# Patient Record
Sex: FEMALE | Race: OTHER | Employment: OTHER | URBAN - METROPOLITAN AREA
[De-identification: names, ages, dates, MRNs, and addresses within clinical notes are randomized per-mention and may not be internally consistent; named-entity substitution may affect disease eponyms.]

---

## 2017-01-27 ENCOUNTER — CLINICAL SUPPORT (OUTPATIENT)
Dept: CARDIOLOGY CLINIC | Age: 57
End: 2017-01-27

## 2017-01-27 DIAGNOSIS — I48.91 ATRIAL FIBRILLATION, UNSPECIFIED TYPE (HCC): Primary | ICD-10-CM

## 2017-01-27 DIAGNOSIS — Z79.01 LONG TERM (CURRENT) USE OF ANTICOAGULANTS: ICD-10-CM

## 2017-01-30 ENCOUNTER — TELEPHONE (OUTPATIENT)
Dept: CARDIOLOGY CLINIC | Age: 57
End: 2017-01-30

## 2017-02-24 ENCOUNTER — CLINICAL SUPPORT (OUTPATIENT)
Dept: CARDIOLOGY CLINIC | Age: 57
End: 2017-02-24

## 2017-02-24 DIAGNOSIS — Z79.01 LONG TERM (CURRENT) USE OF ANTICOAGULANTS: ICD-10-CM

## 2017-02-24 DIAGNOSIS — I48.91 ATRIAL FIBRILLATION, UNSPECIFIED TYPE (HCC): Primary | ICD-10-CM

## 2017-02-24 LAB
INR BLD: NORMAL
INR, EXTERNAL: 2 (ref 2–3)
PT POC: NORMAL SEC
VALID INTERNAL CONTROL?: YES

## 2017-03-24 ENCOUNTER — CLINICAL SUPPORT (OUTPATIENT)
Dept: CARDIOLOGY CLINIC | Age: 57
End: 2017-03-24

## 2017-03-24 DIAGNOSIS — Z79.01 LONG TERM (CURRENT) USE OF ANTICOAGULANTS: ICD-10-CM

## 2017-03-24 DIAGNOSIS — I48.0 PAROXYSMAL ATRIAL FIBRILLATION (HCC): Primary | ICD-10-CM

## 2017-03-24 LAB
INR BLD: NORMAL
PT POC: NORMAL SEC
VALID INTERNAL CONTROL?: YES

## 2017-03-28 ENCOUNTER — TELEPHONE (OUTPATIENT)
Dept: CARDIOLOGY CLINIC | Age: 57
End: 2017-03-28

## 2017-03-28 NOTE — TELEPHONE ENCOUNTER
Please call Ms. Yane Castillo at [phone] or daughter, Vandana Alexandra at [phone]. She saw her allergy dr and was advised that she has enlarged heart. She's due to being flying out of the country on 4/10/17. She needs to know if it's ok for her to fly.      Thank you, Aga Gallegos

## 2017-03-28 NOTE — TELEPHONE ENCOUNTER
Returned call to TXU Areli, unable to reach patient. Patient needs to have an echo due to CXR showed cardiomegaly.  Appointment scheduled:    Future Appointments  Date Time Provider Pablito Shah   3/29/2017 3:00 PM ECHOTWO, [address]   4/24/2017 9:40 AM COUM[facility]   6/26/2017 3:00 PM Robb Campbell  [address]     2 pt identifiers used

## 2017-03-29 ENCOUNTER — CLINICAL SUPPORT (OUTPATIENT)
Dept: CARDIOLOGY CLINIC | Age: 57
End: 2017-03-29

## 2017-03-29 DIAGNOSIS — I48.0 PAROXYSMAL ATRIAL FIBRILLATION (HCC): ICD-10-CM

## 2017-03-29 DIAGNOSIS — I51.7 CARDIOMEGALY: Primary | ICD-10-CM

## 2017-04-04 ENCOUNTER — TELEPHONE (OUTPATIENT)
Dept: CARDIOLOGY CLINIC | Age: 57
End: 2017-04-04

## 2017-04-04 NOTE — TELEPHONE ENCOUNTER
Pt calling to see if Dr. Anika Castro need to see her before she leave town. She can be reached at [phone].  [employer]

## 2017-04-04 NOTE — TELEPHONE ENCOUNTER
Patient identified with 2 identifiers  Daughter called back, she would like to know if her mother can leave town based on the results of her echo done on 3-29-17. Please advise, thank you.

## 2017-04-05 NOTE — TELEPHONE ENCOUNTER
I spoke to daughter Denilson Artis, 2 pt identifiers used  Per Dr. Edelmira Nichols Echo is WNL, EF 55-60% and patient is okay to go out of town.  She will follow up as scheduled:    Future Appointments  Date Time Provider Pablito Shah   4/24/2017 9:40 AM COUMADIN, [address]   6/26/2017 3:00 PM Francisco Paige  [address]

## 2017-04-27 ENCOUNTER — CLINICAL SUPPORT (OUTPATIENT)
Dept: CARDIOLOGY CLINIC | Age: 57
End: 2017-04-27

## 2017-04-27 DIAGNOSIS — I48.0 PAROXYSMAL ATRIAL FIBRILLATION (HCC): ICD-10-CM

## 2017-04-27 DIAGNOSIS — Z79.01 LONG TERM (CURRENT) USE OF ANTICOAGULANTS: Primary | ICD-10-CM

## 2017-04-27 LAB — INR, EXTERNAL: 2.1 (ref 2–3)

## 2017-05-04 LAB
INR BLD: NORMAL
PT POC: NORMAL SEC
VALID INTERNAL CONTROL?: YES

## 2017-05-04 NOTE — PROGRESS NOTES

## 2017-05-26 ENCOUNTER — CLINICAL SUPPORT (OUTPATIENT)
Dept: CARDIOLOGY CLINIC | Age: 57
End: 2017-05-26

## 2017-05-26 DIAGNOSIS — Z79.01 LONG TERM (CURRENT) USE OF ANTICOAGULANTS: ICD-10-CM

## 2017-05-26 DIAGNOSIS — I48.0 PAROXYSMAL ATRIAL FIBRILLATION (HCC): Primary | ICD-10-CM

## 2017-05-26 LAB
INR BLD: NORMAL
INR, EXTERNAL: 1.9 (ref 2–3)
PT POC: NORMAL SECONDS
VALID INTERNAL CONTROL?: YES

## 2017-06-26 ENCOUNTER — OFFICE VISIT (OUTPATIENT)
Dept: CARDIOLOGY CLINIC | Age: 57
End: 2017-06-26

## 2017-06-26 ENCOUNTER — CLINICAL SUPPORT (OUTPATIENT)
Dept: CARDIOLOGY CLINIC | Age: 57
End: 2017-06-26

## 2017-06-26 VITALS
WEIGHT: 181.6 LBS | RESPIRATION RATE: 16 BRPM | SYSTOLIC BLOOD PRESSURE: 124 MMHG | BODY MASS INDEX: 32.18 KG/M2 | HEART RATE: 60 BPM | DIASTOLIC BLOOD PRESSURE: 82 MMHG | HEIGHT: 63 IN

## 2017-06-26 DIAGNOSIS — I48.0 PAROXYSMAL ATRIAL FIBRILLATION (HCC): Primary | ICD-10-CM

## 2017-06-26 DIAGNOSIS — E78.5 DYSLIPIDEMIA: Chronic | ICD-10-CM

## 2017-06-26 DIAGNOSIS — I25.10 CORONARY ARTERY DISEASE INVOLVING NATIVE HEART WITHOUT ANGINA PECTORIS, UNSPECIFIED VESSEL OR LESION TYPE: ICD-10-CM

## 2017-06-26 DIAGNOSIS — Z79.01 LONG TERM (CURRENT) USE OF ANTICOAGULANTS: ICD-10-CM

## 2017-06-26 DIAGNOSIS — Z71.89 ADVANCED CARE PLANNING/COUNSELING DISCUSSION: ICD-10-CM

## 2017-06-26 DIAGNOSIS — I10 ESSENTIAL HYPERTENSION: Chronic | ICD-10-CM

## 2017-06-26 LAB
INR BLD: NORMAL
INR, EXTERNAL: 2.6 (ref 2–3)
PT POC: NORMAL SECONDS
VALID INTERNAL CONTROL?: YES

## 2017-06-26 NOTE — MR AVS SNAPSHOT
Visit Information Kayode Szymanski y Shreya Personal Médico Departamento Teléfono del Dep. Número de visita 6/26/2017  3:00 PM Vijaya Jacome MD [facility] [phone] Your Appointments 8/3/2017  3:00 PM  
COUMADIN CLINIC with HERBERTH JOHNSON [facility] (CARLITA SCHEDULING) Appt Note: 1 month  
 [address] [facility] [address]  
[address]  
  
    
 12/18/2017  2:40 PM  
ESTABLISHED PATIENT with Vijaya Jacome MD  
[facility] ([facility]) Appt Note: 6 month follow up  
 [address]  
[address] [address] Upcoming Health Maintenance Date Due Hepatitis C Screening 1960 DTaP/Tdap/Td series (1 - Tdap) 1/30/1980 PAP AKA CERVICAL CYTOLOGY 1/30/1980 BREAST CANCER SCRN MAMMOGRAM 1/30/2009 FOBT Q 1 YEAR AGE 50-75 1/30/2009 INFLUENZA AGE 9 TO ADULT 8/1/2017 Alergias  Review Complete El: 6/26/2017 Por: Anyi Leo A partir del:  6/26/2017 Intensidad Anotado Tipo de reacción [facility] Financial Bactrim [Sulfamethoprim Ds]  12/15/2015    Rash, Nausea and Vomiting Other Medication  12/15/2015    Rash SURGICAL GLUE Oxycodone  12/15/2015    Nausea and Vomiting Vacunas actuales [id number] el:  12/26/2015 No hay ninguna vacuna archivada. No revisadas esta visita You Were Diagnosed With   
  
 Michelle Chery Paroxysmal atrial fibrillation (HCC)    -  Primary ICD-10-CM: I48.0 ICD-9-CM: 427.31 Dyslipidemia     ICD-10-CM: E78.5 ICD-9-CM: 272.4 Essential hypertension     ICD-10-CM: I10 
ICD-9-CM: 401.9 Advanced care planning/counseling discussion     ICD-10-CM: Z71.89 ICD-9-CM: V65.49 Partes vitales PS Pulso Resp Morgan ( percentil de crecimiento) Peso (percentil de crecimiento) BMI (IMC)  
 124/82 (BP 1 Location: Left arm, BP Patient Position: Sitting) 60 16 5' 3\" (1.6 m) 181 lb 9.6 oz (82.4 kg) 32.17 kg/m2 Estado obstétrico Estatus de tabaquísmo Menopause Former Smoker Historial de signos vitales BMI and BSA Data Body Mass Index Body Surface Area  
 32.17 kg/m 2 1.91 m 2 [facility] Name Phone [facility] [address] [phone] Gómez lista de medicamentos actualizada Lista actualizada el: 6/26/17  4:02 PM.  Ani Pries use gómez lista de medicamentos más reciente. acetaminophen 500 mg tablet También conocido manuelito:  TYLENOL Take 1 Tab by mouth every four (4) hours (while awake). ADVAIR DISKUS 500-50 mcg/dose diskus inhaler Medicamento genérico:  fluticasone-salmeterol Take 1 Inhalation by inhalation daily. albuterol 90 mcg/actuation inhaler También conocido manuelito:  PROVENTIL HFA, VENTOLIN HFA, PROAIR HFA Take 2 Puffs by inhalation every four (4) hours as needed. aspirin delayed-release 81 mg tablet Take  by mouth daily. atenolol 50 mg tablet También conocido manuelito:  TENORMIN  
TAKE ONE TABLET BY MOUTH DAILY  
  
 atorvastatin 80 mg tablet También conocido manuelito:  LIPITOR Take 80 mg by mouth nightly. fluticasone 50 mcg/actuation nasal spray También conocido manuelito:  FLONASE 2 Sprays by Both Nostrils route two (2) times a day. fosinopril 10 mg tablet También conocido manuelito:  MONOPRIL Take 10 mg by mouth daily. glipiZIDE 5 mg tablet También conocido manuelito:  Arabi Pears Take  by mouth daily (with lunch). minocycline 100 mg capsule También conocido manuelito:  Climmie Score Take  by mouth daily as needed. NovoLOG Mix 70-30 FlexPen 100 unit/mL (70-30) Inpn Medicamento genérico:  insulin aspart protamine/insulin aspart  
by SubCUTAneous route three (3) times daily. PT TAKES ON SS, BUT SHE CANNOT TELL WHAT THE SCALE IS-STATES IT'S BASED ON WHAT SHE IS GOING TO EAT. PATANOL 0.1 % ophthalmic solution Medicamento genérico:  olopatadine Administer 2 Drops to both eyes two (2) times a day. PROTONIX 40 mg tablet Medicamento genérico:  pantoprazole Take 40 mg by mouth daily. RESTASIS 0.05 % ophthalmic emulsion Medicamento genérico:  cycloSPORINE Administer 1 Drop to both eyes as needed. senna-docusate 8.6-50 mg per tablet También conocido manuelito:  Agnesshea Friend Take 1 Tab by mouth two (2) times a day. sucralfate 1 gram tablet También conocido manuelito:  Abeba Gurrola Take 1 g by mouth four (4) times daily. SYSTANE GEL 0.4-0.3 % Drpg Medicamento genérico:  peg 400-propylene glycol Apply  to eye two (2) times a day. traMADol 50 mg tablet También conocido manuelito:  ULTRAM  
Take 1 Tab by mouth every six (6) hours as needed for Pain. Max Daily Amount: 200 mg.  
  
 warfarin 2 mg tablet También conocido manuelito:  COUMADIN Take 2 Tabs by mouth daily. Hicimos lo siguiente AMB POC EKG ROUTINE W/ 12 LEADS, INTER & REP [68705 CPT(R)] Introducing [facility]! [facility] introduce portal paciente MyChart . Ahora se puede acceder a partes de gómez expediente médico, enviar por correo electrónico la oficina de gómez médico y solicitar renovaciones de medicamentos en línea. En gómez navegador de Internet , Dawood Huffman a [email or web address] Lucia clic en el usuario por Humberto Mujica? Verlean Dover clic aquí en la sesión [place]. Verá la página de registro [place]. Ingrese gómez código de [employer] ben y manuelito aparece a continuación. Usted no tendrá que [employer] código después de ting completado el proceso de registro .  Si usted no se inscribe antes de la fecha de caducidad , debe solicitar un nuevo código. · MyChart Código de acceso : [id number] Expires: 6/29/2017  1:07 PM 
 
Ingresa los últimos cuatro dígitos de gómez Número de Seguro Social ( xxxx ) y fecha de nacimiento ( dd / mm / aaaa ) manuelito se indica y lucia clic en Enviar. Usted será llevado a la siguiente página de registro . Crear un ID MyChart . Esta será gómez ID de inicio de sesión de MyChart y no puede ser [place] , por lo que pensar en angelika que es [place] y fácil de recordar . Crear angelika contraseña MyChart . Usted puede cambiar gómez contraseña en cualquier momento . Ingrese gómez Password Reset de preguntas y [place] . [place] se puede utilizar en un momento posterior si usted olvida gómez contraseña. Introduzca gómez dirección de correo electrónico . Nely Howard recibirá angelika notificación por correo electrónico cuando la nueva información está disponible en MyChart . Michelletete Rogers clic en Registrarse. Kristan Gram juanito y descargar porciones de gómez expediente médico. 
Lucia clic en el enlace de descarga del menú Resumen para descargar angelika copia portátil de gómez información médica . Si tiene [employer] , por favor visite la sección de preguntas frecuentes del sitio web MyChart . Recuerde, MyChart NO es que se utilizará para las necesidades urgentes. Para emergencias médicas , llame al 911 . Ahora disponible en gómez iPhone y Android ! Por favor proporcione sada resumen de la documentación de cuidado a gómez próximo proveedor. Your primary care clinician is listed as Shira Correa. If you have any questions after today's visit, please call [phone].

## 2017-06-26 NOTE — ACP (ADVANCE CARE PLANNING)
Discussed honoring choices with her today. She does not have an advanced directive or medical power of  and indicates that she would be interested in completing them. She says she would designate her daughter as her healthcare agent although she is technically still  to her  but they have been  for many years. She was given a honoring choices folder and I advised her that our [facility]. facilitator Rakesh Thompson would be in touch with her. She indicated that she may want to wait to complete her facilitation at her next visit to cut down on trips to office but will ask Della Bañuelos to revisit this with her.
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
## 2017-06-27 ENCOUNTER — PATIENT OUTREACH (OUTPATIENT)
Dept: CARDIOLOGY CLINIC | Age: 57
End: 2017-06-27

## 2017-06-27 NOTE — ACP (ADVANCE CARE PLANNING)
Pt was provided Honoring choices information packet by NP - with office visit 6/26/17-  NN will follow up with pt to facilitate document review/ completion. Entry into data base.     Shantel Lemon RN , Keily Jett, [facility]   [facility]  [phone]

## 2017-08-03 ENCOUNTER — CLINICAL SUPPORT (OUTPATIENT)
Dept: CARDIOLOGY CLINIC | Age: 57
End: 2017-08-03

## 2017-08-03 DIAGNOSIS — Z79.01 LONG TERM (CURRENT) USE OF ANTICOAGULANTS: Primary | ICD-10-CM

## 2017-08-03 DIAGNOSIS — I48.0 PAROXYSMAL ATRIAL FIBRILLATION (HCC): ICD-10-CM

## 2017-08-03 LAB
INR BLD: NORMAL
INR, EXTERNAL: 2.6 (ref 2–3)
PT POC: NORMAL SECONDS
VALID INTERNAL CONTROL?: YES

## 2017-09-01 ENCOUNTER — CLINICAL SUPPORT (OUTPATIENT)
Dept: CARDIOLOGY CLINIC | Age: 57
End: 2017-09-01

## 2017-09-01 DIAGNOSIS — I48.0 PAROXYSMAL ATRIAL FIBRILLATION (HCC): ICD-10-CM

## 2017-09-01 DIAGNOSIS — Z79.01 LONG TERM (CURRENT) USE OF ANTICOAGULANTS: Primary | ICD-10-CM

## 2017-09-01 LAB
INR BLD: NORMAL
INR, EXTERNAL: 1.8 (ref 2–3)
PT POC: NORMAL SECONDS
VALID INTERNAL CONTROL?: YES

## 2017-09-01 NOTE — PROGRESS NOTES
A full discussion of the nature of anticoagulants has been carried out. A benefit risk analysis has been presented to the patient, so that they understand the justification for choosing anticoagulation at this time. The need for frequent and regular monitoring, precise dosage adjustment and compliance is stressed. Side effects of potential bleeding are discussed. The patient should avoid any OTC items containing aspirin or ibuprofen, and should avoid great swings in general diet. Avoid alcohol consumption. Call if any signs of abnormal bleeding. Next PT/INR test in 2 weeks. Dose increased to 6 mg M-W-F, 4 mg all other days.

## 2017-09-12 ENCOUNTER — CLINICAL SUPPORT (OUTPATIENT)
Dept: CARDIOLOGY CLINIC | Age: 57
End: 2017-09-12

## 2017-09-12 DIAGNOSIS — I48.0 PAROXYSMAL ATRIAL FIBRILLATION (HCC): Primary | ICD-10-CM

## 2017-09-12 DIAGNOSIS — Z79.01 LONG TERM (CURRENT) USE OF ANTICOAGULANTS: ICD-10-CM

## 2017-09-12 LAB
INR BLD: NORMAL
INR, EXTERNAL: 2.2 (ref 2–3)
PT POC: NORMAL SECONDS
VALID INTERNAL CONTROL?: YES

## 2017-10-20 ENCOUNTER — CLINICAL SUPPORT (OUTPATIENT)
Dept: CARDIOLOGY CLINIC | Age: 57
End: 2017-10-20

## 2017-10-20 DIAGNOSIS — Z79.01 LONG-TERM (CURRENT) USE OF ANTICOAGULANTS: Primary | ICD-10-CM

## 2017-10-20 DIAGNOSIS — I48.0 PAROXYSMAL ATRIAL FIBRILLATION (HCC): ICD-10-CM

## 2017-10-20 LAB
INR BLD: NORMAL
INR, EXTERNAL: 1.8 (ref 2–3)
PT POC: NORMAL SECONDS
VALID INTERNAL CONTROL?: YES

## 2017-11-30 ENCOUNTER — CLINICAL SUPPORT (OUTPATIENT)
Dept: CARDIOLOGY CLINIC | Age: 57
End: 2017-11-30

## 2017-11-30 DIAGNOSIS — I48.0 PAROXYSMAL ATRIAL FIBRILLATION (HCC): ICD-10-CM

## 2017-11-30 DIAGNOSIS — Z79.01 LONG-TERM (CURRENT) USE OF ANTICOAGULANTS: Primary | ICD-10-CM

## 2017-11-30 LAB
INR BLD: NORMAL
INR, EXTERNAL: 2.5 (ref 2–3)
PT POC: NORMAL SECONDS
VALID INTERNAL CONTROL?: YES

## 2017-11-30 RX ORDER — CHOLECALCIFEROL (VITAMIN D3) 125 MCG
2 CAPSULE ORAL
COMMUNITY
End: 2020-10-07

## 2017-12-28 ENCOUNTER — CLINICAL SUPPORT (OUTPATIENT)
Dept: CARDIOLOGY CLINIC | Age: 57
End: 2017-12-28

## 2017-12-28 DIAGNOSIS — I48.0 PAROXYSMAL ATRIAL FIBRILLATION (HCC): ICD-10-CM

## 2017-12-28 DIAGNOSIS — Z79.01 LONG-TERM (CURRENT) USE OF ANTICOAGULANTS: Primary | ICD-10-CM

## 2017-12-28 LAB
INR BLD: NORMAL
INR, EXTERNAL: 2.5 (ref 2–3)
PT POC: NORMAL SECONDS
VALID INTERNAL CONTROL?: YES

## 2017-12-28 NOTE — PROGRESS NOTES
A full discussion of the nature of anticoagulants has been carried out. A benefit risk analysis has been presented to the patient, so that they understand the justification for choosing anticoagulation at this time. The need for frequent and regular monitoring, precise dosage adjustment and compliance is stressed. Side effects of potential bleeding are discussed. The patient should avoid any OTC items containing aspirin or ibuprofen, and should avoid great swings in general diet. Avoid alcohol consumption. Call if any signs of abnormal bleeding. Next PT/INR test in 1 month. Reports slight bleeding from nose. To use vaseline prn. Reports slight bleeding from  vaginal area. Instructed to schedule appointment with OB-Gyn if it continues. No change in dosing regimen.

## 2018-01-26 ENCOUNTER — CLINICAL SUPPORT (OUTPATIENT)
Dept: CARDIOLOGY CLINIC | Age: 58
End: 2018-01-26

## 2018-01-26 DIAGNOSIS — I48.0 PAROXYSMAL ATRIAL FIBRILLATION (HCC): ICD-10-CM

## 2018-01-26 DIAGNOSIS — Z79.01 LONG-TERM (CURRENT) USE OF ANTICOAGULANTS: Primary | ICD-10-CM

## 2018-01-26 LAB
INR BLD: NORMAL
INR, EXTERNAL: 2.3
PT POC: NORMAL SECONDS
VALID INTERNAL CONTROL?: YES

## 2018-02-01 ENCOUNTER — TELEPHONE (OUTPATIENT)
Dept: CARDIOLOGY CLINIC | Age: 58
End: 2018-02-01

## 2018-02-19 ENCOUNTER — TELEPHONE ANTICOAG (OUTPATIENT)
Dept: CARDIOLOGY CLINIC | Age: 58
End: 2018-02-19

## 2018-02-19 DIAGNOSIS — I48.0 PAROXYSMAL ATRIAL FIBRILLATION (HCC): ICD-10-CM

## 2018-02-19 LAB — INR, EXTERNAL: 2.1

## 2018-02-19 NOTE — PROGRESS NOTES
The INR is stable and therapeutic. Continue same dose of coumadin and recheck in 2-4 weeks via home monitoring system. The patient was not contacted by phone due to no changes necessary.

## 2018-03-05 ENCOUNTER — TELEPHONE ANTICOAG (OUTPATIENT)
Dept: CARDIOLOGY CLINIC | Age: 58
End: 2018-03-05

## 2018-03-05 DIAGNOSIS — I48.0 PAROXYSMAL ATRIAL FIBRILLATION (HCC): ICD-10-CM

## 2018-03-05 LAB — INR, EXTERNAL: 2.3

## 2018-03-19 ENCOUNTER — CLINICAL SUPPORT (OUTPATIENT)
Dept: CARDIOLOGY CLINIC | Age: 58
End: 2018-03-19

## 2018-03-19 ENCOUNTER — OFFICE VISIT (OUTPATIENT)
Dept: CARDIOLOGY CLINIC | Age: 58
End: 2018-03-19

## 2018-03-19 VITALS
RESPIRATION RATE: 16 BRPM | SYSTOLIC BLOOD PRESSURE: 130 MMHG | BODY MASS INDEX: 33.1 KG/M2 | WEIGHT: 186.8 LBS | DIASTOLIC BLOOD PRESSURE: 76 MMHG | HEART RATE: 66 BPM | HEIGHT: 63 IN

## 2018-03-19 DIAGNOSIS — I48.0 PAROXYSMAL ATRIAL FIBRILLATION (HCC): Primary | ICD-10-CM

## 2018-03-19 DIAGNOSIS — I25.10 CORONARY ARTERY DISEASE INVOLVING NATIVE CORONARY ARTERY OF NATIVE HEART WITHOUT ANGINA PECTORIS: ICD-10-CM

## 2018-03-19 DIAGNOSIS — I10 ESSENTIAL HYPERTENSION: Chronic | ICD-10-CM

## 2018-03-19 DIAGNOSIS — I48.0 PAROXYSMAL ATRIAL FIBRILLATION (HCC): ICD-10-CM

## 2018-03-19 DIAGNOSIS — Z79.01 LONG-TERM (CURRENT) USE OF ANTICOAGULANTS: Primary | ICD-10-CM

## 2018-03-19 DIAGNOSIS — E78.5 DYSLIPIDEMIA: Chronic | ICD-10-CM

## 2018-03-19 LAB
INR BLD: NORMAL
INR, EXTERNAL: 2.7
PT POC: NORMAL SECONDS
VALID INTERNAL CONTROL?: YES

## 2018-03-19 NOTE — PROGRESS NOTES
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
## 2018-03-19 NOTE — PATIENT INSTRUCTIONS
Stop Coumadin Monday 3/19/18. Recheck home INR testing and call the office to speak with the coumadin nurse with your results.  Start Eliquis once INR is less than 2

## 2018-03-19 NOTE — MR AVS SNAPSHOT
[address] [facility] [address] [place] 
[phone] Patient: Claire Dillon MRN: [id number] Visit Information Jose Alvarez y Shreya Personal Médico Departamento Teléfono del Dep. Número de visita 3/19/2018  4:00 PM Sammy Veras MD [facility] Kiana Nuñez [phone] Your Appointments 7/2/2018  3:00 PM  
ESTABLISHED PATIENT with Sammy Veras MD  
[facility] (Mikel Mccarthy) Appt Note: 3 mo f/u per Dr. Palacio Sat [address] [place]  
[address] [id number] Upcoming Health Maintenance Date Due Hepatitis C Screening 1960 DTaP/Tdap/Td series (1 - Tdap) 1/30/1980 PAP AKA CERVICAL CYTOLOGY 1/30/1980 BREAST CANCER SCRN MAMMOGRAM 1/30/2009 FOBT Q 1 YEAR AGE 50-75 1/30/2009 Influenza Age 5 to Adult 8/1/2017 MEDICARE YEARLY EXAM 3/14/2018 [place]  Review Complete El: 3/19/2018 Por: Delora Horacio A partir del:  3/19/2018 Intensidad Anotado Tipo de reacción [facility] Financial Bactrim [Sulfamethoprim Ds]  12/15/2015    Rash, Nausea and Vomiting Other Medication  12/15/2015    Rash SURGICAL GLUE Oxycodone  12/15/2015    Nausea and Vomiting Vacunas actuales LTLYEAOLW el:  12/26/2015 No hay ninguna vacuna archivada. No revisadas esta visita Partes vitales PS Pulso Resp [place] ( percentil de crecimiento) Peso (percentil de crecimiento) BMI (IMC)  
 130/76 (BP 1 Location: Left arm, BP Patient Position: Sitting) 66 16 5' 3\" (1.6 m) 186 lb 12.8 oz (84.7 kg) 33.09 kg/m2 Estado obstétrico Estatus de tabaquísmo Menopause Former Smoker Historial de signos vitales BMI and BSA Data Body Mass Index Body Surface Area 33.09 kg/m 2 1.94 m 2 [facility] Name Phone [facility] [address], . [address] [phone] Russell lista de medicamentos actualizada Mckay Rahman actualizada 3/19/18  4:54 PM.  Juan A Bernardo use russell lista de medicamentos más reciente. acetaminophen 500 mg tablet También conocido manuelito:  TYLENOL Take 1 Tab by mouth every four (4) hours (while awake). ADVAIR DISKUS 500-50 mcg/dose diskus inhaler Medicamento genérico:  fluticasone-salmeterol Take 1 Inhalation by inhalation daily. albuterol 90 mcg/actuation inhaler También conocido manuelito:  PROVENTIL HFA, VENTOLIN HFA, PROAIR HFA Take 2 Puffs by inhalation every four (4) hours as needed. ALEVE 220 mg Cap Medicamento genérico:  naproxen sodium Take 2 Caps by mouth daily as needed. aspirin delayed-release 81 mg tablet Take  by mouth daily. atenolol 50 mg tablet También conocido manuelito:  TENORMIN  
TAKE ONE TABLET BY MOUTH DAILY  
  
 atorvastatin 80 mg tablet También conocido manuelito:  LIPITOR Take 80 mg by mouth nightly. fluticasone 50 mcg/actuation nasal spray También conocido manuelito:  FLONASE 2 Sprays by Both Nostrils route two (2) times a day. fosinopril 10 mg tablet También conocido manuelito:  MONOPRIL Take 10 mg by mouth daily. glipiZIDE 5 mg tablet También conocido manuelito:  Coco Buffy Take  by mouth daily (with lunch). minocycline 100 mg capsule También conocido manuelito:  Ipava Benes Take  by mouth daily as needed. NovoLOG Mix 70-30FlexPen U-100 100 unit/mL (70-30) Inpn Medicamento genérico:  insulin aspart protamine/insulin aspart  
by SubCUTAneous route three (3) times daily. PT TAKES ON SS, BUT SHE CANNOT TELL WHAT THE SCALE IS-STATES IT'S BASED ON WHAT SHE IS GOING TO EAT. PATANOL 0.1 % ophthalmic solution Medicamento genérico:  olopatadine Administer 2 Drops to both eyes two (2) times a day. PROTONIX 40 mg tablet Medicamento genérico:  pantoprazole Take 40 mg by mouth daily. RESTASIS 0.05 % ophthalmic emulsion Medicamento genérico:  cycloSPORINE Administer 1 Drop to both eyes as needed. senna-docusate 8.6-50 mg per tablet También conocido manuelito:  Cassieallan Bourgeois Take 1 Tab by mouth two (2) times a day. sucralfate 1 gram tablet También conocido manueltio:  [place] Take 1 g by mouth four (4) times daily. SYSTANE GEL 0.4-0.3 % Drpg Medicamento genérico:  peg 400-propylene glycol Apply  to eye two (2) times a day. traMADol 50 mg tablet También conocido manuelito:  ULTRAM  
Take 1 Tab by mouth every six (6) hours as needed for Pain. Max Daily Amount: 200 mg.  
  
 warfarin 2 mg tablet También conocido manuelito:  COUMADIN Take 2 Tabs by mouth daily. Instrucciones para el Paciente Stop Coumadin Monday 3/19/18. Recheck home INR testing and call the office to speak with the coumadin nurse with your results. Introducing [facility]! [facility] introduce portal paciente MyChart . Ahora se puede acceder a partes de gómez expediente médico, enviar por correo electrónico la oficina de gómez médico y solicitar renovaciones de medicamentos en línea. En gómez navegador de Internet , Blaire Serna a [email or web address] Radha clic en el usuario por Paulina Tavera? Saskhi Dryer clic aquí en la sesión [facility]. Verá la página de registro [place]. Ingrese gómez código de [employer] ben y manuelito aparece a continuación. Usted no tendrá que [employer] código después de ting completado el proceso de registro . Si usted no se inscribe antes de la fecha de caducidad , debe solicitar un nuevo código. · MyChart Código de acceso : [id number] Expires: 6/3/2018  3:23 PM 
 
Ingresa los últimos cuatro dígitos de gómez Número de Seguro Social ( xxxx ) y fecha de nacimiento ( dd / mm / aaaa ) manuelito se indica y radha clic en Enviar. Usted será llevado a la siguiente página de registro . Crear un ID MyChart . Esta será gómez ID de inicio de sesión de MyChart y no puede ser [place] , por lo que pensar en angelika que es [place] y fácil de recordar . Crear angelika contraseña MyChart . Usted puede cambiar gómez contraseña en cualquier momento . Ingrese gómez Password Reset de preguntas y [place] . [place] se puede utilizar en un momento posterior si usted olvida gómez contraseña. Introduzca gómez dirección de correo electrónico . Bard Gonzalez recibirá angelika notificación por correo electrónico cuando la nueva información está disponible en MyChart . Simi Michelle clic en Registrarse. Clair Arechigal juanito y descargar porciones de gómez expediente médico. 
Lucia clic en el enlace de descarga del menú Resumen para descargar angelika copia portátil de gómez información médica . Si tiene [employer] , por favor visite la sección de preguntas frecuentes del sitio web MyChart . Recuerde, MyChart NO es que se utilizará para las necesidades urgentes. Para emergencias médicas , llame al 911 . Ahora disponible en gómez iPhone y Android ! Por favor proporcione sada resumen de la documentación de cuidado a gómez próximo proveedor. Your primary care clinician is listed as Reta Be. If you have any questions after today's visit, please call [phone].

## 2018-03-20 NOTE — TELEPHONE ENCOUNTER
Requested Prescriptions     Signed Prescriptions Disp Refills    apixaban (ELIQUIS) 5 mg tablet 180 Tab 3     Sig: Take 1 Tab by mouth two (2) times a day.      Authorizing Provider: Pal Persaud     Ordering User: Edi Almaguer     Per patient request.

## 2018-03-22 ENCOUNTER — TELEPHONE ANTICOAG (OUTPATIENT)
Dept: CARDIOLOGY CLINIC | Age: 58
End: 2018-03-22

## 2018-03-22 DIAGNOSIS — I48.0 PAROXYSMAL ATRIAL FIBRILLATION (HCC): ICD-10-CM

## 2018-03-22 LAB — INR, EXTERNAL: 1.4 (ref 2–3)

## 2018-03-22 NOTE — PROGRESS NOTES
Identifiers x 2. Per Dr. Marbella Garcia, to start eliquis q 12 hours (11am/11pm). Patient may move dosing regimen up an hour each day until times are convenient. Instructed patient of the above. Discussed rationale for taking medication q 12 hours to allow continuous coverage of blood thinner. Verbalized understanding. Will contact home INR monitoring company regarding patient no longer needing device.

## 2018-03-29 PROBLEM — I25.10 CORONARY ARTERY DISEASE INVOLVING NATIVE CORONARY ARTERY OF NATIVE HEART WITHOUT ANGINA PECTORIS: Status: ACTIVE | Noted: 2018-03-29

## 2018-05-31 ENCOUNTER — TELEPHONE (OUTPATIENT)
Dept: CARDIOLOGY CLINIC | Age: 58
End: 2018-05-31

## 2018-05-31 NOTE — TELEPHONE ENCOUNTER
Left VMM that I returned call. Records received and reviewed by Dr. Shaaron Schaumann. Patient can see NP Sandra Castellon next week.     Future Appointments  Date Time Provider Pablito Shah   6/5/2018 11:00 AM Leticia Bhat, [address] [place] [address]   7/2/2018 3:00 PM Tristen Echavarria  [address] [place] [address]

## 2018-05-31 NOTE — TELEPHONE ENCOUNTER
Pt called stating she went to the ED @ [address] [place] [address] for chest pain and they told her she needed to make an appointment with Dr. Eber Newell. She can be reached @ [phone].      Thanks

## 2018-06-05 ENCOUNTER — OFFICE VISIT (OUTPATIENT)
Dept: CARDIOLOGY CLINIC | Age: 58
End: 2018-06-05

## 2018-06-05 VITALS
WEIGHT: 179.8 LBS | HEART RATE: 66 BPM | DIASTOLIC BLOOD PRESSURE: 60 MMHG | BODY MASS INDEX: 31.85 KG/M2 | SYSTOLIC BLOOD PRESSURE: 110 MMHG

## 2018-06-05 DIAGNOSIS — E78.5 DYSLIPIDEMIA: Chronic | ICD-10-CM

## 2018-06-05 DIAGNOSIS — I25.10 CORONARY ARTERY DISEASE INVOLVING NATIVE CORONARY ARTERY OF NATIVE HEART WITHOUT ANGINA PECTORIS: ICD-10-CM

## 2018-06-05 DIAGNOSIS — R06.09 DYSPNEA ON EXERTION: ICD-10-CM

## 2018-06-05 DIAGNOSIS — I48.0 PAROXYSMAL ATRIAL FIBRILLATION (HCC): ICD-10-CM

## 2018-06-05 DIAGNOSIS — I10 ESSENTIAL HYPERTENSION: Chronic | ICD-10-CM

## 2018-06-05 DIAGNOSIS — R07.9 CHEST PAIN, UNSPECIFIED TYPE: Primary | ICD-10-CM

## 2018-06-05 RX ORDER — AZELASTINE HCL 205.5 UG/1
SPRAY NASAL 2 TIMES DAILY
COMMUNITY
End: 2020-10-07

## 2018-06-05 RX ORDER — AMOXICILLIN AND CLAVULANATE POTASSIUM 875; 125 MG/1; MG/1
1 TABLET, FILM COATED ORAL 2 TIMES DAILY
COMMUNITY
Start: 2018-06-04 | End: 2018-09-07 | Stop reason: ALTCHOICE

## 2018-06-05 RX ORDER — METOPROLOL SUCCINATE 50 MG/1
50 TABLET, EXTENDED RELEASE ORAL DAILY
Qty: 30 TAB | Refills: 5 | Status: SHIPPED | OUTPATIENT
Start: 2018-06-05 | End: 2018-09-07 | Stop reason: SDUPTHER

## 2018-06-05 RX ORDER — ATENOLOL 25 MG/1
25 TABLET ORAL DAILY
COMMUNITY
End: 2018-06-05

## 2018-06-05 RX ORDER — KETOTIFEN FUMARATE 0.35 MG/ML
1 SOLUTION/ DROPS OPHTHALMIC DAILY
COMMUNITY
End: 2020-10-07

## 2018-06-05 RX ORDER — FOSINOPIRL SODIUM 10 MG/1
5 TABLET ORAL DAILY
Qty: 15 TAB | Refills: 5 | Status: SHIPPED | OUTPATIENT
Start: 2018-06-05 | End: 2018-09-07 | Stop reason: SDUPTHER

## 2018-06-05 NOTE — PATIENT INSTRUCTIONS
Please stop taking Atenolol and begin Toprol XL 50mg daily  Please call the office if your insurance won't cover it or your co-pay is too high  Please reduce your Fosinopril to 5mg (1/2 of a 10mg tablet) daily   Please get a blood pressure monitor for your home so that you can check your blood pressure

## 2018-06-05 NOTE — MR AVS SNAPSHOT
[address] [facility] [address] [place] 
[phone] Patient: Angela Gunn MRN: [id number] WPZ:[id number] Visit Information Diana Ibrahim andreea Cash Personal Médico Departamento Teléfono del Dep. Número de visita 6/5/2018 11:00 AM Kate Sahu NP [facility] Brock Noriega [phone] Your Appointments 6/12/2018  1:00 PM  
NUCLEAR MEDICINE with HERBERTH OLIVAREZ [facility] (CARLITA SCHEDULING) Appt Note: 1 day Lexiscan for CP, CAD ht 5'3 wt 179 per [facility]; 1 day Lexiscan for CP, CAD ht 5'3 wt 179 per Lisa Mckeon pt rsd 6/11 to 6/12 to coincide w/her mothers appts kmr [address] [place]  [address] [place]  
[address] [place] [address]   
  
    
 9/7/2018  1:00 PM  
ESTABLISHED PATIENT with Kate Sahu NP  
[facility] ([address] [place] [address]) Appt Note: 3 mo f/u per [address] [place] [address]  
[address] [address] Upcoming Health Maintenance Date Due Hepatitis C Screening 1960 DTaP/Tdap/Td series (1 - Tdap) 1/30/1980 PAP AKA CERVICAL CYTOLOGY 1/30/1980 BREAST CANCER SCRN MAMMOGRAM 1/30/2009 FOBT Q 1 YEAR AGE 50-75 1/30/2009 MEDICARE YEARLY EXAM 3/14/2018 Influenza Age 5 to Adult 8/1/2018 [place]  Review Complete El: 6/5/2018 Por: Richa August A partir del:  6/5/2018 Intensidad Anotado Tipo de reacción [employer] Bactrim [Sulfamethoprim Ds]  12/15/2015    Rash, Nausea and Vomiting Other Medication  12/15/2015    Rash SURGICAL GLUE Oxycodone  12/15/2015    Nausea and Vomiting Vacunas actuales [id number] el:  12/26/2015 No hay ninguna vacuna archivada. No revisadas esta visita You Were Diagnosed With   
  
 Noreene Mode Chest pain, unspecified type    -  Primary ICD-10-CM: R07.9 ICD-9-CM: 786.50 Dyslipidemia     ICD-10-CM: E78.5 ICD-9-CM: 272.4 Essential hypertension     ICD-10-CM: I10 
ICD-9-CM: 401.9 Coronary artery disease involving native coronary artery of native heart without angina pectoris     ICD-10-CM: I25.10 ICD-9-CM: 414.01 Paroxysmal atrial fibrillation (HCC)     ICD-10-CM: I48.0 ICD-9-CM: 427.31 Dyspnea on exertion     ICD-10-CM: R06.09 
ICD-9-CM: 786.09 Partes vitales PS Pulso Peso (percentil de crecimiento) [place] Estado obstétrico Estatus de tabaquísmo 110/60 (BP 1 Location: Left arm, BP Patient Position: Sitting) 66 179 lb 12.8 oz (81.6 kg) 31.85 kg/m2 Menopause Former Smoker Historial de signos vitales BMI and BSA Data Body Mass Index Body Surface Area  
 31.85 kg/m 2 1.9 m 2 [facility] Name Phone [facility]/PHARMACY #[id number] Oswaldo Easton Russell lista de medicamentos actualizada Radha Garcia actualizada 6/5/18 11:48 AM.  [place] o[place]ton use russell lista de medicamentos más reciente. acetaminophen 500 mg tablet También conocido manuelito:  TYLENOL Take 1 Tab by mouth every four (4) hours (while awake). ADVAIR DISKUS 500-50 mcg/dose diskus inhaler Medicamento genérico:  fluticasone-salmeterol Take 1 Inhalation by inhalation daily. albuterol 90 mcg/actuation inhaler También conocido manuelito:  PROVENTIL HFA, VENTOLIN HFA, PROAIR HFA Take 2 Puffs by inhalation every four (4) hours as needed. ALEVE 220 mg Cap Medicamento genérico:  naproxen sodium Take 2 Caps by mouth daily as needed. amoxicillin-clavulanate 875-125 mg per tablet También conocido manuelito:  AUGMENTIN Take 1 Tab by mouth two (2) times a day. apixaban 5 mg tablet También conocido manuelito:  Lewis Mohan Take 1 Tab by mouth two (2) times a day. aspirin delayed-release 81 mg tablet Take  by mouth daily. atorvastatin 80 mg tablet También conocido manuelito:  LIPITOR Take 80 mg by mouth nightly. Azelastine 0.15 % (205.5 mcg) nasal spray También conocido manuelito:  ASTEPRO  
two (2) times a day. FARXIGA 10 mg Tab tablet Medicamento genérico:  dapagliflozin Take  by mouth daily. fluticasone 50 mcg/actuation nasal spray También conocido manuelito:  FLONASE 2 Sprays by Both Nostrils route two (2) times a day. fosinopril 10 mg tablet También conocido manuelito:  MONOPRIL Take 0.5 Tabs by mouth daily. ketotifen 0.025 % (0.035 %) ophthalmic solution También conocido manuelito:  ZADITOR Administer 1 Drop to both eyes daily. metoprolol succinate 50 mg XL tablet También conocido manuelito:  VKZKZT-PK Take 1 Tab by mouth daily. minocycline 100 mg capsule También conocido manuelito:  Veatrice Copping Take  by mouth daily as needed. NovoLOG Mix 70-30FlexPen U-100 100 unit/mL (70-30) Inpn Medicamento genérico:  insulin aspart protamine/insulin aspart  
by SubCUTAneous route three (3) times daily. PT TAKES ON SS, BUT SHE CANNOT TELL WHAT THE SCALE IS-STATES IT'S BASED ON WHAT SHE IS GOING TO EAT. PATANOL 0.1 % ophthalmic solution Medicamento genérico:  olopatadine Administer 2 Drops to both eyes two (2) times a day. PROTONIX 40 mg tablet Medicamento genérico:  pantoprazole Take 40 mg by mouth daily. RESTASIS 0.05 % ophthalmic emulsion Medicamento genérico:  cycloSPORINE Administer 1 Drop to both eyes as needed. sucralfate 1 gram tablet También conocido manuelito:  Martita Trona Take 1 g by mouth four (4) times daily. SYSTANE GEL 0.4-0.3 % Drpg Medicamento genérico:  peg 400-propylene glycol Apply  to eye two (2) times a day. Recetas Enviado a la Lupillo Refills  
 fosinopril (MONOPRIL) 10 mg tablet 5 Sig: Take 0.5 Tabs by mouth daily.   
 Class: Normal  
 Pharmacy: [facility]/pharmacy [address] Ph #: [phone] Route: Oral  
 metoprolol succinate (TOPROL-XL) 50 mg XL tablet 5 Sig: Take 1 Tab by mouth daily. Class: Normal  
 Pharmacy: [facility]/pharmacy [place] [address], [address] [place] [address] Ph #: [phone] Route: Oral  
  
Hicimos lo siguiente AMB POC EKG ROUTINE W/ 12 LEADS, INTER & REP [41656 CPT(R)] Instrucciones para el Paciente Please stop taking Atenolol and begin Toprol XL 50mg daily Please call the office if your insurance won't cover it or your co-pay is too high Please reduce your Fosinopril to 5mg (1/2 of a 10mg tablet) daily Please get a blood pressure monitor for your home so that you can check your blood pressure Introducing [facility] SERVICES! [facility] introduce portal paciente MyChart . Ahora se puede acceder a partes de gómez expediente médico, enviar por correo electrónico la oficina de gómez médico y solicitar renovaciones de medicamentos en línea. En gómez navegador de Internet , Suzie Mouse a [email or web address] Radha clic en el usuario por Donneta Presser? Corene Bijou clic aquí en la sesión Roger Nail. Verá la página de registro [place]. Ingrese gómez código de [facility] Arelis ben y manuelito aparece a continuación. Usted no tendrá que [employer] código después de ting completado el proceso de registro . Si usted no se inscribe antes de la fecha de caducidad , debe solicitar un nuevo código. · MyChart Código de acceso : [id number] Expires: 9/3/2018 11:28 AM 
 
Ingresa los últimos cuatro dígitos de gómez Número de Seguro Social ( xxxx ) y fecha de nacimiento ( dd / mm / aaaa ) manuelito se indica y radha clic en Enviar. Usted será llevado a la siguiente página de registro . Crear un ID MyChart . Esta será gómez ID de inicio de sesión de MyChart y no puede ser [place] , por lo que pensar en angelika que es Drusilla Gutiérrez y fácil de recordar . Crear angelika contraseña MyChart . Usted puede cambiar gómez contraseña en cualquier momento . Ingrese gómez Password Reset de preguntas y [place] . [place] se puede utilizar en un momento posterior si usted olvida gómez contraseña. Introduzca gómez dirección de correo electrónico . Obed Ernico recibirá angelika notificación por correo electrónico cuando la nueva información está disponible en MyChart . [place] clic en Registrarse. Vernona Curling juanito y descargar porciones de gómez expediente médico. 
Lucia clic en el enlace de descarga del menú Resumen para descargar angelika copia portátil de gómez información médica . Si tiene [employer] , por favor visite la sección de preguntas frecuentes del sitio web MyChart . Recuerde, MyChart NO es que se utilizará para las necesidades urgentes. Para emergencias médicas , llame al 911 . Ahora disponible en gómez iPhone y Android ! Por favor proporcione sada resumen de la documentación de cuidado a gómez próximo proveedor. Your primary care clinician is listed as Kylah Colon. If you have any questions after today's visit, please call [phone].

## 2018-06-12 ENCOUNTER — CLINICAL SUPPORT (OUTPATIENT)
Dept: CARDIOLOGY CLINIC | Age: 58
End: 2018-06-12

## 2018-06-12 DIAGNOSIS — I48.0 PAROXYSMAL ATRIAL FIBRILLATION (HCC): ICD-10-CM

## 2018-06-12 DIAGNOSIS — I10 ESSENTIAL HYPERTENSION: Chronic | ICD-10-CM

## 2018-06-12 DIAGNOSIS — R94.31 ABNORMAL EKG: ICD-10-CM

## 2018-06-12 DIAGNOSIS — R07.9 CHEST PAIN, UNSPECIFIED TYPE: ICD-10-CM

## 2018-06-12 DIAGNOSIS — E78.5 DYSLIPIDEMIA: Primary | Chronic | ICD-10-CM

## 2018-06-12 DIAGNOSIS — I25.10 CORONARY ARTERY DISEASE INVOLVING NATIVE CORONARY ARTERY OF NATIVE HEART WITHOUT ANGINA PECTORIS: ICD-10-CM

## 2018-06-12 NOTE — PROGRESS NOTES
See scanned document. Ordering Doctor:Dr. Todd Banerjee  Reading Doctor:Dr. Todd Banerjee    Patient tolerated procedure well.

## 2018-06-15 ENCOUNTER — TELEPHONE (OUTPATIENT)
Dept: CARDIOLOGY CLINIC | Age: 58
End: 2018-06-15

## 2018-06-15 NOTE — TELEPHONE ENCOUNTER
Identifiers x 2. Informed patient of recent lexiscan results. Confirmed follow up with Gonzalo Whitley NP on 9-7-18.

## 2018-09-07 ENCOUNTER — OFFICE VISIT (OUTPATIENT)
Dept: CARDIOLOGY CLINIC | Age: 58
End: 2018-09-07

## 2018-09-07 VITALS
HEIGHT: 63 IN | WEIGHT: 169 LBS | SYSTOLIC BLOOD PRESSURE: 138 MMHG | HEART RATE: 66 BPM | BODY MASS INDEX: 29.95 KG/M2 | DIASTOLIC BLOOD PRESSURE: 72 MMHG

## 2018-09-07 DIAGNOSIS — I10 ESSENTIAL HYPERTENSION: Chronic | ICD-10-CM

## 2018-09-07 DIAGNOSIS — E78.5 DYSLIPIDEMIA: Chronic | ICD-10-CM

## 2018-09-07 DIAGNOSIS — I48.0 PAROXYSMAL ATRIAL FIBRILLATION (HCC): Primary | ICD-10-CM

## 2018-09-07 RX ORDER — FOSINOPIRL SODIUM 10 MG/1
5 TABLET ORAL DAILY
Qty: 45 TAB | Refills: 3 | Status: SHIPPED | OUTPATIENT
Start: 2018-09-07 | End: 2019-03-07 | Stop reason: SDUPTHER

## 2018-09-07 RX ORDER — METOPROLOL SUCCINATE 50 MG/1
50 TABLET, EXTENDED RELEASE ORAL DAILY
Qty: 90 TAB | Refills: 3 | Status: SHIPPED | OUTPATIENT
Start: 2018-09-07 | End: 2019-03-07 | Stop reason: SDUPTHER

## 2018-09-07 RX ORDER — AMOXICILLIN 500 MG/1
2000 CAPSULE ORAL ONCE
Qty: 4 CAP | Refills: 3 | Status: SHIPPED | OUTPATIENT
Start: 2018-09-07 | End: 2018-09-07

## 2018-09-07 RX ORDER — FLUTICASONE FUROATE AND VILANTEROL 100; 25 UG/1; UG/1
1 POWDER RESPIRATORY (INHALATION) DAILY
COMMUNITY
End: 2021-11-03

## 2018-09-07 NOTE — PROGRESS NOTES
[facility]  [phone]    HPI: Bridger Mejia is a 60y.o. year-old who presents for follow up regarding her Atrial Fib and CAD. She is feeling great, has lost 10 lb since her last visit  She is walking more, sometimes 2 miles/day  Has mild dizziness occasionally but no falls or syncope, drrinks > 6 glasses of water daily  Denies any chest pain  No dyspnea with exertion, no PND  Has only had palpitations x 1 since her last visit, only lasted a couple of minutes without associated symptoms  Has varicose veins but not much LE edema  No unusual bleeding on eliquis   Got cath report from 2000 - NO CAD     **Labs received after her office visit dated 9/11/18  BMP ok, hepatic panel ok  , , , HDL 57  A1c 11.2%    **After her last office visit pulmonary note received dated 7/26/18  She has asthma, PFT results in note, started on Breo Ellipta     Assessment/Plan:  1. Atrial fibrillation- initially occurred cesar-op knee surgery, then had recurrence s/p DCCV, doing well on Toprol XL 50mg daily, CHADS2CVASC=3 (HTN, DM, sex) so will continue Eliquis 5mg BID, she will follow up in 6 months in the office  2. Chest pain - no ischemia per nuclear stress test 6/18, no current chest pain, no CAD by cardiac cath in 2000   3. S/p right knee replacement on 12/22/15 - followed by Dr. Alaina Bhatia  4. DM Type 2 - on insulin, A1c 8.2%, management per PCP/Dr. Zhang   5. HTN - controlled, continue Toprol XL and fosinopril     6. Dyslipidemia - LDL 87, continue atorvastatin 80mg daily   7. Vision trouble - had laser surgery, followed by Dr. Melina Mazariegos, not discussed today  8.  Dizziness - improved      Nuc Stress test 6/18 - no ischemia  Echo 3/17 - LVEF 55-60%, no WMA   Loop Monitor 5/16 - no arrhythmias  Brennon Nuc Stress 1/16 - no ischemia, LVEF 60%  Echo 12/15 - LVEF 55-60%, no WMA  Cardiac Cath 11/2000 - no CAD, no plaque, apical HK, LVEF 67%    Fam Hx: brother with MI  Soc Hx: no tobacco use    She has a past medical history of Arthritis; Asthma; Atrial fibrillation ([facility] [address] Diabetes ([facility] [address] GERD (gastroesophageal reflux disease); Glaucoma; High cholesterol; Hypertension; Long term current use of anticoagulant therapy; Nausea & vomiting; and Psychiatric disorder. Cardiovascular ROS: positive for palpitations  Respiratory ROS: no cough or wheezing  Neurological ROS: no TIA or stroke symptoms  All other systems negative except as above. PE  Vitals:    09/07/18 1300   BP: 138/72   Pulse: 66   Weight: 169 lb (76.7 kg)   Height: 5' 3\" (1.6 m)    Body mass index is 29.94 kg/(m^2).    General appearance - alert, well appearing, and in no distress  Mental status - affect appropriate to mood  Eyes - sclera anicteric, moist mucous membranes  Neck - supple  Lymphatics - not assessed  Chest - clear to auscultation, no wheezes, rales or rhonchi  Heart - normal rate, regular rhythm, normal S1, S2, 1/6 BRAD   Abdomen - soft, nontender, nondistended  Back exam - full range of motion, no tenderness  Neurological - cranial nerves II through XII grossly intact, no focal deficit  Musculoskeletal - no muscular tenderness noted, normal strength  Extremities - peripheral pulses normal, no pedal edema  Skin - normal coloration  no rashes    Recent Labs:  No results found for: CHOL, CHOLX, CHLST, CHOLV, 850035, HDL, LDL, LDLC, DLDLP, TGLX, TRIGL, TRIGP, CHHD, [facility]  Lab Results   Component Value Date/Time    Creatinine 0.56 12/24/2015 05:12 AM     Lab Results   Component Value Date/Time    BUN 8 12/24/2015 05:12 AM     Lab Results   Component Value Date/Time    Potassium 4.4 12/24/2015 05:12 AM     Lab Results   Component Value Date/Time    Hemoglobin A1c 8.1 (H) 12/15/2015 03:36 PM     Lab Results   Component Value Date/Time    HGB 10.8 (L) 12/23/2015 05:03 AM     Lab Results   Component Value Date/Time    PLATELET 441 30/14/7919 03:36 PM       Reviewed:  Past Medical History:   Diagnosis Date    Arthritis     Asthma     [facility]rial fibrillation ([facility]     Diabetes ([facility]     GERD (gastroesophageal reflux disease)     Glaucoma     High cholesterol     Hypertension     Long term current use of anticoagulant therapy     Nausea & vomiting     Psychiatric disorder     DEPRESSION     History   Smoking Status    Former Smoker    Packs/day: 0.25    Years: 10.00    Quit date: 12/15/2003   Smokeless Tobacco    Never Used     History   Alcohol Use No     Allergies   Allergen Reactions    Bactrim [Sulfamethoprim Ds] Rash and Nausea and Vomiting    Other Medication Rash     SURGICAL GLUE    Oxycodone Nausea and Vomiting       Current Outpatient Prescriptions   Medication Sig    fluticasone-vilanterol (BREO ELLIPTA) 100-25 mcg/dose inhaler Take 1 Puff by inhalation daily.  dapagliflozin (FARXIGA) 10 mg tab tablet Take  by mouth daily.  Azelastine (ASTEPRO) 0.15 % (205.5 mcg) nasal spray two (2) times a day.  ketotifen (ZADITOR) 0.025 % (0.035 %) ophthalmic solution Administer 1 Drop to both eyes daily.  fosinopril (MONOPRIL) 10 mg tablet Take 0.5 Tabs by mouth daily.  metoprolol succinate (TOPROL-XL) 50 mg XL tablet Take 1 Tab by mouth daily.  apixaban (ELIQUIS) 5 mg tablet Take 1 Tab by mouth two (2) times a day.  naproxen sodium (ALEVE) 220 mg cap Take 2 Caps by mouth daily as needed.  acetaminophen (TYLENOL) 500 mg tablet Take 1 Tab by mouth every four (4) hours (while awake). (Patient taking differently: Take 500 mg by mouth every six (6) hours as needed.)    insulin aspart protamine/insulin aspart (NOVOLOG MIX 70-30 FLEXPEN) 100 unit/mL (70-30) flex pen by SubCUTAneous route three (3) times daily. PT TAKES ON SS, BUT SHE CANNOT TELL WHAT THE SCALE IS-STATES IT'S BASED ON WHAT SHE IS GOING TO EAT.  aspirin delayed-release 81 mg tablet Take  by mouth daily.  atorvastatin (LIPITOR) 80 mg tablet Take 80 mg by mouth nightly.  minocycline (MINOCIN, DYNACIN) 100 mg capsule Take  by mouth daily as needed.     pantoprazole (PROTONIX) 40 mg tablet Take 40 mg by mouth daily.  sucralfate (CARAFATE) 1 gram tablet Take 1 g by mouth four (4) times daily.  albuterol (PROVENTIL HFA, VENTOLIN HFA, PROAIR HFA) 90 mcg/actuation inhaler Take 2 Puffs by inhalation every four (4) hours as needed.  olopatadine (PATANOL) 0.1 % ophthalmic solution Administer 2 Drops to both eyes two (2) times a day.  peg 400-propylene glycol (SYSTANE GEL) 0.4-0.3 % drpg Apply  to eye two (2) times a day.  cycloSPORINE (RESTASIS) 0.05 % ophthalmic emulsion Administer 1 Drop to both eyes as needed.  fluticasone (FLONASE) 50 mcg/actuation nasal spray 2 Sprays by Both Nostrils route two (2) times a day. No current facility-administered medications for this visit.         Mery Purvis NP  [facility] [address] [place] [address] [address], [address] [place] [address]  Ozzy Smith  [phone]

## 2018-09-07 NOTE — MR AVS SNAPSHOT
[address] [facility] [address] [facility] 
[phone] Patient: Armaan Garcia MRN: [id number] AZO:[id number] Visit Information Rod Escalante Personal Médico Departamento Teléfono del Dep. Número de visita 9/7/2018  1:00 PM Yuly Kapoor NP [facility] Beau Osman [phone] Upcoming Health Maintenance Date Due Hepatitis C Screening 1960 DTaP/Tdap/Td series (1 - Tdap) 1/30/1980 PAP AKA CERVICAL CYTOLOGY 1/30/1980 BREAST CANCER SCRN MAMMOGRAM 1/30/2009 FOBT Q 1 YEAR AGE 50-75 1/30/2009 MEDICARE YEARLY EXAM 3/14/2018 Influenza Age 5 to Adult 8/1/2018 [place]  Review Complete El: 9/7/2018 Por: Caron Mosher LPN A partir del:  9/7/2018 Intensidad Anotado Tipo de reacción [facility] Bactrim [Sulfamethoprim Ds]  12/15/2015    Rash, Nausea and Vomiting Other Medication  12/15/2015    Rash SURGICAL GLUE Oxycodone  12/15/2015    Nausea and Vomiting Vacunas actuales GLMUKYCSA el:  12/26/2015 No hay ninguna vacuna archivada. No revisadas esta visita You Were Diagnosed With   
  
 Cole Marquez Coronary artery disease involving native coronary artery of native heart without angina pectoris    -  Primary ICD-10-CM: I25.10 ICD-9-CM: 414.01 Paroxysmal atrial fibrillation (HCC)     ICD-10-CM: I48.0 ICD-9-CM: 427.31 Essential hypertension     ICD-10-CM: I10 
ICD-9-CM: 401.9 Dyslipidemia     ICD-10-CM: E78.5 ICD-9-CM: 272.4 Partes vitales PS Pulso [place] ( percentil de crecimiento) Peso (percentil de crecimiento) BMI ([facility]) Estado obstétrico  
 138/72 (BP 1 Location: Left arm, BP Patient Position: Sitting) 66 5' 3\" (1.6 m) 169 lb (76.7 kg) 29.94 kg/m2 Menopause Estatus de tabaquísmo Former Smoker Historial de signos vitales BMI and BSA Data Body Mass Index Body Surface Area  
 29.94 kg/m 2 1.85 m 2 Basilia Velasquez Pharmacy Name Phone [facility]/PHARMACY #[id number] TYSON Russell lista de medicamentos actualizada Lista actualizada 9/7/18  1:21 PM.  Siempre use russell lista de medicamentos más reciente. acetaminophen 500 mg tablet También conocido manuelito:  TYLENOL Take 1 Tab by mouth every four (4) hours (while awake). albuterol 90 mcg/actuation inhaler También conocido manuelito:  PROVENTIL HFA, VENTOLIN HFA, PROAIR HFA Take 2 Puffs by inhalation every four (4) hours as needed. ALEVE 220 mg Cap Medicamento genérico:  naproxen sodium Take 2 Caps by mouth daily as needed. amoxicillin 500 mg capsule También conocido manuelito:  AMOXIL Take 4 Caps by mouth once for 1 dose. apixaban 5 mg tablet También conocido manuelito:  Katelyn Loll Take 1 Tab by mouth two (2) times a day. aspirin delayed-release 81 mg tablet Take  by mouth daily. atorvastatin 80 mg tablet También conocido manuelito:  LIPITOR Take 80 mg by mouth nightly. Azelastine 0.15 % (205.5 mcg) nasal spray También conocido manuelito:  ASTEPRO  
two (2) times a day. BREO ELLIPTA 100-25 mcg/dose inhaler Medicamento genérico:  fluticasone-vilanterol Take 1 Puff by inhalation daily. FARXIGA 10 mg Tab tablet Medicamento genérico:  dapagliflozin Take  by mouth daily. fluticasone 50 mcg/actuation nasal spray También conocido manuelito:  FLONASE 2 Sprays by Both Nostrils route two (2) times a day. fosinopril 10 mg tablet También conocido manuelito:  MONOPRIL Take 0.5 Tabs by mouth daily. ketotifen 0.025 % (0.035 %) ophthalmic solution También conocido manuelito:  ZADITOR Administer 1 Drop to both eyes daily. metoprolol succinate 50 mg XL tablet También conocido manueilto:  ZKCPMT-BL Take 1 Tab by mouth daily. minocycline 100 mg capsule También conocido manuelito:  Taylor Bernal Take  by mouth daily as needed. NovoLOG Mix 70-30FlexPen U-100 100 unit/mL (70-30) Inpn Medicamento genérico:  insulin aspart protamine/insulin aspart  
by SubCUTAneous route three (3) times daily. PT TAKES ON SS, BUT SHE CANNOT TELL WHAT THE SCALE IS-STATES IT'S BASED ON WHAT SHE IS GOING TO EAT. PATANOL 0.1 % ophthalmic solution Medicamento genérico:  olopatadine Administer 2 Drops to both eyes two (2) times a day. PROTONIX 40 mg tablet Medicamento genérico:  pantoprazole Take 40 mg by mouth daily. RESTASIS 0.05 % ophthalmic emulsion Medicamento genérico:  cycloSPORINE Administer 1 Drop to both eyes as needed. sucralfate 1 gram tablet También conocido manuelito:  Jacqualyn Nestle Take 1 g by mouth four (4) times daily. SYSTANE GEL 0.4-0.3 % Drpg Medicamento genérico:  peg 400-propylene glycol Apply  to eye two (2) times a day. Recetas Enviado a ivania Wesley Refills  
 amoxicillin (AMOXIL) 500 mg capsule 3 Sig: Take 4 Caps by mouth once for 1 dose. Class: Normal  
 Pharmacy: [facility]/pharmacy [address] Ph #: [phone] Route: Oral  
 metoprolol succinate (TOPROL-XL) 50 mg XL tablet 3 Sig: Take 1 Tab by mouth daily. Class: Normal  
 [facility] [address] Ph #: [phone] Route: Oral  
 apixaban (ELIQUIS) 5 mg tablet 3 Sig: Take 1 Tab by mouth two (2) times a day. Class: Normal  
 [facility] [address] Ph #: [phone] Route: Oral  
 fosinopril (MONOPRIL) 10 mg tablet 3 Sig: Take 0.5 Tabs by mouth daily. Class: Normal  
 Pharmacy: [facility]/pharmacy [address] Ph #: [phone] Route: Oral  
  
Instrucciones para el Paciente Please keep up the good work with exercise and weight loss Introducing [facility] & HEALTH SERVICES! [facility] introduce portal paciente MyChart . Ahora se puede acceder a partes de gómez expediente médico, enviar por correo electrónico la oficina de gómez médico y solicitar renovaciones de medicamentos en línea. En gómez navegador de Internet , Luz Tee a [email or web address] Radha clic en el usuario por Arelia Arms? [place] Shells clic aquí en la sesión [facility]. Verá la página de registro [place]. Ingrese gómez código de [employer] Arelis ben y manuelito aparece a continuación. Usted no tendrá que [employer] código después de ting completado el proceso de registro . Si usted no se inscribe antes de la fecha de caducidad , debe solicitar un nuevo código. · MyChart Código de acceso : [id number] Expires: 12/6/2018  1:21 PM 
 
Ingresa los últimos cuatro dígitos de gómez Número de Seguro Social ( xxxx ) y fecha de nacimiento ( dd / mm / aaaa ) manuelito se indica y radha clic en Enviar. Usted será llevado a la siguiente página de registro . Crear un ID MyChart . Esta será gómez ID de inicio de sesión de MyChart y no puede ser [place] , por lo que pensar en angelika que es Casie Primus y fácil de recordar . Crear angelika contraseña MyChart . Usted puede cambiar gómez contraseña en cualquier momento . Ingrese gómez Password Reset de preguntas y [place] . [place] se puede utilizar en un momento posterior si usted olvida gómez contraseña. Introduzca gómez dirección de correo electrónico . Selene Suarez recibirá angelika notificación por correo electrónico cuando la nueva información está disponible en MyChart . Christina Mount clic en Registrarse. [place] juanito y descargar porciones de gómez expediente médico. 
Radha clchen en el enlace de descarga del menú Resumen para descargar angelika copia portátil de gómez información médica . Si tiene [employer] , por favor visite la sección de preguntas frecuentes del sitio web MyChart . Recuerde, MyChart NO es que se utilizará para las necesidades urgentes. Para emergencias médicas , llame al 911 . Ahora disponible en gómez iPhone y Android ! Por favor proporcione sada resumen de la documentación de cuidado a gómez próximo proveedor. Your primary care clinician is listed as Demian John. If you have any questions after today's visit, please call [phone].

## 2019-03-07 ENCOUNTER — OFFICE VISIT (OUTPATIENT)
Dept: CARDIOLOGY CLINIC | Age: 59
End: 2019-03-07

## 2019-03-07 VITALS
HEIGHT: 63 IN | RESPIRATION RATE: 16 BRPM | HEART RATE: 66 BPM | WEIGHT: 165 LBS | BODY MASS INDEX: 29.23 KG/M2 | DIASTOLIC BLOOD PRESSURE: 66 MMHG | SYSTOLIC BLOOD PRESSURE: 120 MMHG

## 2019-03-07 DIAGNOSIS — I48.91 ATRIAL FIBRILLATION, UNSPECIFIED TYPE (HCC): ICD-10-CM

## 2019-03-07 DIAGNOSIS — I48.0 PAROXYSMAL ATRIAL FIBRILLATION (HCC): Primary | ICD-10-CM

## 2019-03-07 DIAGNOSIS — R07.9 CHEST PAIN, UNSPECIFIED TYPE: ICD-10-CM

## 2019-03-07 DIAGNOSIS — E78.5 DYSLIPIDEMIA: ICD-10-CM

## 2019-03-07 DIAGNOSIS — I10 ESSENTIAL HYPERTENSION: ICD-10-CM

## 2019-03-07 DIAGNOSIS — R94.31 ABNORMAL EKG: ICD-10-CM

## 2019-03-07 RX ORDER — FOSINOPIRL SODIUM 10 MG/1
5 TABLET ORAL DAILY
Qty: 45 TAB | Refills: 3 | Status: SHIPPED | OUTPATIENT
Start: 2019-03-07 | End: 2019-04-04 | Stop reason: ALTCHOICE

## 2019-03-07 RX ORDER — METOPROLOL SUCCINATE 50 MG/1
50 TABLET, EXTENDED RELEASE ORAL DAILY
Qty: 90 TAB | Refills: 3 | Status: SHIPPED | OUTPATIENT
Start: 2019-03-07 | End: 2020-03-23

## 2019-03-07 RX ORDER — ATORVASTATIN CALCIUM 40 MG/1
40 TABLET, FILM COATED ORAL
Qty: 90 TAB | Refills: 3 | Status: SHIPPED | OUTPATIENT
Start: 2019-03-07 | End: 2020-03-03

## 2019-03-07 NOTE — PROGRESS NOTES
[facility]  [phone]    HPI: Chuyita Uriarte is a 60y.o. year-old who presents for follow up regarding her Atrial Fib and CAD. She continues to feel well and lose weight, down another 10 pounds. She hurt her knee and then her ankle and now in a boot to help it heal.   No flutters bp is great. Feels good on the Meotprolol, seems to be working for her. Eliquis is doing fine. Occasional dizziness. She is feeling great, has lost 10 lb since her last visit  She is walking more, sometimes 2 miles/day  Has mild dizziness occasionally but no falls or syncope, drrinks > 6 glasses of water daily  Denies any chest pain  No dyspnea with exertion, no PND  Has only had palpitations x 1 since her last visit, only lasted a couple of minutes without associated symptoms  Has varicose veins but not much LE edema  No unusual bleeding on eliquis   Got cath report from 2000 - NO CAD     **Labs received after her office visit dated 9/11/18  BMP ok, hepatic panel ok  , , , HDL 57  A1c 11.2%    **After her last office visit pulmonary note received dated 7/26/18  She has asthma, PFT results in note, started on Breo Ellipta, no exacerbations in one year now. Assessment/Plan:  1. Atrial fibrillation- initially occurred cesar-op knee surgery, then had recurrence s/p DCCV, doing well on Toprol XL 50mg daily, CHADS2CVASC=3 (HTN, DM, sex) so will continue Eliquis 5mg BID, she will follow up in 6 months in the office  2. Chest pain - no ischemia per nuclear stress test 6/18, no current chest pain, no CAD by cardiac cath in 2000   3. S/p right knee replacement on 12/22/15 - followed by Dr. Edmond Short  4. DM Type 2 - on insulin, A1c 7.8%, management per PCP/Dr. Zhang   5. HTN - controlled, continue Toprol XL and fosinopril     6. Dyslipidemia - LDL 87, continue atorvastatin 80mg daily   7. Vision trouble - had laser surgery, followed by Dr. Betty Rao, not discussed today  8.  Dizziness - improved      Nuc Stress test 6/18 - no ischemia  Echo 3/17 - LVEF 55-60%, no WMA   Loop Monitor 5/16 - no arrhythmias  Brennon Nuc Stress 1/16 - no ischemia, LVEF 60%  Echo 12/15 - LVEF 55-60%, no WMA  Cardiac Cath 11/2000 - no CAD, no plaque, apical HK, LVEF 67%    Fam Hx: brother with MI  Soc Hx: no tobacco use    She  has a past medical history of Arthritis, Asthma, Atrial fibrillation ([facility] [address] Diabetes ([facility] [address] GERD (gastroesophageal reflux disease), Glaucoma, High cholesterol, Hypertension, Long term current use of anticoagulant therapy, Nausea & vomiting, and Psychiatric disorder. Cardiovascular ROS: positive for palpitations  Respiratory ROS: no cough or wheezing  Neurological ROS: no TIA or stroke symptoms  All other systems negative except as above. PE  Vitals:    03/07/19 1159   BP: 120/66   Pulse: 66   Resp: 16   Weight: 165 lb (74.8 kg)   Height: 5' 3\" (1.6 m)    Body mass index is 29.23 kg/m².    General appearance - alert, well appearing, and in no distress  Mental status - affect appropriate to mood  Eyes - sclera anicteric, moist mucous membranes  Neck - supple  Lymphatics - not assessed  Chest - clear to auscultation, no wheezes, rales or rhonchi  Heart - normal rate, regular rhythm, normal S1, S2, 1/6 BRAD   Abdomen - soft, nontender, nondistended  Back exam - full range of motion, no tenderness  Neurological - cranial nerves II through XII grossly intact, no focal deficit  Musculoskeletal - no muscular tenderness noted, normal strength  Extremities - peripheral pulses normal, no pedal edema  Skin - normal coloration  no rashes    Recent Labs:  No results found for: CHOL, CHOLX, CHLST, CHOLV, 395519, HDL, LDL, LDLC, DLDLP, TGLX, TRIGL, TRIGP, CHHD, [facility]  Lab Results   Component Value Date/Time    Creatinine 0.56 12/24/2015 05:12 AM     Lab Results   Component Value Date/Time    BUN 8 12/24/2015 05:12 AM     Lab Results   Component Value Date/Time    Potassium 4.4 12/24/2015 05:12 AM     Lab Results Component Value Date/Time    Hemoglobin A1c 8.1 (H) 12/15/2015 03:36 PM     Lab Results   Component Value Date/Time    HGB 10.8 (L) 12/23/2015 05:03 AM     Lab Results   Component Value Date/Time    PLATELET 629 03/63/3880 03:36 PM       Reviewed:  Past Medical History:   Diagnosis Date    Arthritis     Asthma     Atrial fibrillation (HCC)     Diabetes ([address]     GERD (gastroesophageal reflux disease)     Glaucoma     High cholesterol     Hypertension     Long term current use of anticoagulant therapy     Nausea & vomiting     Psychiatric disorder     DEPRESSION     Social History     Tobacco Use   Smoking Status Former Smoker    Packs/day: 0.25    Years: 10.00    Pack years: 2.50    Last attempt to quit: 12/15/2003    Years since quitting: 15.2   Smokeless Tobacco Never Used     Social History     Substance and Sexual Activity   Alcohol Use No     Allergies   Allergen Reactions    Bactrim [Sulfamethoprim Ds] Rash and Nausea and Vomiting    Other Medication Rash     SURGICAL GLUE    Oxycodone Nausea and Vomiting       Current Outpatient Medications   Medication Sig    fluticasone-vilanterol (BREO ELLIPTA) 100-25 mcg/dose inhaler Take 1 Puff by inhalation daily.  metoprolol succinate (TOPROL-XL) 50 mg XL tablet Take 1 Tab by mouth daily.  apixaban (ELIQUIS) 5 mg tablet Take 1 Tab by mouth two (2) times a day.  fosinopril (MONOPRIL) 10 mg tablet Take 0.5 Tabs by mouth daily.  dapagliflozin (FARXIGA) 10 mg tab tablet Take  by mouth daily.  Azelastine (ASTEPRO) 0.15 % (205.5 mcg) nasal spray two (2) times a day.  ketotifen (ZADITOR) 0.025 % (0.035 %) ophthalmic solution Administer 1 Drop to both eyes daily.  naproxen sodium (ALEVE) 220 mg cap Take 2 Caps by mouth daily as needed.  acetaminophen (TYLENOL) 500 mg tablet Take 1 Tab by mouth every four (4) hours (while awake).  (Patient taking differently: Take 500 mg by mouth every six (6) hours as needed.)    insulin aspart protamine/insulin aspart (NOVOLOG MIX 70-30 FLEXPEN) 100 unit/mL (70-30) flex pen by SubCUTAneous route three (3) times daily. PT TAKES ON SS, BUT SHE CANNOT TELL WHAT THE SCALE IS-STATES IT'S BASED ON WHAT SHE IS GOING TO EAT.  aspirin delayed-release 81 mg tablet Take  by mouth daily.  atorvastatin (LIPITOR) 80 mg tablet Take 80 mg by mouth nightly.  minocycline (MINOCIN, DYNACIN) 100 mg capsule Take  by mouth daily as needed.  pantoprazole (PROTONIX) 40 mg tablet Take 40 mg by mouth daily.  sucralfate (CARAFATE) 1 gram tablet Take 1 g by mouth four (4) times daily.  albuterol (PROVENTIL HFA, VENTOLIN HFA, PROAIR HFA) 90 mcg/actuation inhaler Take 2 Puffs by inhalation every four (4) hours as needed.  olopatadine (PATANOL) 0.1 % ophthalmic solution Administer 2 Drops to both eyes two (2) times a day.  peg 400-propylene glycol (SYSTANE GEL) 0.4-0.3 % drpg Apply  to eye two (2) times a day.  cycloSPORINE (RESTASIS) 0.05 % ophthalmic emulsion Administer 1 Drop to both eyes as needed.  fluticasone (FLONASE) 50 mcg/actuation nasal spray 2 Sprays by Both Nostrils route two (2) times a day. No current facility-administered medications for this visit.         Gerri Herbert MD  [facility] [address] [facility] [address], [address] [facility] [address]  Ceci Smith  [phone]

## 2019-04-04 RX ORDER — ENALAPRIL MALEATE 10 MG/1
10 TABLET ORAL DAILY
Qty: 90 TAB | Refills: 1 | Status: SHIPPED | OUTPATIENT
Start: 2019-04-04 | End: 2019-09-26 | Stop reason: SDUPTHER

## 2019-04-04 RX ORDER — ENALAPRIL MALEATE 10 MG/1
TABLET ORAL DAILY
COMMUNITY
End: 2019-04-04 | Stop reason: SDUPTHER

## 2019-04-04 NOTE — TELEPHONE ENCOUNTER
Requested Prescriptions     Signed Prescriptions Disp Refills    enalapril (VASOTEC) 10 mg tablet 90 Tab 1     Sig: Take 1 Tab by mouth daily. Authorizing Provider: Charlotte Grey     Ordering User: Bre Singh     Per Dr. Georgina Black verbal orders    Patient unable to cut Fosinopril, pill size to small. Brand Teva Discontinued.

## 2019-09-09 ENCOUNTER — OFFICE VISIT (OUTPATIENT)
Dept: CARDIOLOGY CLINIC | Age: 59
End: 2019-09-09

## 2019-09-09 VITALS
BODY MASS INDEX: 29.95 KG/M2 | DIASTOLIC BLOOD PRESSURE: 80 MMHG | WEIGHT: 169 LBS | SYSTOLIC BLOOD PRESSURE: 126 MMHG | OXYGEN SATURATION: 99 % | HEIGHT: 63 IN | HEART RATE: 75 BPM

## 2019-09-09 DIAGNOSIS — R94.31 ABNORMAL EKG: ICD-10-CM

## 2019-09-09 DIAGNOSIS — I10 ESSENTIAL HYPERTENSION: ICD-10-CM

## 2019-09-09 DIAGNOSIS — R06.09 DYSPNEA ON EXERTION: ICD-10-CM

## 2019-09-09 DIAGNOSIS — R07.9 CHEST PAIN, UNSPECIFIED TYPE: Primary | ICD-10-CM

## 2019-09-09 DIAGNOSIS — I25.10 CORONARY ARTERY DISEASE INVOLVING NATIVE CORONARY ARTERY OF NATIVE HEART WITHOUT ANGINA PECTORIS: ICD-10-CM

## 2019-09-09 DIAGNOSIS — E78.5 DYSLIPIDEMIA: ICD-10-CM

## 2019-09-09 DIAGNOSIS — I48.0 PAROXYSMAL ATRIAL FIBRILLATION (HCC): ICD-10-CM

## 2019-09-09 NOTE — PROGRESS NOTES
[facility]  [phone]    HPI: Manjeet Ni is a 60y.o. year-old who presents for follow up regarding her Atrial Fib and CAD. She is feeling well for the most part some chest pain on the left sdie like and ache that comes and goes. She had some palpitations at night a few months ago but it is gone now, went away with drinking water. She continues to feel well and weight is stable now at 169. Garrel Randal She hurt her knee and then her ankle and now in a boot to help it heal.   No flutters bp is great. Feels good on the Metoprolol, seems to be working for her. Eliquis is doing fine. No bleeding. Occasional dizziness. She is walking more, sometimes 2 miles/day  Has mild dizziness occasionally but no falls or syncope, drrinks > 6 glasses of water daily  Denies any chest pain  No dyspnea with exertion, no PND  Has varicose veins but not much LE edema  No unusual bleeding on eliquis   Got cath report from 2000 - NO CAD     **Labs received after her office visit dated 9/11/18  BMP ok, hepatic panel ok  , , , HDL 57  A1c 11.2%    **After her last office visit pulmonary note received dated 7/26/18  She has asthma, PFT results in note, started on Breo Ellipta, no exacerbations in one year now. Assessment/Plan:  1. Atrial fibrillation- initially occurred cesar-op knee surgery, then had recurrence s/p DCCV, doing well on Toprol XL 50mg daily, CHADS2CVASC=3 (HTN, DM, sex) so will continue Eliquis 5mg BID, she will follow up in 6 months in the office  2. Chest pain - no ischemia per nuclear stress test 6/18, no current chest pain, no CAD by cardiac cath in 2000   3. S/p right knee replacement on 12/22/15 - followed by Dr. Elva Foster  4. DM Type 2 - on insulin, A1c 7.8%, management per PCP/Dr. Zhang   5. HTN - controlled, continue Toprol XL and fosinopril     6. Dyslipidemia - LDL 87, continue atorvastatin 80mg daily   7.  Vision trouble - had laser surgery, followed by  Annabelle/opthal, not discussed today  8. Dizziness - improved, minimal now    Nuc Stress test 6/18 - no ischemia  Echo 3/17 - LVEF 55-60%, no WMA   Loop Monitor 5/16 - no arrhythmias  Brennon Nuc Stress 1/16 - no ischemia, LVEF 60%  Echo 12/15 - LVEF 55-60%, no WMA  Cardiac Cath 11/2000 - no CAD, no plaque, apical HK, LVEF 67%    Fam Hx: brother with MI  Soc Hx: no tobacco use    She  has a past medical history of Arthritis, Asthma, Atrial fibrillation ([address] Diabetes ([address] GERD (gastroesophageal reflux disease), Glaucoma, High cholesterol, Hypertension, Long term current use of anticoagulant therapy, Nausea & vomiting, and Psychiatric disorder. Cardiovascular ROS: positive for palpitations  Respiratory ROS: no cough or wheezing  Neurological ROS: no TIA or stroke symptoms  All other systems negative except as above. PE  Vitals:    09/09/19 1403   BP: 126/80   Pulse: 75   SpO2: 99%   Weight: 169 lb (76.7 kg)   Height: 5' 3\" (1.6 m)    Body mass index is 29.94 kg/m².    General appearance - alert, well appearing, and in no distress  Mental status - affect appropriate to mood  Eyes - sclera anicteric, moist mucous membranes  Neck - supple  Lymphatics - not assessed  Chest - clear to auscultation, no wheezes, rales or rhonchi  Heart - normal rate, regular rhythm, normal S1, S2, 1/6 BRAD   Abdomen - soft, nontender, nondistended  Back exam - full range of motion, no tenderness  Neurological - cranial nerves II through XII grossly intact, no focal deficit  Musculoskeletal - no muscular tenderness noted, normal strength  Extremities - peripheral pulses normal, no pedal edema  Skin - normal coloration  no rashes    Recent Labs:  No results found for: CHOL, CHOLX, CHLST, CHOLV, 496127, HDL, LDL, LDLC, DLDLP, TGLX, TRIGL, TRIGP, CHHD, [facility]  Lab Results   Component Value Date/Time    Creatinine 0.56 12/24/2015 05:12 AM     Lab Results   Component Value Date/Time    BUN 8 12/24/2015 05:12 AM     Lab Results   Component Value Date/Time    Potassium 4.4 12/24/2015 05:12 AM     Lab Results   Component Value Date/Time    Hemoglobin A1c 8.1 (H) 12/15/2015 03:36 PM     Lab Results   Component Value Date/Time    HGB 10.8 (L) 12/23/2015 05:03 AM     Lab Results   Component Value Date/Time    PLATELET 472 52/33/0869 03:36 PM       Reviewed:  Past Medical History:   Diagnosis Date    Arthritis     Asthma     Atrial fibrillation (HCC)     Diabetes ([address]     GERD (gastroesophageal reflux disease)     Glaucoma     High cholesterol     Hypertension     Long term current use of anticoagulant therapy     Nausea & vomiting     Psychiatric disorder     DEPRESSION     Social History     Tobacco Use   Smoking Status Former Smoker    Packs/day: 0.25    Years: 10.00    Pack years: 2.50    Last attempt to quit: 12/15/2003    Years since quitting: 15.7   Smokeless Tobacco Never Used     Social History     Substance and Sexual Activity   Alcohol Use No     Allergies   Allergen Reactions    Bactrim [Sulfamethoprim Ds] Rash and Nausea and Vomiting    Other Medication Rash     SURGICAL GLUE    Oxycodone Nausea and Vomiting       Current Outpatient Medications   Medication Sig    enalapril (VASOTEC) 10 mg tablet Take 1 Tab by mouth daily. (Patient taking differently: Take 5 mg by mouth daily.)    atorvastatin (LIPITOR) 40 mg tablet Take 1 Tab by mouth nightly.  metoprolol succinate (TOPROL-XL) 50 mg XL tablet Take 1 Tab by mouth daily.  apixaban (ELIQUIS) 5 mg tablet Take 1 Tab by mouth two (2) times a day.  fluticasone-vilanterol (BREO ELLIPTA) 100-25 mcg/dose inhaler Take 1 Puff by inhalation daily.  dapagliflozin (FARXIGA) 10 mg tab tablet Take  by mouth daily.  ketotifen (ZADITOR) 0.025 % (0.035 %) ophthalmic solution Administer 1 Drop to both eyes daily.  naproxen sodium (ALEVE) 220 mg cap Take 2 Caps by mouth daily as needed.     acetaminophen (TYLENOL) 500 mg tablet Take 1 Tab by mouth every four (4) hours (while awake). (Patient taking differently: Take 500 mg by mouth every six (6) hours as needed.)    insulin aspart protamine/insulin aspart (NOVOLOG MIX 70-30 FLEXPEN) 100 unit/mL (70-30) flex pen by SubCUTAneous route three (3) times daily. PT TAKES ON SS, BUT SHE CANNOT TELL WHAT THE SCALE IS-STATES IT'S BASED ON WHAT SHE IS GOING TO EAT.  minocycline (MINOCIN, DYNACIN) 100 mg capsule Take  by mouth daily as needed.  pantoprazole (PROTONIX) 40 mg tablet Take 40 mg by mouth daily.  sucralfate (CARAFATE) 1 gram tablet Take 1 g by mouth four (4) times daily.  olopatadine (PATANOL) 0.1 % ophthalmic solution Administer 2 Drops to both eyes two (2) times a day.  peg 400-propylene glycol (SYSTANE GEL) 0.4-0.3 % drpg Apply  to eye two (2) times a day.  cycloSPORINE (RESTASIS) 0.05 % ophthalmic emulsion Administer 1 Drop to both eyes as needed.  fluticasone (FLONASE) 50 mcg/actuation nasal spray 2 Sprays by Both Nostrils route two (2) times a day.  Azelastine (ASTEPRO) 0.15 % (205.5 mcg) nasal spray two (2) times a day.  albuterol (PROVENTIL HFA, VENTOLIN HFA, PROAIR HFA) 90 mcg/actuation inhaler Take 2 Puffs by inhalation every four (4) hours as needed. No current facility-administered medications for this visit.         Vick Aleman MD  [facility] [address] [place] [address] [address], [address] [place] [address]  Ozzy Smith  [phone]

## 2019-09-26 RX ORDER — ENALAPRIL MALEATE 10 MG/1
5 TABLET ORAL DAILY
Qty: 90 TAB | Refills: 3 | Status: SHIPPED | OUTPATIENT
Start: 2019-09-26 | End: 2020-12-21

## 2020-03-03 RX ORDER — ATORVASTATIN CALCIUM 40 MG/1
TABLET, FILM COATED ORAL
Qty: 90 TAB | Refills: 3 | Status: SHIPPED | OUTPATIENT
Start: 2020-03-03 | End: 2022-05-13 | Stop reason: SDUPTHER

## 2020-03-19 NOTE — PROGRESS NOTES
[facility]  [phone]    HPI: Armaan Garcia is a 63y.o. year-old who presents for follow up regarding her Atrial Fib and CAD. She is feeling well, concerned about COVID-19 with her asthma  She was sick last week with congestion related to her seasonal allergies, she was coughing a lot, no fevers or chills, she started using her albuterol every 4 hours and got better  She has her baseline EL currently, no PND  BP is well controlled  No palpitations or chest pain  Has rare episodes of dizziness but no syncope or falls  Her weight is up to 182 lbs but she reports decreased exercise recently  Encouraged her to exercise as much as possible  She says she can't do jessica anymore after complications from a foot surgery in 6/19  She reports having some black stools recently, no recent labs   Has varicose veins but not much LE edema    **Labs received on 3/20/20 after patient's office visit   1/17/20 - A1C 7.7%, CMP ok  9/24/19 - , , , HDL 59  5/21/19 - Hgb 14.0, Hct 43.1, Plt 177     Assessment/Plan:  1. Atrial fibrillation- initially occurred cesar-op knee surgery, then had recurrence s/p DCCV, doing well on Toprol XL 50mg daily  -CHADS2CVASC=3 (HTN, DM, sex) so will continue Eliquis 5mg BID   for now  -she is having black stools, has not noticed any blood in her stool, will check CBC and request last CBC results from PCP office  -she will follow up in 6 months in the office  2. Chest pain - no ischemia per nuclear stress test 6/18, no current chest pain, no CAD by cardiac cath in 2000   3. S/p right knee replacement on 12/22/15 - followed by Dr. Olvin Alfred  4. DM Type 2 - on insulin, management per PCP  5. HTN - controlled, continue Toprol XL and enalapril     6. Dyslipidemia - continue atorvastatin 80mg daily, will request lipid results from PCP office   7. Vision trouble - had laser surgery, followed by Dr. Chayo Rosario, not discussed today  8.  Dizziness - improved, minimal now  9. Asthma - on inhalers  10. Mitral regurgitation - will repeat TTE at 6 month follow up visit to reassess, 2/6 BRAD today, a little louder    Nuc Stress test 6/18 - no ischemia  Echo 3/17 - LVEF 55-60%, no WMA, trivial MR  Loop Monitor 5/16 - no arrhythmias  Brennon Nuc Stress 1/16 - no ischemia, LVEF 60%  Echo 12/15 - LVEF 55-60%, no WMA  Cardiac Cath 11/2000 - no CAD, no plaque, apical HK, LVEF 67%    Fam Hx: brother with MI  Soc Hx: no tobacco use    She  has a past medical history of Arthritis, Asthma, Atrial fibrillation ([address] Diabetes ([address] GERD (gastroesophageal reflux disease), Glaucoma, High cholesterol, Hypertension, Long term current use of anticoagulant therapy, Nausea & vomiting, and Psychiatric disorder. Cardiovascular ROS: no chest pain or palpitations  Respiratory ROS: no recent cough or wheezing  Neurological ROS: no TIA or stroke symptoms  All other systems negative except as above. PE  Vitals:    03/20/20 0953   BP: 115/68   Pulse: 84   SpO2: 97%   Weight: 182 lb 12.8 oz (82.9 kg)    Body mass index is 32.38 kg/m².    General appearance - alert, well appearing, and in no distress  Mental status - affect appropriate to mood  Eyes - sclera anicteric, moist mucous membranes  Neck - supple  Lymphatics - not assessed  Chest - clear to auscultation, no wheezes, rales or rhonchi  Heart - normal rate, regular rhythm, normal S1, S2, 2/6 BRAD   Abdomen - soft, nontender, nondistended  Back exam - full range of motion, no tenderness  Neurological - cranial nerves II through XII grossly intact, no focal deficit  Musculoskeletal - no muscular tenderness noted, normal strength  Extremities - peripheral pulses normal, no pedal edema  Skin - normal coloration  no rashes    Recent Labs:  No results found for: CHOL, CHOLX, CHLST, CHOLV, 108977, HDL, HDLP, LDL, LDLC, DLDLP, TGLX, TRIGL, TRIGP, CHHD, CHHDX  Lab Results   Component Value Date/Time    Creatinine 0.56 12/24/2015 05:12 AM     Lab Results Component Value Date/Time    BUN 8 2015 05:12 AM     Lab Results   Component Value Date/Time    Potassium 4.4 2015 05:12 AM     Lab Results   Component Value Date/Time    Hemoglobin A1c 8.1 (H) 12/15/2015 03:36 PM     Lab Results   Component Value Date/Time    HGB 10.8 (L) 2015 05:03 AM     Lab Results   Component Value Date/Time    PLATELET 732  03:36 PM       Reviewed:  Past Medical History:   Diagnosis Date    Arthritis     Asthma     Atrial fibrillation (HCC)     Diabetes ([facility] [address]     GERD (gastroesophageal reflux disease)     Glaucoma     High cholesterol     Hypertension     Long term current use of anticoagulant therapy     Nausea & vomiting     Psychiatric disorder     DEPRESSION     Social History     Tobacco Use   Smoking Status Former Smoker    Packs/day: 0.25    Years: 10.00    Pack years: 2.50    Last attempt to quit: 12/15/2003    Years since quittin.2   Smokeless Tobacco Never Used     Social History     Substance and Sexual Activity   Alcohol Use No     Allergies   Allergen Reactions    Bactrim [Sulfamethoprim Ds] Rash and Nausea and Vomiting    Other Medication Rash     SURGICAL GLUE    Oxycodone Nausea and Vomiting       Current Outpatient Medications   Medication Sig    montelukast (SINGULAIR) 10 mg tablet Take 10 mg by mouth daily.  atorvastatin (LIPITOR) 40 mg tablet TAKE 1 TABLET BY MOUTH EVERY DAY AT NIGHT    enalapril (VASOTEC) 10 mg tablet Take 0.5 Tabs by mouth daily.  metoprolol succinate (TOPROL-XL) 50 mg XL tablet Take 1 Tab by mouth daily.  apixaban (ELIQUIS) 5 mg tablet Take 1 Tab by mouth two (2) times a day.  fluticasone-vilanterol (BREO ELLIPTA) 100-25 mcg/dose inhaler Take 1 Puff by inhalation daily.  dapagliflozin (FARXIGA) 10 mg tab tablet Take  by mouth daily.  Azelastine (ASTEPRO) 0.15 % (205.5 mcg) nasal spray two (2) times a day.     ketotifen (ZADITOR) 0.025 % (0.035 %) ophthalmic solution Administer 1 Drop to both eyes daily.  naproxen sodium (ALEVE) 220 mg cap Take 2 Caps by mouth daily as needed.  acetaminophen (TYLENOL) 500 mg tablet Take 1 Tab by mouth every four (4) hours (while awake). (Patient taking differently: Take 500 mg by mouth every six (6) hours as needed.)    insulin aspart protamine/insulin aspart (NOVOLOG MIX 70-30 FLEXPEN) 100 unit/mL (70-30) flex pen by SubCUTAneous route three (3) times daily. PT TAKES ON SS, BUT SHE CANNOT TELL WHAT THE SCALE IS-STATES IT'S BASED ON WHAT SHE IS GOING TO EAT.  minocycline (MINOCIN, DYNACIN) 100 mg capsule Take  by mouth daily as needed.  pantoprazole (PROTONIX) 40 mg tablet Take 40 mg by mouth daily.  sucralfate (CARAFATE) 1 gram tablet Take 1 g by mouth four (4) times daily.  albuterol (PROVENTIL HFA, VENTOLIN HFA, PROAIR HFA) 90 mcg/actuation inhaler Take 2 Puffs by inhalation every four (4) hours as needed.  olopatadine (PATANOL) 0.1 % ophthalmic solution Administer 2 Drops to both eyes two (2) times a day.  peg 400-propylene glycol (SYSTANE GEL) 0.4-0.3 % drpg Apply  to eye two (2) times a day.  cycloSPORINE (RESTASIS) 0.05 % ophthalmic emulsion Administer 1 Drop to both eyes as needed.  fluticasone (FLONASE) 50 mcg/actuation nasal spray 2 Sprays by Both Nostrils route two (2) times a day. No current facility-administered medications for this visit.         Andrei Rivera NP  [facility] [address] [facility] [address], [address] [facility] [address]  [facility], [address]  [phone]

## 2020-03-20 ENCOUNTER — HOSPITAL ENCOUNTER (OUTPATIENT)
Dept: LAB | Age: 60
Discharge: HOME | End: 2020-03-20
Payer: MEDICARE

## 2020-03-20 ENCOUNTER — OFFICE VISIT (OUTPATIENT)
Dept: CARDIOLOGY CLINIC | Age: 60
End: 2020-03-20

## 2020-03-20 VITALS
WEIGHT: 182.8 LBS | BODY MASS INDEX: 32.38 KG/M2 | OXYGEN SATURATION: 97 % | SYSTOLIC BLOOD PRESSURE: 115 MMHG | HEART RATE: 84 BPM | DIASTOLIC BLOOD PRESSURE: 68 MMHG

## 2020-03-20 DIAGNOSIS — I34.0 NONRHEUMATIC MITRAL VALVE REGURGITATION: ICD-10-CM

## 2020-03-20 DIAGNOSIS — E78.5 DYSLIPIDEMIA: ICD-10-CM

## 2020-03-20 DIAGNOSIS — Z79.01 CURRENT USE OF LONG TERM ANTICOAGULATION: ICD-10-CM

## 2020-03-20 DIAGNOSIS — I48.0 PAROXYSMAL ATRIAL FIBRILLATION (HCC): Primary | ICD-10-CM

## 2020-03-20 DIAGNOSIS — I10 ESSENTIAL HYPERTENSION: ICD-10-CM

## 2020-03-20 DIAGNOSIS — K92.1 BLACK STOOLS: ICD-10-CM

## 2020-03-20 PROCEDURE — 36415 COLL VENOUS BLD VENIPUNCTURE: CPT

## 2020-03-20 PROCEDURE — 85027 COMPLETE CBC AUTOMATED: CPT

## 2020-03-20 RX ORDER — MONTELUKAST SODIUM 10 MG/1
10 TABLET ORAL DAILY
COMMUNITY

## 2020-03-21 LAB
ERYTHROCYTE [DISTWIDTH] IN BLOOD BY AUTOMATED COUNT: 12.4 % (ref 11.7–15.4)
HCT VFR BLD AUTO: 41.7 % (ref 34–46.6)
HGB BLD-MCNC: 13.7 G/DL (ref 11.1–15.9)
MCH RBC QN AUTO: 27.7 PG (ref 26.6–33)
MCHC RBC AUTO-ENTMCNC: 32.9 G/DL (ref 31.5–35.7)
MCV RBC AUTO: 84 FL (ref 79–97)
PLATELET # BLD AUTO: 254 X10E3/UL (ref 150–450)
RBC # BLD AUTO: 4.95 X10E6/UL (ref 3.77–5.28)
WBC # BLD AUTO: 6.9 X10E3/UL (ref 3.4–10.8)

## 2020-03-23 RX ORDER — METOPROLOL SUCCINATE 50 MG/1
TABLET, EXTENDED RELEASE ORAL
Qty: 90 TAB | Refills: 3 | Status: SHIPPED | OUTPATIENT
Start: 2020-03-23 | End: 2021-03-05

## 2020-04-27 RX ORDER — APIXABAN 5 MG/1
TABLET, FILM COATED ORAL
Qty: 180 TAB | Refills: 3 | Status: SHIPPED | OUTPATIENT
Start: 2020-04-27 | End: 2021-04-09

## 2020-10-07 ENCOUNTER — ANCILLARY PROCEDURE (OUTPATIENT)
Dept: CARDIOLOGY CLINIC | Age: 60
End: 2020-10-07
Payer: MEDICARE

## 2020-10-07 ENCOUNTER — OFFICE VISIT (OUTPATIENT)
Dept: CARDIOLOGY CLINIC | Age: 60
End: 2020-10-07
Payer: MEDICARE

## 2020-10-07 VITALS
DIASTOLIC BLOOD PRESSURE: 84 MMHG | WEIGHT: 186 LBS | BODY MASS INDEX: 32.96 KG/M2 | SYSTOLIC BLOOD PRESSURE: 128 MMHG | HEIGHT: 63 IN | HEART RATE: 82 BPM

## 2020-10-07 VITALS — BODY MASS INDEX: 32.96 KG/M2 | WEIGHT: 186 LBS | HEIGHT: 63 IN

## 2020-10-07 DIAGNOSIS — E78.5 DYSLIPIDEMIA: ICD-10-CM

## 2020-10-07 DIAGNOSIS — I34.0 NONRHEUMATIC MITRAL VALVE REGURGITATION: ICD-10-CM

## 2020-10-07 DIAGNOSIS — I48.0 PAROXYSMAL ATRIAL FIBRILLATION (HCC): Primary | ICD-10-CM

## 2020-10-07 DIAGNOSIS — Z79.01 CURRENT USE OF LONG TERM ANTICOAGULATION: ICD-10-CM

## 2020-10-07 DIAGNOSIS — I10 ESSENTIAL HYPERTENSION: ICD-10-CM

## 2020-10-07 PROCEDURE — 93306 TTE W/DOPPLER COMPLETE: CPT | Performed by: INTERNAL MEDICINE

## 2020-10-07 PROCEDURE — G8752 SYS BP LESS 140: HCPCS | Performed by: NURSE PRACTITIONER

## 2020-10-07 PROCEDURE — 3017F COLORECTAL CA SCREEN DOC REV: CPT | Performed by: NURSE PRACTITIONER

## 2020-10-07 PROCEDURE — G8432 DEP SCR NOT DOC, RNG: HCPCS | Performed by: NURSE PRACTITIONER

## 2020-10-07 PROCEDURE — G8417 CALC BMI ABV UP PARAM F/U: HCPCS | Performed by: NURSE PRACTITIONER

## 2020-10-07 PROCEDURE — G0463 HOSPITAL OUTPT CLINIC VISIT: HCPCS | Performed by: NURSE PRACTITIONER

## 2020-10-07 PROCEDURE — G8754 DIAS BP LESS 90: HCPCS | Performed by: NURSE PRACTITIONER

## 2020-10-07 PROCEDURE — G8427 DOCREV CUR MEDS BY ELIG CLIN: HCPCS | Performed by: NURSE PRACTITIONER

## 2020-10-07 PROCEDURE — 99213 OFFICE O/P EST LOW 20 MIN: CPT | Performed by: NURSE PRACTITIONER

## 2020-10-07 RX ORDER — FLUCONAZOLE 100 MG/1
100 TABLET ORAL
COMMUNITY

## 2020-10-07 NOTE — PROGRESS NOTES
[facility]  [phone]    HPI: Asher Peres is a 63y.o. year-old who presents for follow up regarding her Atrial Fib and CAD. She has her baseline EL currently, no PND  Does not check BP at home, slightly elevated today after walking due to pain in her ankle, /84 on recheck  Broke right ankle in May, did PT, not as active as she used to be due to this, has pain in ankle with walking  Her weight is up a couple more pounds  No palpitations or chest pain  Has heartburn and props head up at night sometimes  Has rare episodes of dizziness but no syncope or falls  No unusual bleeding or bruising  Has varicose veins but not much LE edema    Assessment/Plan:  1. Atrial fibrillation- initially occurred cesar-op knee surgery, then had recurrence s/p DCCV, doing well on Toprol XL 50mg daily  -CHADS2CVASC=3 (HTN, DM, sex), continue Eliquis 5mg BID, Hgb stable 13.7 in 3/20   -she will follow up with Dr. Ophelia Kimball in 6 months   2. Chest pain - no ischemia per nuclear stress test 6/18, no current exertional chest pain, no CAD by cardiac cath in 2000   3. S/p right knee replacement on 12/22/15 - followed by Dr. Aamir Macedo  4. DM Type 2 - A1c 7.7%, on insulin, management per PCP  5. HTN - controlled, continue Toprol XL and enalapril     6. Dyslipidemia - continue atorvastatin 80mg daily,  in 9/19    7. Vision trouble - had laser surgery, followed by Dr. Maciel De La Cruz, not discussed today  8. Dizziness - improved, minimal now  9. Asthma - on inhalers  10.   Mitral regurgitation - mild by TTE today, 2/6 BRAD    Echo 10/20 (prelim) - mild MR  Nuc Stress test 6/18 - no ischemia  Echo 3/17 - LVEF 55-60%, no WMA, trivial MR  Loop Monitor 5/16 - no arrhythmias  Brennon Nuc Stress 1/16 - no ischemia, LVEF 60%  Echo 12/15 - LVEF 55-60%, no WMA  Cardiac Cath 11/2000 - no CAD, no plaque, apical HK, LVEF 67%    Fam Hx: brother with MI  Soc Hx: no tobacco use    She  has a past medical history of Arthritis, Asthma, Atrial fibrillation ([facility] [address] Diabetes ([facility] [address] GERD (gastroesophageal reflux disease), Glaucoma, High cholesterol, Hypertension, Long term current use of anticoagulant therapy, Nausea & vomiting, and Psychiatric disorder. Cardiovascular ROS: no chest pain or palpitations  Respiratory ROS: no cough or wheezing  Neurological ROS: no TIA or stroke symptoms  All other systems negative except as above. PE  Vitals:    10/07/20 1400 10/07/20 1418   BP: (!) 142/66 128/84   Pulse: 82    Weight: 186 lb (84.4 kg)    Height: 5' 3\" (1.6 m)     Body mass index is 32.95 kg/m².    General appearance - alert, well appearing, and in no distress  Mental status - affect appropriate to mood  Eyes - sclera anicteric, moist mucous membranes  Neck - supple  Lymphatics - not assessed  Chest - clear to auscultation, no wheezes, rales or rhonchi  Heart - normal rate, regular rhythm, normal S1, S2, 2/6 BRAD   Abdomen - soft, nontender, nondistended  Back exam - full range of motion, no tenderness  Neurological - cranial nerves II through XII grossly intact, no focal deficit  Musculoskeletal - no muscular tenderness noted, normal strength  Extremities - peripheral pulses normal, no pedal edema  Skin - normal coloration  no rashes    Recent Labs:  No results found for: CHOL, CHOLX, CHLST, CHOLV, 816066, HDL, HDLP, LDL, LDLC, DLDLP, TGLX, TRIGL, TRIGP, CHHD, [facility]  Lab Results   Component Value Date/Time    Creatinine 0.56 12/24/2015 05:12 AM     Lab Results   Component Value Date/Time    BUN 8 12/24/2015 05:12 AM     Lab Results   Component Value Date/Time    Potassium 4.4 12/24/2015 05:12 AM     Lab Results   Component Value Date/Time    Hemoglobin A1c 8.1 (H) 12/15/2015 03:36 PM     Lab Results   Component Value Date/Time    HGB 13.7 03/20/2020 10:30 AM     Lab Results   Component Value Date/Time    PLATELET 944 76/74/4737 10:30 AM       Reviewed:  Past Medical History:   Diagnosis Date    Arthritis     Asthma     [facility] ([facility] [address]     [facility] ([facility]     GERD (gastroesophageal reflux disease)     Glaucoma     High cholesterol     Hypertension     Long term current use of anticoagulant therapy     Nausea & vomiting     Psychiatric disorder     DEPRESSION     Social History     Tobacco Use   Smoking Status Former Smoker    Packs/day: 0.25    Years: 10.00    Pack years: 2.50    Last attempt to quit: 12/15/2003    Years since quittin.8   Smokeless Tobacco Never Used     Social History     Substance and Sexual Activity   Alcohol Use No     Allergies   Allergen Reactions    Bactrim [Sulfamethoprim Ds] Rash and Nausea and Vomiting    Other Medication Rash     SURGICAL GLUE    Oxycodone Nausea and Vomiting       Current Outpatient Medications   Medication Sig    fluconazole (DIFLUCAN) 100 mg tablet Take 100 mg by mouth every seven (7) days. FDA advises cautious prescribing of oral fluconazole in pregnancy.  Eliquis 5 mg tablet TAKE 1 TABLET BY MOUTH TWICE A DAY    metoprolol succinate (TOPROL-XL) 50 mg XL tablet TAKE 1 TABLET BY MOUTH EVERY DAY    montelukast (SINGULAIR) 10 mg tablet Take 10 mg by mouth daily.  atorvastatin (LIPITOR) 40 mg tablet TAKE 1 TABLET BY MOUTH EVERY DAY AT NIGHT    enalapril (VASOTEC) 10 mg tablet Take 0.5 Tabs by mouth daily.  fluticasone-vilanterol (BREO ELLIPTA) 100-25 mcg/dose inhaler Take 1 Puff by inhalation daily.  dapagliflozin (FARXIGA) 10 mg tab tablet Take  by mouth daily.  acetaminophen (TYLENOL) 500 mg tablet Take 1 Tab by mouth every four (4) hours (while awake). (Patient taking differently: Take 500 mg by mouth every six (6) hours as needed.)    insulin aspart protamine/insulin aspart (NOVOLOG MIX 70-30 FLEXPEN) 100 unit/mL (70-30) flex pen by SubCUTAneous route three (3) times daily. PT TAKES ON SS, BUT SHE CANNOT TELL WHAT THE SCALE IS-STATES IT'S BASED ON WHAT SHE IS GOING TO EAT.     minocycline (MINOCIN, DYNACIN) 100 mg capsule Take  by mouth daily as needed.  pantoprazole (PROTONIX) 40 mg tablet Take 40 mg by mouth daily.  sucralfate (CARAFATE) 1 gram tablet Take 1 g by mouth four (4) times daily.  albuterol (PROVENTIL HFA, VENTOLIN HFA, PROAIR HFA) 90 mcg/actuation inhaler Take 2 Puffs by inhalation every four (4) hours as needed.  olopatadine (PATANOL) 0.1 % ophthalmic solution Administer 2 Drops to both eyes two (2) times a day.  peg 400-propylene glycol (SYSTANE GEL) 0.4-0.3 % drpg Apply  to eye two (2) times a day.  cycloSPORINE (RESTASIS) 0.05 % ophthalmic emulsion Administer 1 Drop to both eyes as needed.  fluticasone (FLONASE) 50 mcg/actuation nasal spray 2 Sprays by Both Nostrils route two (2) times a day. No current facility-administered medications for this visit.         Farida Wayne NP  [facility] [address] [facility] [address], [address] [place] [address]  [address] [facility], [address] [facility] of [facility]  [phone]

## 2020-10-08 LAB
ECHO AO ASC DIAM: 3.47 CM
ECHO AO ROOT DIAM: 2.93 CM
ECHO AV AREA PEAK VELOCITY: 2.01 CM2
ECHO AV AREA VTI: 1.87 CM2
ECHO AV AREA/BSA PEAK VELOCITY: 1.1 CM2/M2
ECHO AV AREA/BSA VTI: 1 CM2/M2
ECHO AV MEAN GRADIENT: 3.73 MMHG
ECHO AV PEAK GRADIENT: 6.78 MMHG
ECHO AV PEAK VELOCITY: 130.2 CM/S
ECHO AV VTI: 25.06 CM
ECHO LA AREA 4C: 15.28 CM2
ECHO LA MAJOR AXIS: 3.36 CM
ECHO LA MINOR AXIS: 1.79 CM
ECHO LA VOL 2C: 47.6 ML (ref 22–52)
ECHO LA VOL 4C: 38.06 ML (ref 22–52)
ECHO LA VOL BP: 47.04 ML (ref 22–52)
ECHO LA VOL/BSA BIPLANE: 25.09 ML/M2 (ref 16–28)
ECHO LA VOLUME INDEX A2C: 25.39 ML/M2 (ref 16–28)
ECHO LA VOLUME INDEX A4C: 20.3 ML/M2 (ref 16–28)
ECHO LV E' LATERAL VELOCITY: 9.06 CM/S
ECHO LV E' SEPTAL VELOCITY: 8.81 CM/S
ECHO LV EDV A2C: 39.64 ML
ECHO LV EDV A4C: 40.07 ML
ECHO LV EDV BP: 41.78 ML (ref 56–104)
ECHO LV EDV INDEX A4C: 21.4 ML/M2
ECHO LV EDV INDEX BP: 22.3 ML/M2
ECHO LV EDV NDEX A2C: 21.1 ML/M2
ECHO LV EJECTION FRACTION A2C: 68 PERCENT
ECHO LV EJECTION FRACTION A4C: 64 PERCENT
ECHO LV EJECTION FRACTION BIPLANE: 65.4 PERCENT (ref 55–100)
ECHO LV ESV A2C: 12.72 ML
ECHO LV ESV A4C: 14.35 ML
ECHO LV ESV BP: 14.46 ML (ref 19–49)
ECHO LV ESV INDEX A2C: 6.8 ML/M2
ECHO LV ESV INDEX A4C: 7.7 ML/M2
ECHO LV ESV INDEX BP: 7.7 ML/M2
ECHO LV INTERNAL DIMENSION DIASTOLIC: 3.75 CM (ref 3.9–5.3)
ECHO LV INTERNAL DIMENSION SYSTOLIC: 2.63 CM
ECHO LV IVSD: 1.24 CM (ref 0.6–0.9)
ECHO LV MASS 2D: 186.3 G (ref 67–162)
ECHO LV MASS INDEX 2D: 99.3 G/M2 (ref 43–95)
ECHO LV POSTERIOR WALL DIASTOLIC: 1.52 CM (ref 0.6–0.9)
ECHO LVOT DIAM: 2 CM
ECHO LVOT PEAK GRADIENT: 2.79 MMHG
ECHO LVOT PEAK VELOCITY: 83.53 CM/S
ECHO LVOT SV: 46.8 ML
ECHO LVOT VTI: 14.97 CM
ECHO MV A VELOCITY: 100.44 CM/S
ECHO MV E DECELERATION TIME (DT): 0.18 S
ECHO MV E VELOCITY: 63.23 CM/S
ECHO MV E/A RATIO: 0.63
ECHO MV E/E' LATERAL: 6.98
ECHO MV E/E' RATIO (AVERAGED): 7.08
ECHO MV E/E' SEPTAL: 7.18
ECHO PV MAX VELOCITY: 93.9 CM/S
ECHO PV PEAK INSTANTANEOUS GRADIENT SYSTOLIC: 3.53 MMHG
ECHO RV TAPSE: 2.03 CM (ref 1.5–2)
LA VOL DISK BP: 43.45 ML (ref 22–52)

## 2021-03-05 RX ORDER — METOPROLOL SUCCINATE 50 MG/1
TABLET, EXTENDED RELEASE ORAL
Qty: 90 TAB | Refills: 3 | Status: SHIPPED | OUTPATIENT
Start: 2021-03-05 | End: 2022-03-25

## 2021-03-19 ENCOUNTER — TELEPHONE (OUTPATIENT)
Dept: CARDIOLOGY CLINIC | Age: 61
End: 2021-03-19

## 2021-04-09 RX ORDER — APIXABAN 5 MG/1
TABLET, FILM COATED ORAL
Qty: 180 TAB | Refills: 3 | Status: SHIPPED | OUTPATIENT
Start: 2021-04-09 | End: 2022-03-30

## 2021-05-13 ENCOUNTER — OFFICE VISIT (OUTPATIENT)
Dept: CARDIOLOGY CLINIC | Age: 61
End: 2021-05-13
Payer: MEDICARE

## 2021-05-13 VITALS
BODY MASS INDEX: 32.43 KG/M2 | DIASTOLIC BLOOD PRESSURE: 80 MMHG | RESPIRATION RATE: 13 BRPM | SYSTOLIC BLOOD PRESSURE: 130 MMHG | HEART RATE: 78 BPM | WEIGHT: 183 LBS | HEIGHT: 63 IN | OXYGEN SATURATION: 97 %

## 2021-05-13 DIAGNOSIS — I48.0 PAROXYSMAL ATRIAL FIBRILLATION (HCC): ICD-10-CM

## 2021-05-13 DIAGNOSIS — I10 ESSENTIAL HYPERTENSION: Chronic | ICD-10-CM

## 2021-05-13 DIAGNOSIS — I25.10 CORONARY ARTERY DISEASE INVOLVING NATIVE CORONARY ARTERY OF NATIVE HEART WITHOUT ANGINA PECTORIS: ICD-10-CM

## 2021-05-13 DIAGNOSIS — E78.5 DYSLIPIDEMIA: Primary | Chronic | ICD-10-CM

## 2021-05-13 PROCEDURE — G8510 SCR DEP NEG, NO PLAN REQD: HCPCS | Performed by: INTERNAL MEDICINE

## 2021-05-13 PROCEDURE — G8417 CALC BMI ABV UP PARAM F/U: HCPCS | Performed by: INTERNAL MEDICINE

## 2021-05-13 PROCEDURE — 93005 ELECTROCARDIOGRAM TRACING: CPT | Performed by: INTERNAL MEDICINE

## 2021-05-13 PROCEDURE — G8427 DOCREV CUR MEDS BY ELIG CLIN: HCPCS | Performed by: INTERNAL MEDICINE

## 2021-05-13 PROCEDURE — 3017F COLORECTAL CA SCREEN DOC REV: CPT | Performed by: INTERNAL MEDICINE

## 2021-05-13 PROCEDURE — 99214 OFFICE O/P EST MOD 30 MIN: CPT | Performed by: INTERNAL MEDICINE

## 2021-05-13 PROCEDURE — G8754 DIAS BP LESS 90: HCPCS | Performed by: INTERNAL MEDICINE

## 2021-05-13 PROCEDURE — G0463 HOSPITAL OUTPT CLINIC VISIT: HCPCS | Performed by: INTERNAL MEDICINE

## 2021-05-13 PROCEDURE — 93010 ELECTROCARDIOGRAM REPORT: CPT | Performed by: INTERNAL MEDICINE

## 2021-05-13 PROCEDURE — G8752 SYS BP LESS 140: HCPCS | Performed by: INTERNAL MEDICINE

## 2021-05-13 RX ORDER — ENALAPRIL MALEATE 10 MG/1
10 TABLET ORAL DAILY
Qty: 45 TAB | Refills: 1 | Status: SHIPPED | OUTPATIENT
Start: 2021-05-13 | End: 2021-08-10

## 2021-05-13 NOTE — PROGRESS NOTES
[facility]  [id number]    HPI: Galo Crouch is a 57y.o. year-old who presents for follow up regarding her Atrial Fib and CAD. Not feeling good the last 2-3 days headache and pian in the neck, had eye injection yesterday and her eye pressure was way up and she goes back tomorrow and Monday to bring that pressure down. BP is good. No bleeding on the eliquis. Breathing is stalel, some shortness of breath in the night, like a tightness in the chest liek she needs to take deep breaths. No asthma flares. Doesn't snore. A1C 7.4, cholesterol is at goal, per Dr. Jeanna Chatman. Had blood in her eye a month ago but now it is ok. With blood in the eye will need to evaluate her for Watch[facility]. On coumadin in 2014, Eliquis since then. Her mother is having stress testing here with Dr. Donnell Chaudhari, and she is under a lot of stress. No aspirin for now as on Eliquis. She has her baseline EL currently, no PND  Has heartburn and props head up at night sometimes  Has varicose veins but not much LE edema    Needs carotid - get [facility]  Echo later this year  Ok to proceed with eye surgery, hold eliquis 2 days prior as needed, reviewed with her today and clearance sent to her eye physicians    Assessment/Plan:  1. Atrial fibrillation- initially occurred cesar-op knee surgery, then had recurrence s/p DCCV, doing well on Toprol XL 50mg daily  -CHADS2CVASC=3 (HTN, DM, sex), continue Eliquis 5mg BID, Hgb stable 13.7 in 3/20   2. Chest pain - no ischemia per nuclear stress test 6/18, no current exertional chest pain, no CAD by cardiac cath in 2000   3. S/p right knee replacement on 12/22/15 - followed by Dr. Darling Esparza  4. DM Type 2 - A1c 7.7%, on insulin, management per PCP  5. HTN - controlled, continue Toprol XL and enalapril     6. Dyslipidemia - continue atorvastatin 40mg daily  7. Vision trouble - had laser surgery, followed by Dr. Luther Jiménez glaucoma, high intraocular pressures  8.  Dizziness - improved, minimal now  9. Asthma - on inhalers  10. Mitral regurgitation - mild by TTE, , 2/6 BRAD    Echo 10/20 Ef 60% - mild MR  Nuc Stress test 6/18 - no ischemia Ef 69%  Echo 3/17 - LVEF 55-60%, no WMA, trivial MR  Loop Monitor 5/16 - no arrhythmias  Brennon Nuc Stress 1/16 - no ischemia, LVEF 60%  Echo 12/15 - LVEF 55-60%, no WMA  Cardiac Cath 11/2000 - no CAD, no plaque, apical HK, LVEF 67%    Fam Hx: brother with MI  Soc Hx: no tobacco use    She  has a past medical history of Arthritis, Asthma, Atrial fibrillation ([facility] [address] Diabetes ([facility] [address] GERD (gastroesophageal reflux disease), Glaucoma, High cholesterol, Hypertension, Long term current use of anticoagulant therapy, Nausea & vomiting, and Psychiatric disorder. Cardiovascular ROS: no chest pain or palpitations  Respiratory ROS: no cough or wheezing  Neurological ROS: no TIA or stroke symptoms  All other systems negative except as above. PE  Vitals:    05/13/21 1438   BP: 130/80   Pulse: 78   Resp: 13   SpO2: 97%   Weight: 183 lb (83 kg)   Height: 5' 3\" (1.6 m)    Body mass index is 32.42 kg/m².    General appearance - alert, well appearing, and in no distress  Mental status - affect appropriate to mood  Eyes - sclera anicteric, moist mucous membranes  Neck - supple  Lymphatics - not assessed  Chest - clear to auscultation, no wheezes, rales or rhonchi  Heart - normal rate, regular rhythm, normal S1, S2, 2/6 BRAD   Abdomen - soft, nontender, nondistended  Back exam - full range of motion, no tenderness  Neurological - cranial nerves II through XII grossly intact, no focal deficit  Musculoskeletal - no muscular tenderness noted, normal strength  Extremities - peripheral pulses normal, no pedal edema  Skin - normal coloration  no rashes    Recent Labs:  No results found for: CHOL, CHOLX, CHLST, CHOLV, 111624, HDL, HDLP, LDL, LDLC, DLDLP, TGLX, TRIGL, TRIGP, CHHD, CHHDX  Lab Results   Component Value Date/Time    Creatinine 0.56 12/24/2015 05:12 AM     Lab Results   Component Value Date/Time    BUN 8 2015 05:12 AM     Lab Results   Component Value Date/Time    Potassium 4.4 2015 05:12 AM     Lab Results   Component Value Date/Time    Hemoglobin A1c 8.1 (H) 12/15/2015 03:36 PM     Lab Results   Component Value Date/Time    HGB 13.7 2020 10:30 AM     Lab Results   Component Value Date/Time    PLATELET 453  10:30 AM       Reviewed:  Past Medical History:   Diagnosis Date    Arthritis     Asthma     Atrial fibrillation (HCC)     Diabetes ([facility] [address]     GERD (gastroesophageal reflux disease)     Glaucoma     High cholesterol     Hypertension     Long term current use of anticoagulant therapy     Nausea & vomiting     Psychiatric disorder     DEPRESSION     Social History     Tobacco Use   Smoking Status Former Smoker    Packs/day: 0.25    Years: 10.00    Pack years: 2.50    Quit date: 12/15/2003    Years since quittin.4   Smokeless Tobacco Never Used     Social History     Substance and Sexual Activity   Alcohol Use No     Allergies   Allergen Reactions    Bactrim [Sulfamethoprim Ds] Rash and Nausea and Vomiting    Other Medication Rash     SURGICAL GLUE    Oxycodone Nausea and Vomiting       Current Outpatient Medications   Medication Sig    Eliquis 5 mg tablet TAKE 1 TABLET BY MOUTH TWICE A DAY    metoprolol succinate (TOPROL-XL) 50 mg XL tablet TAKE 1 TABLET BY MOUTH EVERY DAY    enalapril (VASOTEC) 10 mg tablet TAKE 1/2 TABLET BY MOUTH EVERY DAY    fluconazole (DIFLUCAN) 100 mg tablet Take 100 mg by mouth every seven (7) days. FDA advises cautious prescribing of oral fluconazole in pregnancy.  montelukast (SINGULAIR) 10 mg tablet Take 10 mg by mouth daily.  atorvastatin (LIPITOR) 40 mg tablet TAKE 1 TABLET BY MOUTH EVERY DAY AT NIGHT    fluticasone-vilanterol (BREO ELLIPTA) 100-25 mcg/dose inhaler Take 1 Puff by inhalation daily.  dapagliflozin (FARXIGA) 10 mg tab tablet Take  by mouth daily.     acetaminophen (TYLENOL) 500 mg tablet Take 1 Tab by mouth every four (4) hours (while awake). (Patient taking differently: Take 500 mg by mouth every six (6) hours as needed.)    insulin aspart protamine/insulin aspart (NOVOLOG MIX 70-30 FLEXPEN) 100 unit/mL (70-30) flex pen by SubCUTAneous route three (3) times daily. PT TAKES ON SS, BUT SHE CANNOT TELL WHAT THE SCALE IS-STATES IT'S BASED ON WHAT SHE IS GOING TO EAT.  minocycline (MINOCIN, DYNACIN) 100 mg capsule Take  by mouth daily as needed.  pantoprazole (PROTONIX) 40 mg tablet Take 40 mg by mouth daily.  sucralfate (CARAFATE) 1 gram tablet Take 1 g by mouth four (4) times daily.  albuterol (PROVENTIL HFA, VENTOLIN HFA, PROAIR HFA) 90 mcg/actuation inhaler Take 2 Puffs by inhalation every four (4) hours as needed.  olopatadine (PATANOL) 0.1 % ophthalmic solution Administer 2 Drops to both eyes two (2) times a day.  peg 400-propylene glycol (SYSTANE GEL) 0.4-0.3 % drpg Apply  to eye two (2) times a day.  cycloSPORINE (RESTASIS) 0.05 % ophthalmic emulsion Administer 1 Drop to both eyes as needed.  fluticasone (FLONASE) 50 mcg/actuation nasal spray 2 Sprays by Both Nostrils route two (2) times a day. No current facility-administered medications for this visit.         Lexi Vergara MD  [facility] [address] [facility] [address], [address] [place] [address]  [address]  [phone]

## 2021-05-13 NOTE — PATIENT INSTRUCTIONS
Carotid Ultrasound last month at [facility] has been requested. Dr. Madeleine Israel with [address] [place] [address] Dr office note requested Fax # [phone].

## 2021-07-07 ENCOUNTER — OFFICE VISIT (OUTPATIENT)
Dept: CARDIOLOGY CLINIC | Age: 61
End: 2021-07-07
Payer: MEDICARE

## 2021-07-07 VITALS
HEIGHT: 63 IN | HEART RATE: 85 BPM | SYSTOLIC BLOOD PRESSURE: 110 MMHG | DIASTOLIC BLOOD PRESSURE: 62 MMHG | WEIGHT: 182 LBS | RESPIRATION RATE: 16 BRPM | BODY MASS INDEX: 32.25 KG/M2 | OXYGEN SATURATION: 96 %

## 2021-07-07 DIAGNOSIS — I48.0 PAROXYSMAL ATRIAL FIBRILLATION (HCC): Primary | ICD-10-CM

## 2021-07-07 PROCEDURE — G8417 CALC BMI ABV UP PARAM F/U: HCPCS | Performed by: INTERNAL MEDICINE

## 2021-07-07 PROCEDURE — G8427 DOCREV CUR MEDS BY ELIG CLIN: HCPCS | Performed by: INTERNAL MEDICINE

## 2021-07-07 PROCEDURE — 3017F COLORECTAL CA SCREEN DOC REV: CPT | Performed by: INTERNAL MEDICINE

## 2021-07-07 PROCEDURE — G8754 DIAS BP LESS 90: HCPCS | Performed by: INTERNAL MEDICINE

## 2021-07-07 PROCEDURE — G8752 SYS BP LESS 140: HCPCS | Performed by: INTERNAL MEDICINE

## 2021-07-07 PROCEDURE — G0463 HOSPITAL OUTPT CLINIC VISIT: HCPCS | Performed by: INTERNAL MEDICINE

## 2021-07-07 PROCEDURE — G8510 SCR DEP NEG, NO PLAN REQD: HCPCS | Performed by: INTERNAL MEDICINE

## 2021-07-07 PROCEDURE — 99214 OFFICE O/P EST MOD 30 MIN: CPT | Performed by: INTERNAL MEDICINE

## 2021-07-07 NOTE — PATIENT INSTRUCTIONS
An order has been sent for a chest CTA. If you are not  contacted in the next week, please call central scheduling at [phone]. Follow up with Dr. Fely Humphrey in 6-8 weeks. [facility]man brochure provided to patient.

## 2021-07-07 NOTE — LETTER
7/20/2021    Patient: Cricket Cochran   YOB: 1960   Date of Visit: 7/7/2021     Pily Shi MD  [facility] [facility] [address]  Via Fax: [phone]    Dear Pily Shi MD,      Thank you for referring Ms. Diego Lyn to [address]  for evaluation. My notes for this consultation are attached. If you have questions, please do not hesitate to call me. I look forward to following your patient along with you.       Sincerely,    Urmila Park MD

## 2021-07-13 ENCOUNTER — HOSPITAL ENCOUNTER (OUTPATIENT)
Dept: CT IMAGING | Age: 61
Discharge: HOME | End: 2021-07-13
Attending: INTERNAL MEDICINE
Payer: MEDICARE

## 2021-07-13 DIAGNOSIS — I48.0 PAROXYSMAL ATRIAL FIBRILLATION (HCC): ICD-10-CM

## 2021-07-13 LAB — CREAT BLD-MCNC: 0.5 MG/DL (ref 0.6–1.3)

## 2021-07-13 PROCEDURE — 71275 CT ANGIOGRAPHY CHEST: CPT

## 2021-07-13 PROCEDURE — 74011000636 HC RX REV CODE- 636: Performed by: INTERNAL MEDICINE

## 2021-07-13 PROCEDURE — 82565 ASSAY OF CREATININE: CPT

## 2021-07-13 RX ADMIN — IOPAMIDOL 100 ML: 755 INJECTION, SOLUTION INTRAVENOUS at 15:07

## 2021-07-20 NOTE — PROGRESS NOTES
Dr. Alvino Orellana. [phone]            Cardiology structural heart disease consult/Progress Note      Requesting/referring provider: Marcelyn Nageotte, MD,Young Briseno NP, [address]    Reason for Consult: Discussion of alternative to long-term oral anticoagulation    Assessment/Plan:  1. Recurrent paroxysmal atrial fibrillation  2. Recurrent intraocular hemorrhage risking vision loss  3. Hypertension  4. Type 2 diabetes mellitus  5. History of heart failure with preservation fraction  6. History of atypical chest discomfort  7. History of asthma    Ms. Lashon Khalil was seen for discussing alternatives to long-term oral anticoagulation. She has previously been on OAC's with history of recurrent intraocular hemorrhage requiring laser surgery. She is concerned regarding recurrent risk of bleeding in her eye on OAC's. Although temporarily she has been reinitiated on Eliquis it may be reasonable to consider alternatives such as left atrial appendage occlusion occlusion with the watchman device. Based on both stroke and bleeding risk, a shared decision has been made to pursue closure of left atrial appendage as a safe and effective alternative to oral anticoagulant therapy for stroke prophylaxis and to reduce their long term risk of bleeding. Her cardiologist Dr. Saurav Rich has been involved in this decision making. I will set her up to undergo a CTA to assess the anatomy of her left atrial appendage occlusion to see if this is suitable for watchman based closure or not. In the interim we should continue [address] is at least short-term. She appears to have good blood pressure control on current dose of enalapril. Does not report of any active anginal symptoms.       Investigations ordered: BMP, CTA watchman protocol    []    High complexity decision making was performed  []    Patient is at high-risk of decompensation with multiple organ involvement  []    Complex/difficult social determinants of health outcomes  Total of minutes were spent on reviewing the records, analyzing investigations and documentation in the chart, on the day of visit in addition to examination and time spent with the patient  Investigations personally reviewed and interpreted  Prior echocardiogram from October 2020 was reviewed. Shows mild to moderate concentric LVH with prominent papillary muscle with mild mitral regurgitation normal LV function with no regional wall motion abnormalities  ECG from May 2021: Sinus rhythm, nonspecific ST-T changes    HPI: Cricket Cochran, a 57y.o. year-old who is seen for evaluation of bleeding in the setting of OAC's and management of A. fib. She has long-term history of atrial fibrillation, hypertension, asthma, suspected CAD, hypertension, hyperlipidemia, diabetes. Her chads 2 vascular is elevated. Recently while she was not on oral anticoagulation she had a retinal hemorrhage and then recurred. She subsequently required laser-based intervention to preserve vision in the right eye. Her discussion with her ophthalmologist suggested she is at her high risk of recurrent intraocular/retinal bleeding. While she has been reinitiated on oral anticoagulation temporarily given high [id number] score she does not appear to be a good candidate for long-term oral anticoagulation given long-term recurrent intraocular bleeding risk from underlying diabetes. And she is here to discuss alternatives. dShe  has a past medical history of Arthritis, Asthma, Atrial fibrillation ([address] Diabetes ([address] GERD (gastroesophageal reflux disease), Glaucoma, High cholesterol, Hypertension, Long term current use of anticoagulant therapy, Nausea & vomiting, and Psychiatric disorder. Review of system:Patient reports no dyspnea/PND/Orthpnea/CP. She reports no cough/fever/focal neurological deficits/abdominal pain. All other systems negative except as above. Family History   Problem Relation Age of Onset    Cancer Father         PROSTATE    Heart Attack Brother     Anesth Problems Neg Hx       Social History     Socioeconomic History    Marital status: LEGALLY      Spouse name: Not on file    Number of children: Not on file    Years of education: Not on file    Highest education level: Not on file   Tobacco Use    Smoking status: Former Smoker     Packs/day: 0.25     Years: 10.00     Pack years: 2.50     Quit date: 12/15/2003     Years since quittin.6    Smokeless tobacco: Never Used   Substance and Sexual Activity    Alcohol use: No    Drug use: No     Social Determinants of Health     Financial Resource Strain:     Difficulty of Paying Living Expenses:    Food Insecurity:     Worried About Running Out of Food in the Last Year:     Ran Out of Food in the Last Year:    Transportation Needs:     Lack of Transportation (Medical):  Lack of Transportation (Non-Medical):    Physical Activity:     Days of Exercise per Week:     Minutes of Exercise per Session:    Stress:     Feeling of Stress :    Social Connections:     Frequency of Communication with Friends and Family:     Frequency of Social Gatherings with Friends and Family:     Attends Anglican Services:     Active Member of Clubs or Organizations:     Attends Club or Organization Meetings:     Marital Status:       PE  Vitals:    21 1523   BP: 110/62   Pulse: 85   Resp: 16   SpO2: 96%   Weight: 182 lb (82.6 kg)   Height: 5' 3\" (1.6 m)    Body mass index is 32.24 kg/m². General:    Alert, cooperative, no distress. Psychiatric:    Normal Mood and affect    Eye/ENT:      Pupils equal, No asymmetry, Conjunctival pink. Able to hear voice at normal amplitude   Lungs:      Visibly symmetric chest expansion, No palpable tenderness. Clear to auscultation bilaterally. Heart[de-identified]    Regular rate and rhythm, S1, S2 normal, no murmur, click, rub or gallop. No JVD, Normal palpable peripheral pulses. No cyanosis   Abdomen:     Soft, non-tender. Bowel sounds normal. No masses,  No      organomegaly. Extremities:   Extremities normal, atraumatic, no edema. Neurologic:   CN II-XII grossly intact.  No gross focal deficits           Recent Labs:  No results found for: CHOL, CHOLX, CHLST, CHOLV, 818333, HDL, HDLP, LDL, LDLC, DLDLP, TGLX, TRIGL, TRIGP, CHHD, CHHDX  Lab Results   Component Value Date/Time    Creatinine (POC) 0.50 (L) 2021 03:02 PM    Creatinine 0.56 2015 05:12 AM     Lab Results   Component Value Date/Time    BUN 8 2015 05:12 AM     Lab Results   Component Value Date/Time    Potassium 4.4 2015 05:12 AM     Lab Results   Component Value Date/Time    Hemoglobin A1c 8.1 (H) 12/15/2015 03:36 PM     Lab Results   Component Value Date/Time    HGB 13.7 2020 10:30 AM     Lab Results   Component Value Date/Time    PLATELET 335 1150 10:30 AM       Reviewed:  Past Medical History:   Diagnosis Date    Arthritis     Asthma     Atrial fibrillation (HCC)     Diabetes [facility]     GERD (gastroesophageal reflux disease)     Glaucoma     High cholesterol     Hypertension     Long term current use of anticoagulant therapy     Nausea & vomiting     Psychiatric disorder     DEPRESSION     Social History     Tobacco Use   Smoking Status Former Smoker    Packs/day: 0.25    Years: 10.00    Pack years: 2.50    Quit date: 12/15/2003    Years since quittin.6   Smokeless Tobacco Never Used     Social History     Substance and Sexual Activity   Alcohol Use No     Allergies   Allergen Reactions    Bactrim [Sulfamethoprim Ds] Rash and Nausea and Vomiting    Other Medication Rash     SURGICAL GLUE    Oxycodone Nausea and Vomiting     Family History   Problem Relation Age of Onset    Cancer Father         PROSTATE    Heart Attack Brother     Anesth Problems Neg Hx         Current Outpatient Medications   Medication Sig    enalapril (VASOTEC) 10 mg tablet Take 1 Tab by mouth daily.  Eliquis 5 mg tablet TAKE 1 TABLET BY MOUTH TWICE A DAY    metoprolol succinate (TOPROL-XL) 50 mg XL tablet TAKE 1 TABLET BY MOUTH EVERY DAY    fluconazole (DIFLUCAN) 100 mg tablet Take 100 mg by mouth every seven (7) days. FDA advises cautious prescribing of oral fluconazole in pregnancy.  montelukast (SINGULAIR) 10 mg tablet Take 10 mg by mouth daily.  atorvastatin (LIPITOR) 40 mg tablet TAKE 1 TABLET BY MOUTH EVERY DAY AT NIGHT    fluticasone-vilanterol (BREO ELLIPTA) 100-25 mcg/dose inhaler Take 1 Puff by inhalation daily.  dapagliflozin (FARXIGA) 10 mg tab tablet Take  by mouth daily.  acetaminophen (TYLENOL) 500 mg tablet Take 1 Tab by mouth every four (4) hours (while awake). (Patient taking differently: Take 500 mg by mouth every six (6) hours as needed.)    insulin aspart protamine/insulin aspart (NOVOLOG MIX 70-30 FLEXPEN) 100 unit/mL (70-30) flex pen by SubCUTAneous route three (3) times daily. PT TAKES ON SS, BUT SHE CANNOT TELL WHAT THE SCALE IS-STATES IT'S BASED ON WHAT SHE IS GOING TO EAT.  minocycline (MINOCIN, DYNACIN) 100 mg capsule Take  by mouth daily as needed.  pantoprazole (PROTONIX) 40 mg tablet Take 40 mg by mouth daily.  sucralfate (CARAFATE) 1 gram tablet Take 1 g by mouth four (4) times daily.  albuterol (PROVENTIL HFA, VENTOLIN HFA, PROAIR HFA) 90 mcg/actuation inhaler Take 2 Puffs by inhalation every four (4) hours as needed.  olopatadine (PATANOL) 0.1 % ophthalmic solution Administer 2 Drops to both eyes two (2) times a day.  peg 400-propylene glycol (SYSTANE GEL) 0.4-0.3 % drpg Apply  to eye two (2) times a day.  cycloSPORINE (RESTASIS) 0.05 % ophthalmic emulsion Administer 1 Drop to both eyes as needed.  fluticasone (FLONASE) 50 mcg/actuation nasal spray 2 Sprays by Both Nostrils route two (2) times a day. No current facility-administered medications for this visit.        Magdiel Rosas MD07/20/21 ATTENTION:   This medical record was transcribed using an electronic medical records/speech recognition system. Although proofread, it may and can contain electronic, spelling and other errors. Corrections may be executed at a later time. Please feel free to contact us for any clarifications as needed.     [facility] [place]  [facility], [place], [place]. [phone]

## 2021-08-10 RX ORDER — ENALAPRIL MALEATE 10 MG/1
TABLET ORAL
Qty: 90 TABLET | Refills: 3 | Status: SHIPPED | OUTPATIENT
Start: 2021-08-10 | End: 2022-05-13 | Stop reason: SDUPTHER

## 2021-09-08 ENCOUNTER — OFFICE VISIT (OUTPATIENT)
Dept: CARDIOLOGY CLINIC | Age: 61
End: 2021-09-08
Payer: MEDICARE

## 2021-09-08 VITALS
DIASTOLIC BLOOD PRESSURE: 62 MMHG | OXYGEN SATURATION: 97 % | HEIGHT: 63 IN | WEIGHT: 175 LBS | BODY MASS INDEX: 31.01 KG/M2 | HEART RATE: 72 BPM | SYSTOLIC BLOOD PRESSURE: 116 MMHG

## 2021-09-08 DIAGNOSIS — I25.10 CORONARY ARTERY DISEASE INVOLVING NATIVE CORONARY ARTERY OF NATIVE HEART WITHOUT ANGINA PECTORIS: ICD-10-CM

## 2021-09-08 DIAGNOSIS — I48.0 PAROXYSMAL ATRIAL FIBRILLATION (HCC): Primary | ICD-10-CM

## 2021-09-08 DIAGNOSIS — I10 ESSENTIAL HYPERTENSION: Chronic | ICD-10-CM

## 2021-09-08 PROCEDURE — 3017F COLORECTAL CA SCREEN DOC REV: CPT | Performed by: INTERNAL MEDICINE

## 2021-09-08 PROCEDURE — G8754 DIAS BP LESS 90: HCPCS | Performed by: INTERNAL MEDICINE

## 2021-09-08 PROCEDURE — G8752 SYS BP LESS 140: HCPCS | Performed by: INTERNAL MEDICINE

## 2021-09-08 PROCEDURE — G8427 DOCREV CUR MEDS BY ELIG CLIN: HCPCS | Performed by: INTERNAL MEDICINE

## 2021-09-08 PROCEDURE — G8417 CALC BMI ABV UP PARAM F/U: HCPCS | Performed by: INTERNAL MEDICINE

## 2021-09-08 PROCEDURE — G0463 HOSPITAL OUTPT CLINIC VISIT: HCPCS | Performed by: INTERNAL MEDICINE

## 2021-09-08 PROCEDURE — G8432 DEP SCR NOT DOC, RNG: HCPCS | Performed by: INTERNAL MEDICINE

## 2021-09-08 PROCEDURE — 99214 OFFICE O/P EST MOD 30 MIN: CPT | Performed by: INTERNAL MEDICINE

## 2021-09-08 NOTE — PROGRESS NOTES
Dr. Glenn Marin. [phone]            Cardiology structural heart disease consult/Progress Note      Requesting/referring provider: Autumn Guadarrama MD    Reason for Consult: Discussion of alternative to long-term oral anticoagulation    Assessment/Plan:  1. Recurrent paroxysmal atrial fibrillation  2. Recurrent intraocular hemorrhage risking vision loss  3. Hypertension  4. Type 2 diabetes mellitus  5. History of heart failure with preservation fraction  6. History of atypical chest discomfort  7. History of asthma    Ms. Mary Ann Burns was seen for discussing alternatives to long-term oral anticoagulation. She has previously been on OAC's with history of recurrent intraocular hemorrhage requiring laser surgery. She is concerned regarding recurrent risk of bleeding in her eye on OAC's. Although temporarily she has been reinitiated on Eliquis it may be reasonable to consider alternatives such as left atrial appendage occlusion occlusion with the watchman device. Based on both stroke and bleeding risk, a shared decision has been made to pursue closure of left atrial appendage as a safe and effective alternative to oral anticoagulant therapy for stroke prophylaxis and to reduce their long term risk of bleeding. Her CTA shows adequate candidacy for possible watchman implantation. For now however patient wants to hold off until there are any other bleeding concerns. Continue oral anticoagulation for now. We will plan to see her back in 6 months for atypical chest discomfort and history of paroxysmal atrial fibrillation. Blood pressure appears to be pretty well controlled for now. Continue Eliquis and metoprolol for A. fib.         Investigations ordered:     []    High complexity decision making was performed  []    Patient is at high-risk of decompensation with multiple organ involvement  []    Complex/difficult social determinants of health outcomes  Total of minutes were spent on reviewing the records, analyzing investigations and documentation in the chart, on the day of visit in addition to examination and time spent with the patient  Investigations personally reviewed and interpreted  Prior echocardiogram from October 2020 was reviewed. Shows mild to moderate concentric LVH with prominent papillary muscle with mild mitral regurgitation normal LV function with no regional wall motion abnormalities  ECG from May 2021: Sinus rhythm, nonspecific ST-T changes    HPI: Hugh Gonzáles, a 57y.o. year-old who is seen for evaluation of bleeding in the setting of OAC's and management of A. fib. She has long-term history of atrial fibrillation, hypertension, asthma, suspected CAD, hypertension, hyperlipidemia, diabetes. Her chads 2 vascular is elevated. Recently while she was not on oral anticoagulation she had a retinal hemorrhage and then recurred. She subsequently required laser-based intervention to preserve vision in the right eye. Her discussion with her ophthalmologist suggested she is at her high risk of recurrent intraocular/retinal bleeding. While she has been reinitiated on oral anticoagulation temporarily given high [id number] score she does not appear to be a good candidate for long-term oral anticoagulation given long-term recurrent intraocular bleeding risk from underlying diabetes. And she is here to discuss alternatives. dShe  has a past medical history of Arthritis, Asthma, Atrial fibrillation ([address] Diabetes ([address] GERD (gastroesophageal reflux disease), Glaucoma, High cholesterol, Hypertension, Long term current use of anticoagulant therapy, Nausea & vomiting, and Psychiatric disorder. Review of system:Patient reports no dyspnea/PND/Orthpnea/CP. She reports no cough/fever/focal neurological deficits/abdominal pain. All other systems negative except as above.    Family History   Problem Relation Age of Onset    Cancer Father PROSTATE    Heart Attack Brother     Anesth Problems Neg Hx       Social History     Socioeconomic History    Marital status: LEGALLY      Spouse name: Not on file    Number of children: Not on file    Years of education: Not on file    Highest education level: Not on file   Tobacco Use    Smoking status: Former Smoker     Packs/day: 0.25     Years: 10.00     Pack years: 2.50     Quit date: 12/15/2003     Years since quittin.7    Smokeless tobacco: Never Used   Substance and Sexual Activity    Alcohol use: No    Drug use: No     Social Determinants of Health     Financial Resource Strain:     Difficulty of Paying Living Expenses:    Food Insecurity:     Worried About Running Out of Food in the Last Year:     Ran Out of Food in the Last Year:    Transportation Needs:     Lack of Transportation (Medical):  Lack of Transportation (Non-Medical):    Physical Activity:     Days of Exercise per Week:     Minutes of Exercise per Session:    Stress:     Feeling of Stress :    Social Connections:     Frequency of Communication with Friends and Family:     Frequency of Social Gatherings with Friends and Family:     Attends Zoroastrian Services:     Active Member of Clubs or Organizations:     Attends Club or Organization Meetings:     Marital Status:       PE  Vitals:    21 1442   BP: 116/62   Pulse: 72   SpO2: 97%   Weight: 175 lb (79.4 kg)   Height: 5' 3\" (1.6 m)    Body mass index is 31 kg/m². General:    Alert, cooperative, no distress. Psychiatric:    Normal Mood and affect    Eye/ENT:      Pupils equal, No asymmetry, Conjunctival pink. Able to hear voice at normal amplitude   Lungs:      Visibly symmetric chest expansion, No palpable tenderness. Clear to auscultation bilaterally. Heart[de-identified]    Regular rate and rhythm, S1, S2 normal, no murmur, click, rub or gallop. No JVD, Normal palpable peripheral pulses. No cyanosis   Abdomen:     Soft, non-tender.  Bowel sounds normal. No masses,  No      organomegaly. Extremities:   Extremities normal, atraumatic, no edema. Neurologic:   CN II-XII grossly intact.  No gross focal deficits           Recent Labs:  No results found for: CHOL, CHOLX, CHLST, CHOLV, [id number], HDL, HDLP, LDL, LDLC, DLDLP, TGLX, TRIGL, TRIGP, CHHD, CHHDX  Lab Results   Component Value Date/Time    Creatinine (POC) 0.50 (L) 2021 03:02 PM    Creatinine 0.56 2015 05:12 AM     Lab Results   Component Value Date/Time    BUN 8 2015 05:12 AM     Lab Results   Component Value Date/Time    Potassium 4.4 2015 05:12 AM     Lab Results   Component Value Date/Time    Hemoglobin A1c 8.1 (H) 12/15/2015 03:36 PM     Lab Results   Component Value Date/Time    HGB 13.7 2020 10:30 AM     Lab Results   Component Value Date/Time    PLATELET 897  10:30 AM       Reviewed:  Past Medical History:   Diagnosis Date    Arthritis     Asthma     Atrial fibrillation (HCC)     Diabetes ([facility]     GERD (gastroesophageal reflux disease)     Glaucoma     High cholesterol     Hypertension     Long term current use of anticoagulant therapy     Nausea & vomiting     Psychiatric disorder     DEPRESSION     Social History     Tobacco Use   Smoking Status Former Smoker    Packs/day: 0.25    Years: 10.00    Pack years: 2.50    Quit date: 12/15/2003    Years since quittin.7   Smokeless Tobacco Never Used     Social History     Substance and Sexual Activity   Alcohol Use No     Allergies   Allergen Reactions    Bactrim [Sulfamethoprim Ds] Rash and Nausea and Vomiting    Other Medication Rash     SURGICAL GLUE    Oxycodone Nausea and Vomiting     Family History   Problem Relation Age of Onset    Cancer Father         PROSTATE    Heart Attack Brother     Anesth Problems Neg Hx         Current Outpatient Medications   Medication Sig    enalapril (VASOTEC) 10 mg tablet TAKE 1 TABLET BY MOUTH EVERY DAY    Eliquis 5 mg tablet TAKE 1 TABLET BY MOUTH TWICE A DAY    metoprolol succinate (TOPROL-XL) 50 mg XL tablet TAKE 1 TABLET BY MOUTH EVERY DAY    fluconazole (DIFLUCAN) 100 mg tablet Take 100 mg by mouth every seven (7) days. FDA advises cautious prescribing of oral fluconazole in pregnancy.  montelukast (SINGULAIR) 10 mg tablet Take 10 mg by mouth daily.  atorvastatin (LIPITOR) 40 mg tablet TAKE 1 TABLET BY MOUTH EVERY DAY AT NIGHT    fluticasone-vilanterol (BREO ELLIPTA) 100-25 mcg/dose inhaler Take 1 Puff by inhalation daily.  dapagliflozin (FARXIGA) 10 mg tab tablet Take  by mouth daily.  acetaminophen (TYLENOL) 500 mg tablet Take 1 Tab by mouth every four (4) hours (while awake). (Patient taking differently: Take 500 mg by mouth every six (6) hours as needed.)    insulin aspart protamine/insulin aspart (NOVOLOG MIX 70-30 FLEXPEN) 100 unit/mL (70-30) flex pen by SubCUTAneous route three (3) times daily. PT TAKES ON SS, BUT SHE CANNOT TELL WHAT THE SCALE IS-STATES IT'S BASED ON WHAT SHE IS GOING TO EAT.  minocycline (MINOCIN, DYNACIN) 100 mg capsule Take  by mouth daily as needed.  pantoprazole (PROTONIX) 40 mg tablet Take 40 mg by mouth daily.  sucralfate (CARAFATE) 1 gram tablet Take 1 g by mouth four (4) times daily.  albuterol (PROVENTIL HFA, VENTOLIN HFA, PROAIR HFA) 90 mcg/actuation inhaler Take 2 Puffs by inhalation every four (4) hours as needed.  olopatadine (PATANOL) 0.1 % ophthalmic solution Administer 2 Drops to both eyes two (2) times a day.  peg 400-propylene glycol (SYSTANE GEL) 0.4-0.3 % drpg Apply  to eye two (2) times a day.  cycloSPORINE (RESTASIS) 0.05 % ophthalmic emulsion Administer 1 Drop to both eyes as needed.  fluticasone (FLONASE) 50 mcg/actuation nasal spray 2 Sprays by Both Nostrils route two (2) times a day. No current facility-administered medications for this visit.        Jeronimo Maier MD09/08/21     ATTENTION:   This medical record was transcribed using an electronic medical records/speech recognition system. Although proofread, it may and can contain electronic, spelling and other errors. Corrections may be executed at a later time. Please feel free to contact us for any clarifications as needed.     [employer] [facility] [place]  [facility], [facility],  [place], [place]. [phone]

## 2021-09-24 LAB — HBA1C MFR BLD HPLC: 7.4 %

## 2021-11-01 ENCOUNTER — TELEPHONE (OUTPATIENT)
Dept: CARDIOLOGY CLINIC | Age: 61
End: 2021-11-01

## 2021-11-01 NOTE — TELEPHONE ENCOUNTER
Patient said she was having some chest pain and sweating. Schedule her for an appointment with NAOMI Baires for 11/3/21.      [phone]

## 2021-11-02 ENCOUNTER — TELEPHONE (OUTPATIENT)
Dept: CARDIOLOGY CLINIC | Age: 61
End: 2021-11-02

## 2021-11-02 NOTE — PROGRESS NOTES
[facility]  ([phone]    HPI: Juma Gutierrez is a 57y.o. year-old who presents for follow up regarding her Atrial Fib and CAD. She requested a visit today for chest pain  The last couple of weeks she has been having exertional midsternal chest pain  Happens with walking, resolves at rest and episodes are associated with diaphoresis  No increase in dyspnea with exertion, no PND  No palpitations   Has GERD, recent saw GI about chest pain and had normal abd US, now on PPI BID but chest pain persists  Has varicose veins but not much LE edema  No dizziness or lightheadedness  On coumadin in 2014, Eliquis since then, has had some bleeding issues and saw Lew Cook 9/21 for watchman device consideration - per his note \"For now however patient wants to hold off until there are any other bleeding concerns. Continue oral anticoagulation for now. We will plan to see her back in 6 months for history of paroxysmal atrial fibrillation. Blood pressure appears to be pretty well controlled for now. Continue Eliquis and metoprolol for A. Fib. \"  Appears that patient has follow up with Dr. Siria Stewart later this month and then Dr. Lew Cook in March 2022 - Dr. Siria Stewart to discuss this with patient at next appointment    Labs dated 9/24/21  A1C 7.4%, BMP ok  Lipids 4/15/21  , , , HDL 57    Assessment/Plan:  1. Atrial fibrillation- initially occurred cesar-op knee surgery, then had recurrence s/p DCCV, doing well on Toprol XL 50mg daily  -CHADS2CVASC=3 (HTN, DM, sex), continue Eliquis 5mg BID for now, saw Dr. Lew Cook for consideration of watchman device but patient wants to defer for now     2.  Chest pain - no ischemia per nuclear stress test 6/18 but now patient is having exertional chest pain, no CAD by cardiac cath in 2000   -continue statin, advised patient to resume ASA 81mg daily, gave her Rx for NTG SL tabs PRN chest pain and instructed on use  -will order lexiscan nuclear stress test to assess for ischemia  -she will follow up with Dr. Yo Marvin later this month   3. S/p right knee replacement on 12/22/15 - followed by Dr. Marcial Miranda  4. DM Type 2 - A1c 7.4%, on insulin, management per PCP  5. HTN - controlled, continue Toprol XL and enalapril     6. Dyslipidemia - continue atorvastatin 40mg daily, last  in 4/21, may need to change statin at next visit depending on results of stress test or if patient is found to have CAD  7. Vision trouble - had laser surgery, followed by Dr. Reymundo Dickson glaucoma, high intraocular pressures  8. Asthma - on inhalers  9. Mitral regurgitation - mild by TTE, 2/6 BRAD  10. GERD - on PPI BID now   11. Fam hx of early CAD - proceed with stress testing as above     Echo 10/20 Ef 60% - mild MR  Nuc Stress test 6/18 - no ischemia Ef 69%  Echo 3/17 - LVEF 55-60%, no WMA, trivial MR  Loop Monitor 5/16 - no arrhythmias  Brennon Nuc Stress 1/16 - no ischemia, LVEF 60%  Echo 12/15 - LVEF 55-60%, no WMA  Cardiac Cath 11/2000 - no CAD, no plaque, apical HK, LVEF 67%    Fam Hx: brother with MI at age 37, mother had CABG (patient of Dr. Yo Marvin)  Soc Hx: no tobacco use    She  has a past medical history of Arthritis, Asthma, Atrial fibrillation ([address] Diabetes ([address] GERD (gastroesophageal reflux disease), Glaucoma, High cholesterol, Hypertension, Long term current use of anticoagulant therapy, Nausea & vomiting, and Psychiatric disorder. Cardiovascular ROS: positive for chest pain  Respiratory ROS: no cough or wheezing  Neurological ROS: no TIA or stroke symptoms  All other systems negative except as above. PE  Vitals:    11/03/21 1335   BP: 110/80   Pulse: 75   Resp: 16   SpO2: 99%   Weight: 176 lb (79.8 kg)   Height: 5' 3\" (1.6 m)    Body mass index is 31.18 kg/m².    General appearance - alert, well appearing, and in no distress  Mental status - affect appropriate to mood  Eyes - sclera anicteric, moist mucous membranes  Neck - supple  Lymphatics - not assessed  Chest - clear to auscultation, no wheezes, rales or rhonchi  Heart - normal rate, regular rhythm, normal S1, S2, 2/6 BRAD   Abdomen - soft, nontender, nondistended  Back exam - full range of motion, no tenderness  Neurological - cranial nerves II through XII grossly intact, no focal deficit  Musculoskeletal - no muscular tenderness noted, normal strength  Extremities - peripheral pulses normal, no pedal edema  Skin - normal coloration  no rashes    12 lead ECG: NSR    Recent Labs:  No results found for: CHOL, CHOLX, CHLST, CHOLV, 335399, HDL, HDLP, LDL, LDLC, DLDLP, Coco Reeks, [facility], [facility]  Lab Results   Component Value Date/Time    Creatinine 0.56 2015 05:12 AM     Lab Results   Component Value Date/Time    BUN 8 2015 05:12 AM     Lab Results   Component Value Date/Time    Potassium 4.4 2015 05:12 AM     Lab Results   Component Value Date/Time    Hemoglobin A1c 8.1 (H) 12/15/2015 03:36 PM     Lab Results   Component Value Date/Time    HGB 13.7 2020 10:30 AM     Lab Results   Component Value Date/Time    PLATELET 738  10:30 AM       Reviewed:  Past Medical History:   Diagnosis Date    Arthritis     Asthma     Atrial fibrillation (HCC)     Diabetes ([address]     GERD (gastroesophageal reflux disease)     Glaucoma     High cholesterol     Hypertension     Long term current use of anticoagulant therapy     Nausea & vomiting     Psychiatric disorder     DEPRESSION     Social History     Tobacco Use   Smoking Status Former Smoker    Packs/day: 0.25    Years: 10.00    Pack years: 2.50    Quit date: 12/15/2003    Years since quittin.8   Smokeless Tobacco Never Used     Social History     Substance and Sexual Activity   Alcohol Use No     Allergies   Allergen Reactions    Bactrim [Sulfamethoprim Ds] Rash and Nausea and Vomiting    Other Medication Rash     SURGICAL GLUE    Oxycodone Nausea and Vomiting       Current Outpatient Medications   Medication Sig    triamcinolone acetonide (KENALOG) 0.1 % topical cream APPLY TO AFFECTED AREA TWICE A DAY    dorzolamide-timoloL (COSOPT) 22.3-6.8 mg/mL ophthalmic solution     clotrimazole-betamethasone (LOTRISONE) topical cream     azelastine (ASTELIN) 137 mcg (0.1 %) nasal spray     brimonidine (ALPHAGAN) 0.15 % ophthalmic solution     aspirin delayed-release 81 mg tablet Take 1 Tablet by mouth daily.  nitroglycerin (NITROSTAT) 0.4 mg SL tablet 1 Tablet by SubLINGual route every five (5) minutes as needed for Chest Pain for up to 3 doses.  enalapril (VASOTEC) 10 mg tablet TAKE 1 TABLET BY MOUTH EVERY DAY    Eliquis 5 mg tablet TAKE 1 TABLET BY MOUTH TWICE A DAY    metoprolol succinate (TOPROL-XL) 50 mg XL tablet TAKE 1 TABLET BY MOUTH EVERY DAY    fluconazole (DIFLUCAN) 100 mg tablet Take 100 mg by mouth every seven (7) days. FDA advises cautious prescribing of oral fluconazole in pregnancy.  montelukast (SINGULAIR) 10 mg tablet Take 10 mg by mouth daily.  atorvastatin (LIPITOR) 40 mg tablet TAKE 1 TABLET BY MOUTH EVERY DAY AT NIGHT    dapagliflozin (FARXIGA) 10 mg tab tablet Take  by mouth daily.  acetaminophen (TYLENOL) 500 mg tablet Take 1 Tab by mouth every four (4) hours (while awake). (Patient taking differently: Take 500 mg by mouth every six (6) hours as needed.)    insulin aspart protamine/insulin aspart (NOVOLOG MIX 70-30 FLEXPEN) 100 unit/mL (70-30) flex pen by SubCUTAneous route three (3) times daily. PT TAKES ON SS, BUT SHE CANNOT TELL WHAT THE SCALE IS-STATES IT'S BASED ON WHAT SHE IS GOING TO EAT.  minocycline (MINOCIN, DYNACIN) 100 mg capsule Take  by mouth daily as needed.  pantoprazole (PROTONIX) 40 mg tablet Take 40 mg by mouth daily.  olopatadine (PATANOL) 0.1 % ophthalmic solution Administer 2 Drops to both eyes two (2) times a day.  peg 400-propylene glycol (SYSTANE GEL) 0.4-0.3 % drpg Apply  to eye two (2) times a day.     cycloSPORINE (RESTASIS) 0.05 % ophthalmic emulsion Administer 1 Drop to both eyes as needed.  fluticasone (FLONASE) 50 mcg/actuation nasal spray 2 Sprays by Both Nostrils route two (2) times a day. No current facility-administered medications for this visit.        Maty Puente NP  [facility] [address] [place] [address] [address], [address] [address]  [facility]  [phone]

## 2021-11-02 NOTE — TELEPHONE ENCOUNTER
Call placed to PCP, Dr Frankie Ashby, 2 pt identifiers used      Labs have been requested. Office note, recent labs to be faxed to [phone].

## 2021-11-02 NOTE — TELEPHONE ENCOUNTER
Patient returned the call, patient will be in the office 11/03/2021, she has an appointment.         VNZJH:[phone]

## 2021-11-02 NOTE — TELEPHONE ENCOUNTER
Future Appointments   Date Time Provider Pablito Alyssa   11/3/2021  1:40 PM Iker Vidal NP CAVREY BS AMB   11/24/2021  1:20 PM MD ORI Jimenes BS AMB   3/15/2022  3:00 PM MD ORI Sharp BS AMB

## 2021-11-03 ENCOUNTER — OFFICE VISIT (OUTPATIENT)
Dept: CARDIOLOGY CLINIC | Age: 61
End: 2021-11-03
Payer: MEDICARE

## 2021-11-03 VITALS
BODY MASS INDEX: 31.18 KG/M2 | HEIGHT: 63 IN | WEIGHT: 176 LBS | SYSTOLIC BLOOD PRESSURE: 110 MMHG | OXYGEN SATURATION: 99 % | DIASTOLIC BLOOD PRESSURE: 80 MMHG | RESPIRATION RATE: 16 BRPM | HEART RATE: 75 BPM

## 2021-11-03 DIAGNOSIS — I48.0 PAROXYSMAL ATRIAL FIBRILLATION (HCC): ICD-10-CM

## 2021-11-03 DIAGNOSIS — I10 ESSENTIAL HYPERTENSION: ICD-10-CM

## 2021-11-03 DIAGNOSIS — Z82.49 FAMILY HISTORY OF EARLY CAD: ICD-10-CM

## 2021-11-03 DIAGNOSIS — R07.9 CHEST PAIN, UNSPECIFIED TYPE: Primary | ICD-10-CM

## 2021-11-03 DIAGNOSIS — E78.5 DYSLIPIDEMIA: ICD-10-CM

## 2021-11-03 PROCEDURE — 93010 ELECTROCARDIOGRAM REPORT: CPT | Performed by: NURSE PRACTITIONER

## 2021-11-03 PROCEDURE — G8754 DIAS BP LESS 90: HCPCS | Performed by: NURSE PRACTITIONER

## 2021-11-03 PROCEDURE — 93005 ELECTROCARDIOGRAM TRACING: CPT | Performed by: NURSE PRACTITIONER

## 2021-11-03 PROCEDURE — G0463 HOSPITAL OUTPT CLINIC VISIT: HCPCS | Performed by: NURSE PRACTITIONER

## 2021-11-03 PROCEDURE — G8417 CALC BMI ABV UP PARAM F/U: HCPCS | Performed by: NURSE PRACTITIONER

## 2021-11-03 PROCEDURE — G8432 DEP SCR NOT DOC, RNG: HCPCS | Performed by: NURSE PRACTITIONER

## 2021-11-03 PROCEDURE — G8427 DOCREV CUR MEDS BY ELIG CLIN: HCPCS | Performed by: NURSE PRACTITIONER

## 2021-11-03 PROCEDURE — G8752 SYS BP LESS 140: HCPCS | Performed by: NURSE PRACTITIONER

## 2021-11-03 PROCEDURE — 3017F COLORECTAL CA SCREEN DOC REV: CPT | Performed by: NURSE PRACTITIONER

## 2021-11-03 PROCEDURE — 99214 OFFICE O/P EST MOD 30 MIN: CPT | Performed by: NURSE PRACTITIONER

## 2021-11-03 RX ORDER — TRIAMCINOLONE ACETONIDE 1 MG/G
CREAM TOPICAL
COMMUNITY
Start: 2021-10-02

## 2021-11-03 RX ORDER — ATENOLOL 25 MG/1
TABLET ORAL
COMMUNITY
End: 2021-11-03

## 2021-11-03 RX ORDER — AZELASTINE 1 MG/ML
SPRAY, METERED NASAL
COMMUNITY

## 2021-11-03 RX ORDER — CLOTRIMAZOLE AND BETAMETHASONE DIPROPIONATE 10; .64 MG/G; MG/G
CREAM TOPICAL
COMMUNITY
Start: 2021-10-20

## 2021-11-03 RX ORDER — ASPIRIN 81 MG/1
81 TABLET ORAL DAILY
Qty: 30 TABLET | Refills: 1 | Status: SHIPPED | OUTPATIENT
Start: 2021-11-03 | End: 2021-11-19

## 2021-11-03 RX ORDER — DORZOLAMIDE HYDROCHLORIDE AND TIMOLOL MALEATE 20; 5 MG/ML; MG/ML
SOLUTION/ DROPS OPHTHALMIC
COMMUNITY
Start: 2021-10-13

## 2021-11-03 RX ORDER — NITROGLYCERIN 0.4 MG/1
0.4 TABLET SUBLINGUAL
Qty: 25 EACH | Refills: 1 | Status: SHIPPED | OUTPATIENT
Start: 2021-11-03 | End: 2021-12-20

## 2021-11-03 RX ORDER — BRIMONIDINE TARTRATE 1.5 MG/ML
SOLUTION/ DROPS OPHTHALMIC
COMMUNITY
Start: 2021-10-28

## 2021-11-03 NOTE — PROGRESS NOTES
Shirin stress test instructions given (green sheet also): No caffeine products 24 hrs prior (ex: coffee, tea, soda, chocolate, migraine medications, etc.)  Nothing to eat or drink 4 hrs prior except medications with sips of water (unless advised to hold specific medication)  No tobacco products 6 hrs prior. Please call 24 hrs prior if appointment needs to be r/s'ed. Patient verbalized understanding and denied further questions or concerns.

## 2021-11-03 NOTE — PATIENT INSTRUCTIONS
You have been given a prescription for Nitroglycerin to take ONLY AS NEEDED for chest pain. You can take one tablet under your tongue for chest pain every 5 minutes up to a total of 3 tablets. If your chest pain is not gone after the 3rd tab, call 9-11 and go to the nearest emergency room. Please begin aspirin 81mg daily

## 2021-11-08 ENCOUNTER — ANCILLARY PROCEDURE (OUTPATIENT)
Dept: CARDIOLOGY CLINIC | Age: 61
End: 2021-11-08
Payer: MEDICARE

## 2021-11-08 VITALS
DIASTOLIC BLOOD PRESSURE: 72 MMHG | SYSTOLIC BLOOD PRESSURE: 120 MMHG | HEIGHT: 63 IN | BODY MASS INDEX: 31.18 KG/M2 | WEIGHT: 176 LBS

## 2021-11-08 DIAGNOSIS — I10 PRIMARY HYPERTENSION: Chronic | ICD-10-CM

## 2021-11-08 DIAGNOSIS — I48.0 PAROXYSMAL ATRIAL FIBRILLATION (HCC): ICD-10-CM

## 2021-11-08 DIAGNOSIS — I10 ESSENTIAL HYPERTENSION: ICD-10-CM

## 2021-11-08 DIAGNOSIS — I25.10 CORONARY ARTERY DISEASE INVOLVING NATIVE CORONARY ARTERY OF NATIVE HEART WITHOUT ANGINA PECTORIS: ICD-10-CM

## 2021-11-08 DIAGNOSIS — E78.5 DYSLIPIDEMIA: ICD-10-CM

## 2021-11-08 DIAGNOSIS — Z82.49 FAMILY HISTORY OF EARLY CAD: ICD-10-CM

## 2021-11-08 DIAGNOSIS — R07.9 CHEST PAIN, UNSPECIFIED TYPE: ICD-10-CM

## 2021-11-08 LAB
STRESS BASELINE DIAS BP: 72 MMHG
STRESS BASELINE HR: 73 BPM
STRESS BASELINE SYS BP: 120 MMHG
STRESS O2 SAT PEAK: 97 %
STRESS O2 SAT REST: 97 %
STRESS PEAK DIAS BP: 70 MMHG
STRESS PEAK SYS BP: 116 MMHG
STRESS PERCENT HR ACHIEVED: 68 %
STRESS POST PEAK HR: 107 BPM
STRESS RATE PRESSURE PRODUCT: NORMAL BPM*MMHG
STRESS TARGET HR: 158 BPM

## 2021-11-08 PROCEDURE — 93018 CV STRESS TEST I&R ONLY: CPT | Performed by: INTERNAL MEDICINE

## 2021-11-08 PROCEDURE — 93017 CV STRESS TEST TRACING ONLY: CPT | Performed by: INTERNAL MEDICINE

## 2021-11-08 PROCEDURE — A9500 TC99M SESTAMIBI: HCPCS

## 2021-11-08 PROCEDURE — 93016 CV STRESS TEST SUPVJ ONLY: CPT | Performed by: INTERNAL MEDICINE

## 2021-11-08 PROCEDURE — 78452 HT MUSCLE IMAGE SPECT MULT: CPT | Performed by: INTERNAL MEDICINE

## 2021-11-08 PROCEDURE — A9500 TC99M SESTAMIBI: HCPCS | Performed by: INTERNAL MEDICINE

## 2021-11-08 RX ORDER — TETRAKIS(2-METHOXYISOBUTYLISOCYANIDE)COPPER(I) TETRAFLUOROBORATE 1 MG/ML
10 INJECTION, POWDER, LYOPHILIZED, FOR SOLUTION INTRAVENOUS ONCE
Status: COMPLETED | OUTPATIENT
Start: 2021-11-08 | End: 2021-11-08

## 2021-11-08 RX ORDER — TETRAKIS(2-METHOXYISOBUTYLISOCYANIDE)COPPER(I) TETRAFLUOROBORATE 1 MG/ML
30 INJECTION, POWDER, LYOPHILIZED, FOR SOLUTION INTRAVENOUS ONCE
Status: COMPLETED | OUTPATIENT
Start: 2021-11-08 | End: 2021-11-08

## 2021-11-08 RX ADMIN — TECHNETIUM TC 99M SESTAMIBI 25.6 MILLICURIE: 1 INJECTION INTRAVENOUS at 14:20

## 2021-11-08 RX ADMIN — REGADENOSON 0.4 MG: 0.08 INJECTION, SOLUTION INTRAVENOUS at 14:20

## 2021-11-08 RX ADMIN — TETRAKIS(2-METHOXYISOBUTYLISOCYANIDE)COPPER(I) TETRAFLUOROBORATE 8.3 MILLICURIE: 1 INJECTION, POWDER, LYOPHILIZED, FOR SOLUTION INTRAVENOUS at 12:50

## 2021-11-09 ENCOUNTER — TELEPHONE (OUTPATIENT)
Dept: CARDIOLOGY CLINIC | Age: 61
End: 2021-11-09

## 2021-11-09 DIAGNOSIS — I25.10 CORONARY ARTERY DISEASE INVOLVING NATIVE CORONARY ARTERY OF NATIVE HEART WITHOUT ANGINA PECTORIS: Primary | ICD-10-CM

## 2021-11-09 DIAGNOSIS — R07.9 CHEST PAIN, UNSPECIFIED TYPE: ICD-10-CM

## 2021-11-09 NOTE — PROGRESS NOTES
Please let her know that her stress test is abnormal suggesting a possible blocked artery and I recommend that she have a cardiac cath with Dr. Ashley Lozada

## 2021-11-16 ENCOUNTER — HOSPITAL ENCOUNTER (OUTPATIENT)
Dept: PREADMISSION TESTING | Age: 61
Discharge: HOME | End: 2021-11-16
Attending: INTERNAL MEDICINE
Payer: MEDICARE

## 2021-11-16 ENCOUNTER — TRANSCRIBE ORDER (OUTPATIENT)
Dept: REGISTRATION | Age: 61
End: 2021-11-16

## 2021-11-16 DIAGNOSIS — Z01.812 PRE-PROCEDURE LAB EXAM: Primary | ICD-10-CM

## 2021-11-16 DIAGNOSIS — Z01.812 PRE-PROCEDURE LAB EXAM: ICD-10-CM

## 2021-11-16 PROCEDURE — U0005 INFEC AGEN DETEC AMPLI PROBE: HCPCS

## 2021-11-17 LAB
SARS-COV-2, XPLCVT: NOT DETECTED
SOURCE, COVRS: NORMAL

## 2021-11-18 RX ORDER — DIPHENHYDRAMINE HYDROCHLORIDE 50 MG/ML
50 INJECTION, SOLUTION INTRAMUSCULAR; INTRAVENOUS
Status: CANCELLED | OUTPATIENT
Start: 2021-11-18 | End: 2021-11-19

## 2021-11-18 RX ORDER — SODIUM CHLORIDE 9 MG/ML
75 INJECTION, SOLUTION INTRAVENOUS CONTINUOUS
Status: CANCELLED | OUTPATIENT
Start: 2021-11-18 | End: 2021-11-18

## 2021-11-18 RX ORDER — SODIUM CHLORIDE 0.9 % (FLUSH) 0.9 %
5-40 SYRINGE (ML) INJECTION EVERY 8 HOURS
Status: CANCELLED | OUTPATIENT
Start: 2021-11-18

## 2021-11-19 ENCOUNTER — HOSPITAL ENCOUNTER (OUTPATIENT)
Age: 61
Setting detail: OUTPATIENT SURGERY
Discharge: HOME | End: 2021-11-19
Attending: INTERNAL MEDICINE | Admitting: INTERNAL MEDICINE
Payer: MEDICARE

## 2021-11-19 VITALS
SYSTOLIC BLOOD PRESSURE: 128 MMHG | OXYGEN SATURATION: 96 % | TEMPERATURE: 97.7 F | DIASTOLIC BLOOD PRESSURE: 70 MMHG | HEIGHT: 62 IN | WEIGHT: 177 LBS | HEART RATE: 69 BPM | RESPIRATION RATE: 15 BRPM | BODY MASS INDEX: 32.57 KG/M2

## 2021-11-19 DIAGNOSIS — I20.0 UNSTABLE ANGINA (HCC): ICD-10-CM

## 2021-11-19 LAB
ACT BLD: 274 SECS (ref 79–138)
ACT BLD: 323 SECS (ref 79–138)
GLUCOSE BLD STRIP.AUTO-MCNC: 151 MG/DL (ref 65–117)
SERVICE CMNT-IMP: ABNORMAL

## 2021-11-19 PROCEDURE — C1769 GUIDE WIRE: HCPCS | Performed by: INTERNAL MEDICINE

## 2021-11-19 PROCEDURE — 99153 MOD SED SAME PHYS/QHP EA: CPT | Performed by: INTERNAL MEDICINE

## 2021-11-19 PROCEDURE — C1725 CATH, TRANSLUMIN NON-LASER: HCPCS | Performed by: INTERNAL MEDICINE

## 2021-11-19 PROCEDURE — 74011250636 HC RX REV CODE- 250/636: Performed by: INTERNAL MEDICINE

## 2021-11-19 PROCEDURE — 74011250637 HC RX REV CODE- 250/637: Performed by: INTERNAL MEDICINE

## 2021-11-19 PROCEDURE — 92929 HC PLC DE STNT +/-PTA MAJOR COR VESL/BRNCH  ADD LC: CPT | Performed by: INTERNAL MEDICINE

## 2021-11-19 PROCEDURE — 77030042317 HC BND COMPR HEMSTAT -B: Performed by: INTERNAL MEDICINE

## 2021-11-19 PROCEDURE — 85347 COAGULATION TIME ACTIVATED: CPT

## 2021-11-19 PROCEDURE — 92929 PR PRQ TRLUML CORONARY STENT W/ANGIO ADDL ART/BRNCH: CPT | Performed by: INTERNAL MEDICINE

## 2021-11-19 PROCEDURE — 77030012468 HC VLV BLEEDBK CNTRL ABBT -B: Performed by: INTERNAL MEDICINE

## 2021-11-19 PROCEDURE — 2709999900 HC NON-CHARGEABLE SUPPLY: Performed by: INTERNAL MEDICINE

## 2021-11-19 PROCEDURE — 93458 L HRT ARTERY/VENTRICLE ANGIO: CPT | Performed by: INTERNAL MEDICINE

## 2021-11-19 PROCEDURE — 93005 ELECTROCARDIOGRAM TRACING: CPT

## 2021-11-19 PROCEDURE — 92928 PRQ TCAT PLMT NTRAC ST 1 LES: CPT | Performed by: INTERNAL MEDICINE

## 2021-11-19 PROCEDURE — 82962 GLUCOSE BLOOD TEST: CPT

## 2021-11-19 PROCEDURE — 99152 MOD SED SAME PHYS/QHP 5/>YRS: CPT | Performed by: INTERNAL MEDICINE

## 2021-11-19 PROCEDURE — 77030013715 HC INFL SYS MRTM -B: Performed by: INTERNAL MEDICINE

## 2021-11-19 PROCEDURE — C1887 CATHETER, GUIDING: HCPCS | Performed by: INTERNAL MEDICINE

## 2021-11-19 PROCEDURE — 77030013744: Performed by: INTERNAL MEDICINE

## 2021-11-19 PROCEDURE — C1894 INTRO/SHEATH, NON-LASER: HCPCS | Performed by: INTERNAL MEDICINE

## 2021-11-19 PROCEDURE — C1874 STENT, COATED/COV W/DEL SYS: HCPCS | Performed by: INTERNAL MEDICINE

## 2021-11-19 PROCEDURE — 74011000636 HC RX REV CODE- 636: Performed by: INTERNAL MEDICINE

## 2021-11-19 PROCEDURE — 77030040934 HC CATH DIAG DXTERITY MEDT -A: Performed by: INTERNAL MEDICINE

## 2021-11-19 PROCEDURE — 74011000250 HC RX REV CODE- 250: Performed by: INTERNAL MEDICINE

## 2021-11-19 DEVICE — IMPLANTABLE DEVICE: Type: IMPLANTABLE DEVICE | Status: FUNCTIONAL

## 2021-11-19 DEVICE — XIENCE SIERRA™ EVEROLIMUS ELUTING CORONARY STENT SYSTEM 4.00 MM X 08 MM / RAPID-EXCHANGE
Type: IMPLANTABLE DEVICE | Status: FUNCTIONAL
Brand: XIENCE SIERRA™

## 2021-11-19 DEVICE — STENT RONYX35008UX RESOLUTE ONYX 3.50X08
Type: IMPLANTABLE DEVICE | Status: FUNCTIONAL
Brand: RESOLUTE ONYX™

## 2021-11-19 RX ORDER — ACETAMINOPHEN 325 MG/1
650 TABLET ORAL
Status: DISCONTINUED | OUTPATIENT
Start: 2021-11-19 | End: 2021-11-19 | Stop reason: HOSPADM

## 2021-11-19 RX ORDER — DIPHENHYDRAMINE HYDROCHLORIDE 50 MG/ML
50 INJECTION, SOLUTION INTRAMUSCULAR; INTRAVENOUS
Status: DISCONTINUED | OUTPATIENT
Start: 2021-11-19 | End: 2021-11-19 | Stop reason: HOSPADM

## 2021-11-19 RX ORDER — CLOPIDOGREL 300 MG/1
TABLET, FILM COATED ORAL AS NEEDED
Status: DISCONTINUED | OUTPATIENT
Start: 2021-11-19 | End: 2021-11-19 | Stop reason: HOSPADM

## 2021-11-19 RX ORDER — CLOPIDOGREL BISULFATE 75 MG/1
75 TABLET ORAL DAILY
Qty: 30 TABLET | Refills: 11 | Status: SHIPPED | OUTPATIENT
Start: 2021-11-19 | End: 2022-02-14 | Stop reason: SDUPTHER

## 2021-11-19 RX ORDER — HEPARIN SODIUM 1000 [USP'U]/ML
INJECTION, SOLUTION INTRAVENOUS; SUBCUTANEOUS AS NEEDED
Status: DISCONTINUED | OUTPATIENT
Start: 2021-11-19 | End: 2021-11-19 | Stop reason: HOSPADM

## 2021-11-19 RX ORDER — SODIUM CHLORIDE 9 MG/ML
75 INJECTION, SOLUTION INTRAVENOUS CONTINUOUS
Status: DISPENSED | OUTPATIENT
Start: 2021-11-19 | End: 2021-11-19

## 2021-11-19 RX ORDER — SODIUM CHLORIDE 0.9 % (FLUSH) 0.9 %
5-40 SYRINGE (ML) INJECTION EVERY 8 HOURS
Status: DISCONTINUED | OUTPATIENT
Start: 2021-11-19 | End: 2021-11-19 | Stop reason: HOSPADM

## 2021-11-19 RX ORDER — FENTANYL CITRATE 50 UG/ML
INJECTION, SOLUTION INTRAMUSCULAR; INTRAVENOUS AS NEEDED
Status: DISCONTINUED | OUTPATIENT
Start: 2021-11-19 | End: 2021-11-19 | Stop reason: HOSPADM

## 2021-11-19 RX ORDER — HEPARIN SODIUM 200 [USP'U]/100ML
INJECTION, SOLUTION INTRAVENOUS
Status: COMPLETED | OUTPATIENT
Start: 2021-11-19 | End: 2021-11-19

## 2021-11-19 RX ORDER — LIDOCAINE HYDROCHLORIDE 10 MG/ML
INJECTION INFILTRATION; PERINEURAL AS NEEDED
Status: DISCONTINUED | OUTPATIENT
Start: 2021-11-19 | End: 2021-11-19 | Stop reason: HOSPADM

## 2021-11-19 RX ORDER — MIDAZOLAM HYDROCHLORIDE 1 MG/ML
INJECTION, SOLUTION INTRAMUSCULAR; INTRAVENOUS AS NEEDED
Status: DISCONTINUED | OUTPATIENT
Start: 2021-11-19 | End: 2021-11-19 | Stop reason: HOSPADM

## 2021-11-19 RX ADMIN — SODIUM CHLORIDE 75 ML/HR: 9 INJECTION, SOLUTION INTRAVENOUS at 08:22

## 2021-11-19 NOTE — Clinical Note
TRANSFER - OUT REPORT:     Verbal report given to: (at bedside). Report consisted of patient's Situation, Background, Assessment and   Recommendations(SBAR). Opportunity for questions and clarification was provided. Patient transported with a Cardiac Cath Tech / Patient Care Tech. Patient transported to: recovery.

## 2021-11-19 NOTE — PROGRESS NOTES
Transfer to [address] [place] [address] from Procedure Area    Verbal report given to Darrion Riley on Cha Minors being transferred to Cardiac Cath Lab  for routine progression of care   Patient is post left heart cath with intervention procedure. Patient stable upon transfer to . Report consisted of patients Situation, Background, Assessment and   Recommendations(SBAR). Information from the following report(s) Procedure Summary, MAR and Recent Results was reviewed with the receiving nurse. Opportunity for questions and clarification was provided. Patient medicated during procedure with orders obtained and verified by Dr. De Madera. Refer to patient PROCEDURE REPORT for vital signs, assessment, status, and response during procedure.

## 2021-11-19 NOTE — PROGRESS NOTES
Cardiac Cath Lab Recovery Arrival Note:      Ramya Jacome arrived to Cardiac Cath Lab, Recovery Area. Staff introduced to patient. Patient identifiers verified with NAME and DATE OF BIRTH. Procedure verified with patient. Consent forms reviewed and signed by patient or authorized representative and verified. Allergies verified. Patient and family oriented to department. Patient and family informed of procedure and plan of care. Questions answered with review. Patient prepped for procedure, per orders from physician, prior to arrival.    Patient on cardiac monitor, non-invasive blood pressure, SPO2 monitor. On RA. Patient is A&Ox 4. Patient reports no complaints. Patient in stretcher, in low position, with side rails up, call bell within reach, patient instructed to call if assistance as needed. Patient prep in: [address], [place] [address].    Patient family has pager # [phone]     Family in: hospital.   Prep by: CC

## 2021-11-19 NOTE — DISCHARGE INSTRUCTIONS
Radial Cardiac Catheterization / Angiography Discharge Instructions    It is normal to feel tired the first couple days. Take it easy and follow the physicians instructions. CHECK THE CATHETER INSERTION SITE DAILY:  Remove the wrist dressing 24 hours after the procedure. You may shower 24 hours after the procedure. Wash with soap and water and pat dry. Gentle cleaning of the site with soap and water is sufficient, cover with a dry clean dressing or bandage. Do not apply creams or powders to the area. No soaking the wrist for 3 days. Leave the puncture site open to air after 24 hours post-procedure. CALL THE PHYSICIAN:  If the site becomes red, swollen or feels warm to the touch. If there is bleeding or drainage or if there is unusual pain at the radial site. If there is any minor oozing, you may apply a band-aid and remove after 12 hours. If the bleeding continues, hold pressure with the middle finger against the puncture site and the thumb against the back of the wrist, call 911 to be transported to the hospital.  DO NOT DRIVE YOURSELF, [facility] ACTIVITY:  For the first 24 hours do not manipulate the wrist.  No lifting, pushing or pulling over 3-5 pounds with the affected wrist for 7 days and no straining the insertion site. Do not lift grocery bags or the garbage can, do not run the vacuum  or  for 7 days. Start with short walks as in the hospital and gradually increase as tolerated each day. It is recommended to walk 30 minutes 5-7 days per week. Follow your physicians instructions on activity. Avoid walking outside in extremes of heat or cold. Walk inside when it is cold and windy or hot and humid. THINGS TO KEEP IN MIND:  No driving for at least 24 hours, or as designated by your physician. Limit the number of times you go up and down the stairs  Take rests and pace yourself with activity.   Be careful and do not strain with bowel movements. MEDICATIONS:  Take all medications as prescribed. Call your physician if you have any questions. Keep an updated list of your medications with you at all times and give a list to your physician and pharmacist.    SIGNS AND SYMPTOMS:  Be cautions of symptoms of angina or recurrent symptoms such as chest discomfort, unusual shortness of breath or fatigue. These could be symptoms of restenosis, a new blockage or a heart attack. If your symptoms are relieved with rest it is still recommended that you notify your physician of recurrent chest pain or discomfort. For CHEST PAIN or symptoms of angina not relieved with rest:  If the discomfort is not relieved with rest and you have been prescribed Nitroglycerin, take as directed (taken under the tongue, one at a time 5 minutes apart for a total of 3 doses). If the discomfort is not relieved after the 3rd nitroglycerin, call 911. AFTER CARE:  Follow up with you physician as instructed. Follow a heart healthy diet with proper portion control, daily stress management, daily      exercise, blood pressure and cholesterol control, and smoking cessation.

## 2021-11-19 NOTE — PROGRESS NOTES
TRANSFER - IN REPORT:    Verbal report received from Stuart on Sherry Barajas  being received from cath lab for routine progression of care. Report consisted of patients Situation, Background, Assessment and Recommendations(SBAR). Information from the following report(s) SBAR, Procedure Summary and MAR was reviewed with the receiving clinician. Opportunity for questions and clarification was provided. Assessment completed upon patients arrival to [address] [place] [address] care [facility]. Cardiac Cath Lab Recovery Arrival Note:    Sherry Barajas arrived to [facility] recovery area. Patient procedure= LHC/PCI. Patient on cardiac monitor, non-invasive blood pressure, SPO2 monitor. On room air . IV  of Normal saline on pump at 75 ml/hr. Patient status doing well without problems. Patient is A&Ox 4. Patient reports no pain. PROCEDURE SITE CHECK:    Procedure site:without any bleeding and no hematoma, no pain/discomfort reported at procedure site. No change in patient status. Continue to monitor patient and status. 1330- I have reviewed discharge instructions with the patient and spouse. The patient and spouse verbalized understanding.

## 2021-11-19 NOTE — PROGRESS NOTES
Cardiac Cath Lab Procedure Area Arrival Note:    Leonardo Hernandez arrived to Cardiac Cath Lab, Procedure Area. Patient identifiers verified with NAME and DATE OF BIRTH. Procedure verified with patient. Consent forms verified. Allergies verified. Patient informed of procedure and plan of care. Questions answered with review. Patient voiced understanding of procedure and plan of care. Patient on cardiac monitor, non-invasive blood pressure, SPO2 monitor. On O2 @ 2 lpm via nasal cannula. IV of normal saline on pump at 50 ml/hr. Patient status doing well without problems. Patient is A&Ox 4. Patient reports no pain. Patient medicated during procedure with orders obtained and verified by Dr. Kike Wynn. Refer to patients Cardiac Cath Lab PROCEDURE REPORT for vital signs, assessment, status, and response during procedure, printed at end of case. Printed report on chart or scanned into chart.

## 2021-11-20 LAB
ATRIAL RATE: 69 BPM
CALCULATED P AXIS, ECG09: 39 DEGREES
CALCULATED R AXIS, ECG10: 4 DEGREES
CALCULATED T AXIS, ECG11: 7 DEGREES
DIAGNOSIS, 93000: NORMAL
P-R INTERVAL, ECG05: 168 MS
Q-T INTERVAL, ECG07: 414 MS
QRS DURATION, ECG06: 90 MS
QTC CALCULATION (BEZET), ECG08: 443 MS
VENTRICULAR RATE, ECG03: 69 BPM

## 2021-11-22 NOTE — PROCEDURES
Findings  1. Severe native two-vessel disease  2. Proximal to ostial as well as mid LAD 90% stenosis. Lesions treated with 4 x 8 Xience and 3.5 x 8 mm resolute Rambo  drug-eluting stents respectively. 3.  Severe mid circumflex 80 to 90% stenosis treated with placement of 4.5 x 12 mm resolute Rambo drug-eluting stent  3. Mild disease in RCA    Access right radial no issues  Contrast 80 cc    Recommendations  1. Plavix in addition to oral anticoagulation. Discontinue aspirin  2. Continue current medical therapy for CAD. 3.  If patient develops bleeding issues, watchman can be considered to reduce the need for long-term oral anticoagulation. Alternatively short-term Plavix can be considered if Eliquis is continued long-term.

## 2021-11-24 ENCOUNTER — OFFICE VISIT (OUTPATIENT)
Dept: CARDIOLOGY CLINIC | Age: 61
End: 2021-11-24
Payer: MEDICARE

## 2021-11-24 VITALS
SYSTOLIC BLOOD PRESSURE: 100 MMHG | HEIGHT: 63 IN | BODY MASS INDEX: 31.01 KG/M2 | WEIGHT: 175 LBS | DIASTOLIC BLOOD PRESSURE: 60 MMHG | OXYGEN SATURATION: 98 % | RESPIRATION RATE: 16 BRPM | HEART RATE: 72 BPM

## 2021-11-24 DIAGNOSIS — E11.8 DM TYPE 2, CONTROLLED, WITH COMPLICATION (HCC): ICD-10-CM

## 2021-11-24 DIAGNOSIS — E78.5 DYSLIPIDEMIA: ICD-10-CM

## 2021-11-24 DIAGNOSIS — I25.10 CORONARY ARTERY DISEASE INVOLVING NATIVE CORONARY ARTERY OF NATIVE HEART WITHOUT ANGINA PECTORIS: Primary | ICD-10-CM

## 2021-11-24 DIAGNOSIS — I48.0 PAROXYSMAL ATRIAL FIBRILLATION (HCC): ICD-10-CM

## 2021-11-24 DIAGNOSIS — I10 ESSENTIAL HYPERTENSION: ICD-10-CM

## 2021-11-24 PROCEDURE — G8754 DIAS BP LESS 90: HCPCS | Performed by: INTERNAL MEDICINE

## 2021-11-24 PROCEDURE — 3017F COLORECTAL CA SCREEN DOC REV: CPT | Performed by: INTERNAL MEDICINE

## 2021-11-24 PROCEDURE — G8427 DOCREV CUR MEDS BY ELIG CLIN: HCPCS | Performed by: INTERNAL MEDICINE

## 2021-11-24 PROCEDURE — G8510 SCR DEP NEG, NO PLAN REQD: HCPCS | Performed by: INTERNAL MEDICINE

## 2021-11-24 PROCEDURE — 93010 ELECTROCARDIOGRAM REPORT: CPT | Performed by: INTERNAL MEDICINE

## 2021-11-24 PROCEDURE — G0463 HOSPITAL OUTPT CLINIC VISIT: HCPCS | Performed by: INTERNAL MEDICINE

## 2021-11-24 PROCEDURE — G8417 CALC BMI ABV UP PARAM F/U: HCPCS | Performed by: INTERNAL MEDICINE

## 2021-11-24 PROCEDURE — 93005 ELECTROCARDIOGRAM TRACING: CPT | Performed by: INTERNAL MEDICINE

## 2021-11-24 PROCEDURE — 3046F HEMOGLOBIN A1C LEVEL >9.0%: CPT | Performed by: INTERNAL MEDICINE

## 2021-11-24 PROCEDURE — 2022F DILAT RTA XM EVC RTNOPTHY: CPT | Performed by: INTERNAL MEDICINE

## 2021-11-24 PROCEDURE — 99214 OFFICE O/P EST MOD 30 MIN: CPT | Performed by: INTERNAL MEDICINE

## 2021-11-24 PROCEDURE — G8752 SYS BP LESS 140: HCPCS | Performed by: INTERNAL MEDICINE

## 2021-11-24 RX ORDER — AMOXICILLIN 500 MG/1
CAPSULE ORAL
COMMUNITY
Start: 2021-11-22

## 2021-11-24 RX ORDER — FLUTICASONE FUROATE AND VILANTEROL TRIFENATATE 200; 25 UG/1; UG/1
POWDER RESPIRATORY (INHALATION)
COMMUNITY
Start: 2021-10-30

## 2021-11-24 RX ORDER — CLINDAMYCIN PHOSPHATE 10 MG/G
GEL TOPICAL
COMMUNITY
Start: 2021-11-15

## 2021-11-24 NOTE — LETTER
Patient:  Ada Cervantes   YOB: 1960  Date of Visit: 11/24/2021      Dear Kristian Negrete MD  [address] [place] [id number]  Via Fax: [phone]:      Ms. Maribell Lazar is a 61 yo F seen in the past by Dr. Aliya Fong and Van Diggs, coronary artery disease with recent cardiac catheterization with Dr. Tammi Londono on 11/19/2021 demonstrating ostial/proximal LAD, mid LAD and significant mid circumflex lesions treated with two drug eluting stents to the LAD and one drug eluting stent to the circumflex. Since her procedure, she has been feeling much better. She no longer has any exertional chest pain. Her breathing is improved. With regard to her atrial fibrillation, this has been well controlled. She has been compliant with her medications. She is compensated on exam with clear lungs and no lower extremity edema. Assessment and Plan:   1. Coronary artery disease. Stable and compensated. Stressed the importance of Plavix for the next year. She is also on Toprol and ACE inhibitor, Enalapril. She is on 40 mg of Lipitor. She is not on aspirin, as she is also on Eliquis. After one year, she will be able to stop the Plavix and would switch this to aspirin eventually. Dr. Tammi Londono had mentioned if she does develop bleeding issues, WATCHMAN could always be considered in the future. 2. Atrial fibrillation. This occurred preop and has had cardioversion in the past.  She is doing well on Toprol. 3. Anticoagulation. For now, would have her continue Eliquis. There have been some discussions in the past about WATCHMAN, but the patient wanted to defer. 4. Status post right knee replacement in 2015. Followed by Dr. Javid Perez. 5. Type 2 diabetes. 6. Essential hypertension. Blood pressure is controlled. 7. Dyslipidemia. Tolerating statin. 8. Vision abnormality. Followed by ophthalmology, Dr. Lavern Boswell. Noted in the past to have glaucoma and high intraocular pressures.    9. Gastroesophageal reflux disease. If you have questions, please do not hesitate to call me.      Sincerely,      Shelby Mckeon MD

## 2021-11-24 NOTE — PROGRESS NOTES
[facility] [address]: [place]ynum  [phone]    History of Present Illness:  Ms. Fina Tuttle is a 59 yo F seen in the past by Dr. Orlando Mina and Lisa Sanchez, coronary artery disease with recent cardiac catheterization with Dr. Michael Bishop on 11/19/2021 demonstrating ostial/proximal LAD, mid LAD and significant mid circumflex lesions treated with two drug eluting stents to the LAD and one drug eluting stent to the circumflex. Since her procedure, she has been feeling much better. She no longer has any exertional chest pain. Her breathing is improved. With regard to her atrial fibrillation, this has been well controlled. She has been compliant with her medications. She is compensated on exam with clear lungs and no lower extremity edema. Assessment and Plan:   1. Coronary artery disease. Stable and compensated. Stressed the importance of Plavix for the next year. She is also on Toprol and ACE inhibitor, Enalapril. She is on 40 mg of Lipitor. She is not on aspirin, as she is also on Eliquis. After one year, she will be able to stop the Plavix and would switch this to aspirin eventually. Dr. Michael Bishop had mentioned if she does develop bleeding issues, WATCHMAN could always be considered in the future. 2. Atrial fibrillation. This occurred preop and has had cardioversion in the past.  She is doing well on Toprol. 3. Anticoagulation. For now, would have her continue Eliquis. There have been some discussions in the past about WATCHMAN, but the patient wanted to defer. 4. Status post right knee replacement in 2015. Followed by Dr. Jin Bunn. 5. Type 2 diabetes. 6. Essential hypertension. Blood pressure is controlled. 7. Dyslipidemia. Tolerating statin. 8. Vision abnormality. Followed by ophthalmology, Dr. Juaquin Little. Noted in the past to have glaucoma and high intraocular pressures. 9. Gastroesophageal reflux disease.         Echo 10/20 Ef 60% - mild MR  Nuc Stress test 6/18 - no ischemia Ef 69%  Echo 3/17 - LVEF 55-60%, no WMA, trivial MR  Loop Monitor 5/16 - no arrhythmias  Brennon Nuc Stress 1/16 - no ischemia, LVEF 60%  Echo 12/15 - LVEF 55-60%, no WMA  Cardiac Cath 11/2000 - no CAD, no plaque, apical HK, LVEF 67%    Fam Hx: brother with MI at age 37, mother had CABG (patient of Dr. Alek Tripp)  Soc Hx: no tobacco use      She  has a past medical history of Arthritis, Asthma, Atrial fibrillation ([address] Diabetes ([address] GERD (gastroesophageal reflux disease), Glaucoma, High cholesterol, Hypertension, Long term current use of anticoagulant therapy, Nausea & vomiting, and Psychiatric disorder. All other systems negative except as above. PE  Vitals:    11/24/21 1300   BP: 100/60   Pulse: 72   Resp: 16   SpO2: 98%   Weight: 175 lb (79.4 kg)   Height: 5' 3\" (1.6 m)    Body mass index is 31 kg/m².    General appearance - alert, well appearing, and in no distress  Mental status - affect appropriate to mood  Eyes - sclera anicteric, moist mucous membranes  Neck - supple, no JVD  Chest - clear to auscultation, no wheezes, rales or rhonchi  Heart - normal rate, regular rhythm, normal S1, S2, no murmurs, rubs, clicks or gallops  Abdomen - soft, nontender, nondistended, no masses or organomegaly  Neurological - no focal deficit  Extremities - peripheral pulses normal, no pedal edema      Recent Labs:  No results found for: CHOL, CHOLX, CHLST, CHOLV, 915782, HDL, HDLP, LDL, LDLC, DLDLP, TGLX, TRIGL, TRIGP, CHHD, CHHDX  Lab Results   Component Value Date/Time    Creatinine (POC) 0.50 (L) 07/13/2021 03:02 PM    Creatinine 0.64 11/11/2021 11:28 AM     Lab Results   Component Value Date/Time    BUN 15 11/11/2021 11:28 AM     Lab Results   Component Value Date/Time    Potassium 4.2 11/11/2021 11:28 AM     Lab Results   Component Value Date/Time    Hemoglobin A1c 8.1 (H) 12/15/2015 03:36 PM     Lab Results   Component Value Date/Time    HGB 14.2 11/11/2021 11:28 AM     Lab Results   Component Value Date/Time    PLATELET 158 65/85/2652 11:28 AM Reviewed:  Past Medical History:   Diagnosis Date    Arthritis     Asthma     Atrial fibrillation (HCC)     Diabetes ([address]     GERD (gastroesophageal reflux disease)     Glaucoma     High cholesterol     Hypertension     Long term current use of anticoagulant therapy     Nausea & vomiting     Psychiatric disorder     DEPRESSION     Social History     Tobacco Use   Smoking Status Former Smoker    Packs/day: 0.25    Years: 10.00    Pack years: 2.50    Quit date: 12/15/2003    Years since quittin.9   Smokeless Tobacco Never Used     Social History     Substance and Sexual Activity   Alcohol Use No     Allergies   Allergen Reactions    Bactrim [Sulfamethoprim Ds] Rash and Nausea and Vomiting    Other Medication Rash     SURGICAL GLUE    Oxycodone Nausea and Vomiting       Current Outpatient Medications   Medication Sig    Breo Ellipta 200-25 mcg/dose inhaler     clindamycin (CLINDAGEL) 1 % topical gel     amoxicillin (AMOXIL) 500 mg capsule     clopidogreL (Plavix) 75 mg tab Take 1 Tablet by mouth daily for 360 days. Indications: a sudden worsening of angina called acute coronary syndrome    triamcinolone acetonide (KENALOG) 0.1 % topical cream APPLY TO AFFECTED AREA TWICE A DAY    dorzolamide-timoloL (COSOPT) 22.3-6.8 mg/mL ophthalmic solution     clotrimazole-betamethasone (LOTRISONE) topical cream     brimonidine (ALPHAGAN) 0.15 % ophthalmic solution     nitroglycerin (NITROSTAT) 0.4 mg SL tablet 1 Tablet by SubLINGual route every five (5) minutes as needed for Chest Pain for up to 3 doses.  enalapril (VASOTEC) 10 mg tablet TAKE 1 TABLET BY MOUTH EVERY DAY    Eliquis 5 mg tablet TAKE 1 TABLET BY MOUTH TWICE A DAY    metoprolol succinate (TOPROL-XL) 50 mg XL tablet TAKE 1 TABLET BY MOUTH EVERY DAY    montelukast (SINGULAIR) 10 mg tablet Take 10 mg by mouth daily.     atorvastatin (LIPITOR) 40 mg tablet TAKE 1 TABLET BY MOUTH EVERY DAY AT NIGHT    dapagliflozin (FARXIGA) 10 mg tab tablet Take  by mouth daily.  acetaminophen (TYLENOL) 500 mg tablet Take 1 Tab by mouth every four (4) hours (while awake). (Patient taking differently: Take 500 mg by mouth every six (6) hours as needed.)    insulin aspart protamine/insulin aspart (NOVOLOG MIX 70-30 FLEXPEN) 100 unit/mL (70-30) flex pen by SubCUTAneous route three (3) times daily. PT TAKES ON SS, BUT SHE CANNOT TELL WHAT THE SCALE IS-STATES IT'S BASED ON WHAT SHE IS GOING TO EAT.  minocycline (MINOCIN, DYNACIN) 100 mg capsule Take  by mouth daily as needed.  pantoprazole (PROTONIX) 40 mg tablet Take 40 mg by mouth daily.  olopatadine (PATANOL) 0.1 % ophthalmic solution Administer 2 Drops to both eyes two (2) times a day.  peg 400-propylene glycol (SYSTANE GEL) 0.4-0.3 % drpg Apply  to eye two (2) times a day.  cycloSPORINE (RESTASIS) 0.05 % ophthalmic emulsion Administer 1 Drop to both eyes as needed.  fluticasone (FLONASE) 50 mcg/actuation nasal spray 2 Sprays by Both Nostrils route two (2) times a day.  azelastine (ASTELIN) 137 mcg (0.1 %) nasal spray  (Patient not taking: Reported on 11/19/2021)    fluconazole (DIFLUCAN) 100 mg tablet Take 100 mg by mouth every seven (7) days. FDA advises cautious prescribing of oral fluconazole in pregnancy. (Patient not taking: Reported on 11/24/2021)     No current facility-administered medications for this visit.        Patricia Carson MD  [facility] and [facility] [place]  [address] [facility] [address]  [address]

## 2021-12-20 RX ORDER — NITROGLYCERIN 0.4 MG/1
0.4 TABLET SUBLINGUAL
Qty: 25 TABLET | Refills: 1 | Status: SHIPPED | OUTPATIENT
Start: 2021-12-20 | End: 2022-02-20

## 2022-02-14 RX ORDER — CLOPIDOGREL BISULFATE 75 MG/1
75 TABLET ORAL DAILY
Qty: 30 TABLET | Refills: 11 | Status: SHIPPED | OUTPATIENT
Start: 2022-02-14 | End: 2022-05-13 | Stop reason: SDUPTHER

## 2022-02-14 NOTE — TELEPHONE ENCOUNTER
Patient requesting a refill on clopidogrel 75mg, patient stated the pharmacy is refusing her refill due to her being on eliquis and plavix, patient only has one pill left for today        [phone]

## 2022-02-14 NOTE — TELEPHONE ENCOUNTER
Requested Prescriptions     Signed Prescriptions Disp Refills    clopidogreL (Plavix) 75 mg tab 30 Tablet 11     Sig: Take 1 Tablet by mouth daily for 360 days.  Indications: a sudden worsening of angina called acute coronary syndrome     Authorizing Provider: Emilio Pang     Ordering User: Belia Gonsales       Last office visit 11/24/2021    Per Dr. Brittny Hewitt   Date Time Provider Pablito Shah   2/25/2022  1:20 PM MD ORI Oliver AMB   3/15/2022  3:00 PM MD ORI Paula BS AMB

## 2022-02-20 RX ORDER — NITROGLYCERIN 0.4 MG/1
TABLET SUBLINGUAL
Qty: 25 TABLET | Refills: 1 | Status: SHIPPED | OUTPATIENT
Start: 2022-02-20

## 2022-02-25 ENCOUNTER — OFFICE VISIT (OUTPATIENT)
Dept: CARDIOLOGY CLINIC | Age: 62
End: 2022-02-25
Payer: MEDICARE

## 2022-02-25 VITALS
OXYGEN SATURATION: 98 % | SYSTOLIC BLOOD PRESSURE: 122 MMHG | DIASTOLIC BLOOD PRESSURE: 78 MMHG | BODY MASS INDEX: 30.48 KG/M2 | HEART RATE: 81 BPM | WEIGHT: 172 LBS | RESPIRATION RATE: 16 BRPM | HEIGHT: 63 IN

## 2022-02-25 DIAGNOSIS — I10 ESSENTIAL HYPERTENSION: ICD-10-CM

## 2022-02-25 DIAGNOSIS — Z01.810 PREOP CARDIOVASCULAR EXAM: ICD-10-CM

## 2022-02-25 DIAGNOSIS — E78.5 DYSLIPIDEMIA: ICD-10-CM

## 2022-02-25 DIAGNOSIS — I25.110 CORONARY ARTERY DISEASE INVOLVING NATIVE CORONARY ARTERY OF NATIVE HEART WITH UNSTABLE ANGINA PECTORIS (HCC): Primary | ICD-10-CM

## 2022-02-25 DIAGNOSIS — E11.8 DM TYPE 2, CONTROLLED, WITH COMPLICATION (HCC): ICD-10-CM

## 2022-02-25 DIAGNOSIS — I48.0 PAROXYSMAL ATRIAL FIBRILLATION (HCC): ICD-10-CM

## 2022-02-25 PROCEDURE — G0463 HOSPITAL OUTPT CLINIC VISIT: HCPCS | Performed by: INTERNAL MEDICINE

## 2022-02-25 PROCEDURE — 99214 OFFICE O/P EST MOD 30 MIN: CPT | Performed by: INTERNAL MEDICINE

## 2022-02-25 RX ORDER — DICLOFENAC SODIUM 10 MG/G
GEL TOPICAL
COMMUNITY
Start: 2022-01-12

## 2022-02-25 RX ORDER — MELOXICAM 15 MG/1
TABLET ORAL
COMMUNITY
Start: 2022-01-12

## 2022-02-25 NOTE — PATIENT INSTRUCTIONS
You will be scheduled for a Nuclear Stress Test after your appointment today. Nuclear stress testing evaluates blood flow to your heart muscle and assesses cardiac function. There are 2 parts (Rest/Stress) to this procedure and will include either an exercise on a treadmill or an IV administration of a stressing medication called Lexiscan. Your cardiologist will determine which type of testing is best for you. This test can be performed in one day unless it is determined that better quality images will be obtained by performing the test over two days. *Please arrive 15 minutes prior to your appointment time    Test Duration:    -One day testing will take 4 hours   -Two day testing will take 2 hours each day. Your second day(resting images) will be scheduled after the first day is completed    Day of testing instructions:    1. NO CAFFEINE (not even decaffeinated products) 24 HOURS PRIOR TO TESTING. This includes coffee, soda, tea, chocolate, multivitamins, and migraine medication, like Excedrin or Fioricet that contains caffeine. 2. Nothing to eat or drink 4 HOURS prior to testing  3. NO NICOTINE 12 hours prior to testing  4. Hold any medications requested by your cardiologist. Otherwise take medications as directed with a few sips of water. If you are unsure you may bring your medications with you to take after instructed by your stressing nurse. It is recommended you hold toprol prior to your test. DIABETIC PATIENTS: Take half of your insulin with a light meal 4 hours before your test.  5. Wear comfortable clothes and shoes (Shirts with no metal, shorts or pants, tennis shoes, no heels or flip flops)    IMPORTANT: This testing involves a cardiac tracer ordered specifically for you. If you are unable to make your appointment, please call to cancel/reschedule AT LEAST 24 hours prior to your appointment so your tracer can be cancelled. [phone].

## 2022-02-25 NOTE — PROGRESS NOTES
[facility] [address]: Bynum  [phone] [id number]    History of Present Illness:  Ms. Juliann Nielsen is a 59 yo F seen in the past by Dr. Javier Galloway and Arianna Mcgee, coronary artery disease with recent cardiac catheterization with Dr. Tammi Temple on 11/19/2021 demonstrating ostial/proximal LAD, mid LAD and significant mid circumflex lesions treated with two drug eluting stents to the LAD and one drug eluting stent to the circumflex. Since her last visit, she has noted episodes where she will get non exertional chest discomfort a few times a month that can be substernal lasting two to three minutes at a time. This last occurred a few nights ago. Overall, her breathing has been okay. She is interested in having carpal tunnel surgery on her right hand at some point in the near future, however, she saw her orthopedic, Dr. Nessa Hamlin and they said she needed to wait until next November given her Plavix. I do think she can hold her Plavix in May, as that would be greater than six months, however, if she did stop her Plavix she would need to stay on 81 mg of aspirin. She is compensated on exam with clear lungs and no lower extremity edema. Assessment and Plan:   1. Coronary artery disease. Stable and compensated. We will continue her Plavix. She is not on aspirin as she is on Eliquis. She is on a statin, but her LDL recently was elevated. She is going to try lifestyle changes before making any adjustment. Of note in the past, there have been some discussions about WATCHMAN, but this was deferred. 2. Atrial fibrillation, well controlled. She did have cardioversion in the past.    3. Preop cardiac evaluation. She is stable cardiac wise and she can proceed with carpal tunnel surgery. With regard to blood thinners, in May she will be greater than six months post stent and she can hold her Plavix for this surgery. However if she is off the Plavix, she would need to be on 81 mg of aspirin. Eliquis could be held 48 hours prior.   Will send a report to Dr. Petrona Nicole and her primary care physician. 4. Status post knee replacement in 2015.   5. Essential hypertension. Blood pressure is controlled. 6. Type 2 diabetes. 7. Dyslipidemia. 8. Gastroesophageal reflux disease. Echo 10/20 Ef 60% - mild MR  Nuc Stress test 6/18 - no ischemia Ef 69%  Echo 3/17 - LVEF 55-60%, no WMA, trivial MR  Loop Monitor 5/16 - no arrhythmias  Brennon Nuc Stress 1/16 - no ischemia, LVEF 60%  Echo 12/15 - LVEF 55-60%, no WMA  Cardiac Cath 11/2000 - no CAD, no plaque, apical HK, LVEF 67%    Fam Hx: brother with MI at age 37, mother had CABG (patient of Dr. Dinorah Lehman)  Soc Hx: no tobacco use      She  has a past medical history of Arthritis, Asthma, Atrial fibrillation ([address] Diabetes ([address] GERD (gastroesophageal reflux disease), Glaucoma, High cholesterol, Hypertension, Long term current use of anticoagulant therapy, Nausea & vomiting, and Psychiatric disorder. All other systems negative except as above. PE  Vitals:    02/25/22 1302   BP: 122/78   Pulse: 81   Resp: 16   SpO2: 98%   Weight: 172 lb (78 kg)   Height: 5' 3\" (1.6 m)    Body mass index is 30.47 kg/m².    General appearance - alert, well appearing, and in no distress  Mental status - affect appropriate to mood  Eyes - sclera anicteric, moist mucous membranes  Neck - supple, no JVD  Chest - clear to auscultation, no wheezes, rales or rhonchi  Heart - normal rate, regular rhythm, normal S1, S2, no murmurs, rubs, clicks or gallops  Abdomen - soft, nontender, nondistended, no masses or organomegaly  Neurological - no focal deficit  Extremities - peripheral pulses normal, no pedal edema      Recent Labs:  No results found for: CHOL, CHOLX, CHLST, CHOLV, 857060, HDL, HDLP, LDL, LDLC, DLDLP, TGLX, TRIGL, TRIGP, CHHD, CHHDX  Lab Results   Component Value Date/Time    Creatinine (POC) 0.50 (L) 07/13/2021 03:02 PM    Creatinine 0.64 11/11/2021 11:28 AM     Lab Results   Component Value Date/Time    BUN 15 2021 11:28 AM     Lab Results   Component Value Date/Time    Potassium 4.2 2021 11:28 AM     Lab Results   Component Value Date/Time    Hemoglobin A1c 8.1 (H) 12/15/2015 03:36 PM     Lab Results   Component Value Date/Time    HGB 14.2 2021 11:28 AM     Lab Results   Component Value Date/Time    PLATELET 416  11:28 AM       Reviewed:  Past Medical History:   Diagnosis Date    Arthritis     Asthma     Atrial fibrillation (HCC)     [facility]     GERD (gastroesophageal reflux disease)     Glaucoma     High cholesterol     Hypertension     Long term current use of anticoagulant therapy     Nausea & vomiting     Psychiatric disorder     DEPRESSION     Social History     Tobacco Use   Smoking Status Former Smoker    Packs/day: 0.25    Years: 10.00    Pack years: 2.50    Quit date: 12/15/2003    Years since quittin.2   Smokeless Tobacco Never Used     Social History     Substance and Sexual Activity   Alcohol Use No     Allergies   Allergen Reactions    Bactrim [Sulfamethoprim Ds] Rash and Nausea and Vomiting    Other Medication Rash     SURGICAL GLUE    Oxycodone Nausea and Vomiting       Current Outpatient Medications   Medication Sig    diclofenac (VOLTAREN) 1 % gel APPLY 4 GRAMS TP AFFECTED AREAS ON FEET 4 TIMES A DAY    meloxicam (MOBIC) 15 mg tablet TAKE 1 TABLET BY MOUTH EVERY DAY WITH FOOD    nitroglycerin (NITROSTAT) 0.4 mg SL tablet DISSOLVE 1 TABLET UNDER THE TONGUE EVERY 5 MINUTES AS NEEDED FOR CHEST PAIN FOR UP TO 3 DOSES.  clopidogreL (Plavix) 75 mg tab Take 1 Tablet by mouth daily for 360 days.  Indications: a sudden worsening of angina called acute coronary syndrome    Breo Ellipta 200-25 mcg/dose inhaler     amoxicillin (AMOXIL) 500 mg capsule     triamcinolone acetonide (KENALOG) 0.1 % topical cream APPLY TO AFFECTED AREA TWICE A DAY    dorzolamide-timoloL (COSOPT) 22.3-6.8 mg/mL ophthalmic solution     clotrimazole-betamethasone (LOTRISONE) topical cream     azelastine (ASTELIN) 137 mcg (0.1 %) nasal spray     brimonidine (ALPHAGAN) 0.15 % ophthalmic solution     enalapril (VASOTEC) 10 mg tablet TAKE 1 TABLET BY MOUTH EVERY DAY    Eliquis 5 mg tablet TAKE 1 TABLET BY MOUTH TWICE A DAY    metoprolol succinate (TOPROL-XL) 50 mg XL tablet TAKE 1 TABLET BY MOUTH EVERY DAY    fluconazole (DIFLUCAN) 100 mg tablet Take 100 mg by mouth every seven (7) days. FDA advises cautious prescribing of oral fluconazole in pregnancy.  montelukast (SINGULAIR) 10 mg tablet Take 10 mg by mouth daily.  atorvastatin (LIPITOR) 40 mg tablet TAKE 1 TABLET BY MOUTH EVERY DAY AT NIGHT    dapagliflozin (FARXIGA) 10 mg tab tablet Take  by mouth daily.  acetaminophen (TYLENOL) 500 mg tablet Take 1 Tab by mouth every four (4) hours (while awake). (Patient taking differently: Take 500 mg by mouth every six (6) hours as needed.)    insulin aspart protamine/insulin aspart (NOVOLOG MIX 70-30 FLEXPEN) 100 unit/mL (70-30) flex pen by SubCUTAneous route three (3) times daily. PT TAKES ON SS, BUT SHE CANNOT TELL WHAT THE SCALE IS-STATES IT'S BASED ON WHAT SHE IS GOING TO EAT.  minocycline (MINOCIN, DYNACIN) 100 mg capsule Take  by mouth daily as needed.  pantoprazole (PROTONIX) 40 mg tablet Take 40 mg by mouth daily.  olopatadine (PATANOL) 0.1 % ophthalmic solution Administer 2 Drops to both eyes two (2) times a day.  peg 400-propylene glycol (SYSTANE GEL) 0.4-0.3 % drpg Apply  to eye two (2) times a day.  cycloSPORINE (RESTASIS) 0.05 % ophthalmic emulsion Administer 1 Drop to both eyes as needed.  fluticasone (FLONASE) 50 mcg/actuation nasal spray 2 Sprays by Both Nostrils route two (2) times a day.  clindamycin (CLINDAGEL) 1 % topical gel  (Patient not taking: Reported on 2/25/2022)     No current facility-administered medications for this visit.        Zandra Sanchez MD  [facility] heart and Vascular [place]  [address], [address] [facility], [address] [place] [address]

## 2022-02-25 NOTE — LETTER
Patient:  Susanna Trinidad   YOB: 1960  Date of Visit: 2/25/2022    Dear Gus David MD  [address] [address]  [address] Qing Tyson MD  [address] [place] [id number]  Via Fax: [phone]:     Ms. Jacqui Lamar is a 59 yo F seen in the past by Dr. Ulysses Robson and Elisabeth Galvan, coronary artery disease with recent cardiac catheterization with Dr. Isidro Ruiz on 11/19/2021 demonstrating ostial/proximal LAD, mid LAD and significant mid circumflex lesions treated with two drug eluting stents to the LAD and one drug eluting stent to the circumflex. Since her last visit, she has noted episodes where she will get non exertional chest discomfort a few times a month that can be substernal lasting two to three minutes at a time. This last occurred a few nights ago. Overall, her breathing has been okay. She is interested in having carpal tunnel surgery on her right hand at some point in the near future, however, she saw her orthopedic, Dr. Bell Paulino and they said she needed to wait until next November given her Plavix. I do think she can hold her Plavix in May, as that would be greater than six months, however, if she did stop her Plavix she would need to stay on 81 mg of aspirin. She is compensated on exam with clear lungs and no lower extremity edema. Assessment and Plan:   1. Coronary artery disease. Stable and compensated. We will continue her Plavix. She is not on aspirin as she is on Eliquis. She is on a statin, but her LDL recently was elevated. She is going to try lifestyle changes before making any adjustment. Of note in the past, there have been some discussions about WATCHMAN, but this was deferred. 2. Atrial fibrillation, well controlled. She did have cardioversion in the past.    3. Preop cardiac evaluation. She is stable cardiac wise and she can proceed with carpal tunnel surgery.   With regard to blood thinners, in May she will be greater than six months post stent and she can hold her Plavix for this surgery. However if she is off the Plavix, she would need to be on 81 mg of aspirin. Eliquis could be held 48 hours prior. Will send a report to Dr. Dylon Lew and her primary care physician. 4. Status post knee replacement in 2015.   5. Essential hypertension. Blood pressure is controlled. 6. Type 2 diabetes. 7. Dyslipidemia. 8. Gastroesophageal reflux disease. If you have questions, please do not hesitate to call me.       Sincerely,      Virginie Diggs MD

## 2022-03-03 ENCOUNTER — ANCILLARY PROCEDURE (OUTPATIENT)
Dept: CARDIOLOGY CLINIC | Age: 62
End: 2022-03-03
Payer: MEDICARE

## 2022-03-03 VITALS
SYSTOLIC BLOOD PRESSURE: 138 MMHG | DIASTOLIC BLOOD PRESSURE: 88 MMHG | WEIGHT: 172 LBS | BODY MASS INDEX: 30.48 KG/M2 | HEIGHT: 63 IN

## 2022-03-03 DIAGNOSIS — R07.9 CHEST PAIN, UNSPECIFIED TYPE: ICD-10-CM

## 2022-03-03 DIAGNOSIS — I25.110 CORONARY ARTERY DISEASE INVOLVING NATIVE CORONARY ARTERY OF NATIVE HEART WITH UNSTABLE ANGINA PECTORIS (HCC): ICD-10-CM

## 2022-03-03 DIAGNOSIS — I48.0 PAROXYSMAL ATRIAL FIBRILLATION (HCC): ICD-10-CM

## 2022-03-03 DIAGNOSIS — E11.8 DM TYPE 2, CONTROLLED, WITH COMPLICATION (HCC): ICD-10-CM

## 2022-03-03 DIAGNOSIS — I10 ESSENTIAL HYPERTENSION: ICD-10-CM

## 2022-03-03 DIAGNOSIS — I10 PRIMARY HYPERTENSION: Chronic | ICD-10-CM

## 2022-03-03 DIAGNOSIS — I25.10 CORONARY ARTERY DISEASE INVOLVING NATIVE CORONARY ARTERY OF NATIVE HEART WITHOUT ANGINA PECTORIS: ICD-10-CM

## 2022-03-03 DIAGNOSIS — E78.5 DYSLIPIDEMIA: ICD-10-CM

## 2022-03-03 LAB
NUC STRESS EJECTION FRACTION: 62 %
STRESS ANGINA INDEX: 1
STRESS BASELINE DIAS BP: 88 MMHG
STRESS BASELINE HR: 80 BPM
STRESS BASELINE ST DEPRESSION: 0 MM
STRESS BASELINE SYS BP: 138 MMHG
STRESS ESTIMATED WORKLOAD: 7 METS
STRESS EXERCISE DUR MIN: 7 MIN
STRESS EXERCISE DUR SEC: 0 SEC
STRESS O2 SAT PEAK: 99 %
STRESS O2 SAT REST: 99 %
STRESS PEAK DIAS BP: 80 MMHG
STRESS PEAK SYS BP: 170 MMHG
STRESS PERCENT HR ACHIEVED: 85 %
STRESS POST PEAK HR: 134 BPM
STRESS RATE PRESSURE PRODUCT: NORMAL BPM*MMHG
STRESS TARGET HR: 157 BPM
TID: 1.06

## 2022-03-03 PROCEDURE — A9500 TC99M SESTAMIBI: HCPCS | Performed by: INTERNAL MEDICINE

## 2022-03-03 PROCEDURE — 93016 CV STRESS TEST SUPVJ ONLY: CPT | Performed by: INTERNAL MEDICINE

## 2022-03-03 PROCEDURE — 93017 CV STRESS TEST TRACING ONLY: CPT | Performed by: INTERNAL MEDICINE

## 2022-03-03 PROCEDURE — 93018 CV STRESS TEST I&R ONLY: CPT | Performed by: INTERNAL MEDICINE

## 2022-03-03 PROCEDURE — 78452 HT MUSCLE IMAGE SPECT MULT: CPT | Performed by: INTERNAL MEDICINE

## 2022-03-03 RX ORDER — TETRAKIS(2-METHOXYISOBUTYLISOCYANIDE)COPPER(I) TETRAFLUOROBORATE 1 MG/ML
10 INJECTION, POWDER, LYOPHILIZED, FOR SOLUTION INTRAVENOUS ONCE
Status: COMPLETED | OUTPATIENT
Start: 2022-03-03 | End: 2022-03-03

## 2022-03-03 RX ORDER — TETRAKIS(2-METHOXYISOBUTYLISOCYANIDE)COPPER(I) TETRAFLUOROBORATE 1 MG/ML
30 INJECTION, POWDER, LYOPHILIZED, FOR SOLUTION INTRAVENOUS ONCE
Status: COMPLETED | OUTPATIENT
Start: 2022-03-03 | End: 2022-03-03

## 2022-03-03 RX ADMIN — TECHNETIUM TC 99M SESTAMIBI 23.5 MILLICURIE: 1 INJECTION INTRAVENOUS at 15:00

## 2022-03-03 RX ADMIN — TETRAKIS(2-METHOXYISOBUTYLISOCYANIDE)COPPER(I) TETRAFLUOROBORATE 8.7 MILLICURIE: 1 INJECTION, POWDER, LYOPHILIZED, FOR SOLUTION INTRAVENOUS at 13:25

## 2022-03-04 ENCOUNTER — TELEPHONE (OUTPATIENT)
Dept: CARDIOLOGY CLINIC | Age: 62
End: 2022-03-04

## 2022-03-04 NOTE — TELEPHONE ENCOUNTER
----- Message from Hernandez Burton MD sent at 3/3/2022  5:37 PM EST -----  Please let pt know stress test was normal. thx

## 2022-03-18 PROBLEM — I25.10 CORONARY ARTERY DISEASE INVOLVING NATIVE CORONARY ARTERY OF NATIVE HEART WITHOUT ANGINA PECTORIS: Status: ACTIVE | Noted: 2018-03-29

## 2022-03-25 RX ORDER — METOPROLOL SUCCINATE 50 MG/1
50 TABLET, EXTENDED RELEASE ORAL DAILY
Qty: 90 TABLET | Refills: 3 | OUTPATIENT
Start: 2022-03-25

## 2022-03-25 RX ORDER — METOPROLOL SUCCINATE 50 MG/1
TABLET, EXTENDED RELEASE ORAL
Qty: 90 TABLET | Refills: 3 | Status: SHIPPED | OUTPATIENT
Start: 2022-03-25 | End: 2022-05-13 | Stop reason: SDUPTHER

## 2022-03-30 RX ORDER — APIXABAN 5 MG/1
TABLET, FILM COATED ORAL
Qty: 180 TABLET | Refills: 3 | Status: SHIPPED | OUTPATIENT
Start: 2022-03-30 | End: 2022-05-13 | Stop reason: SDUPTHER

## 2022-05-13 ENCOUNTER — OFFICE VISIT (OUTPATIENT)
Dept: CARDIOLOGY CLINIC | Age: 62
End: 2022-05-13
Payer: MEDICARE

## 2022-05-13 VITALS
SYSTOLIC BLOOD PRESSURE: 130 MMHG | OXYGEN SATURATION: 98 % | HEART RATE: 79 BPM | RESPIRATION RATE: 16 BRPM | BODY MASS INDEX: 31.18 KG/M2 | DIASTOLIC BLOOD PRESSURE: 80 MMHG | WEIGHT: 176 LBS | HEIGHT: 63 IN

## 2022-05-13 DIAGNOSIS — I48.0 PAROXYSMAL ATRIAL FIBRILLATION (HCC): Primary | ICD-10-CM

## 2022-05-13 DIAGNOSIS — E78.5 DYSLIPIDEMIA: ICD-10-CM

## 2022-05-13 DIAGNOSIS — I10 BENIGN ESSENTIAL HTN: ICD-10-CM

## 2022-05-13 DIAGNOSIS — I25.110 CORONARY ARTERY DISEASE INVOLVING NATIVE CORONARY ARTERY OF NATIVE HEART WITH UNSTABLE ANGINA PECTORIS (HCC): ICD-10-CM

## 2022-05-13 DIAGNOSIS — E11.9 TYPE 2 DIABETES MELLITUS WITHOUT COMPLICATION, WITHOUT LONG-TERM CURRENT USE OF INSULIN (HCC): ICD-10-CM

## 2022-05-13 PROCEDURE — G8752 SYS BP LESS 140: HCPCS | Performed by: INTERNAL MEDICINE

## 2022-05-13 PROCEDURE — G8427 DOCREV CUR MEDS BY ELIG CLIN: HCPCS | Performed by: INTERNAL MEDICINE

## 2022-05-13 PROCEDURE — 3046F HEMOGLOBIN A1C LEVEL >9.0%: CPT | Performed by: INTERNAL MEDICINE

## 2022-05-13 PROCEDURE — G0463 HOSPITAL OUTPT CLINIC VISIT: HCPCS | Performed by: INTERNAL MEDICINE

## 2022-05-13 PROCEDURE — 3017F COLORECTAL CA SCREEN DOC REV: CPT | Performed by: INTERNAL MEDICINE

## 2022-05-13 PROCEDURE — 99214 OFFICE O/P EST MOD 30 MIN: CPT | Performed by: INTERNAL MEDICINE

## 2022-05-13 PROCEDURE — G8417 CALC BMI ABV UP PARAM F/U: HCPCS | Performed by: INTERNAL MEDICINE

## 2022-05-13 PROCEDURE — 2022F DILAT RTA XM EVC RTNOPTHY: CPT | Performed by: INTERNAL MEDICINE

## 2022-05-13 PROCEDURE — G8432 DEP SCR NOT DOC, RNG: HCPCS | Performed by: INTERNAL MEDICINE

## 2022-05-13 PROCEDURE — G8754 DIAS BP LESS 90: HCPCS | Performed by: INTERNAL MEDICINE

## 2022-05-13 RX ORDER — CLOPIDOGREL BISULFATE 75 MG/1
75 TABLET ORAL DAILY
Qty: 30 TABLET | Refills: 11 | Status: SHIPPED | OUTPATIENT
Start: 2022-05-13 | End: 2022-08-26 | Stop reason: SDUPTHER

## 2022-05-13 RX ORDER — METOPROLOL SUCCINATE 50 MG/1
50 TABLET, EXTENDED RELEASE ORAL DAILY
Qty: 90 TABLET | Refills: 3 | Status: SHIPPED | OUTPATIENT
Start: 2022-05-13

## 2022-05-13 RX ORDER — PREDNISOLN SP/MOXIFLOX/BROMFEN 1 %-0.5 %
DROPS OPHTHALMIC (EYE) AS DIRECTED
COMMUNITY
Start: 2022-04-04

## 2022-05-13 RX ORDER — ENALAPRIL MALEATE 10 MG/1
10 TABLET ORAL DAILY
Qty: 90 TABLET | Refills: 3 | Status: SHIPPED | OUTPATIENT
Start: 2022-05-13

## 2022-05-13 RX ORDER — ATORVASTATIN CALCIUM 40 MG/1
40 TABLET, FILM COATED ORAL
Qty: 90 TABLET | Refills: 3 | Status: SHIPPED | OUTPATIENT
Start: 2022-05-13

## 2022-05-13 NOTE — PROGRESS NOTES
[facility] [address]: [place]um  ([id number]    History of Present Illness:  Ms. Angelica Burns is a 59 yo F seen in the past by Dr. Barrett Mayfield and Eros Navarro, coronary artery disease with recent cardiac catheterization with Dr. Harmony Duong on 11/19/2021 demonstrating ostial/proximal LAD, mid LAD and significant mid circumflex lesions treated with two drug eluting stents to the LAD and one drug eluting stent to the circumflex. 3/2022 stress test was normal.    On her last visit due to symptoms of chest pain, proceeded with a stress test that was normal with no reversible ischemia. She does continue to get chest discomfort, but usually this is nonexertional.  Once or twice a month though she may get some discomfort when she is walking. She has been compliant with her medications. Her primary care did recommend she see a GI doctor and I agree with this. She is compensated on exam with clear lungs and no lower extremity edema. Assessment and Plan:   1. Chest pain. Her symptoms are mostly atypical.  Her stress test was also normal.  I do think GI evaluation is appropriate and she is trying to set this up. Could also be musculoskeletal in etiology. Will tentatively have her follow back in three months and reassess. 2. Coronary artery disease, status post stents in 11/2021. She is now greater than six months post stent, so if she does need to have surgeries or procedures done Plavix can be held; however, she would need to continue aspirin 81 mg. Will send a preop evaluation to Dr. Adalgisa Sheppard. Eliquis can be held 48 hours prior. 3. Status post knee replacement in 2015. 4. Essential hypertension. Blood pressure is controlled. 5. Type 2 diabetes. 6. Dyslipidemia. 7. Gastroesophageal reflux disease.           Echo 10/20 Ef 60% - mild MR  Nuc Stress test 6/18 - no ischemia Ef 69%  Echo 3/17 - LVEF 55-60%, no WMA, trivial MR  Loop Monitor 5/16 - no arrhythmias  Brennon Nuc Stress 1/16 - no ischemia, LVEF 60%  Echo 12/15 - LVEF 55-60%, no WMA  Cardiac Cath 11/2000 - no CAD, no plaque, apical HK, LVEF 67%    Fam Hx: brother with MI at age 37, mother had CABG (patient of Dr. John Paul Byrd)  Soc Hx: no tobacco use      She  has a past medical history of Arthritis, Asthma, Atrial fibrillation ([facility] [address] Diabetes ([facility] [address] GERD (gastroesophageal reflux disease), Glaucoma, High cholesterol, Hypertension, Long term current use of anticoagulant therapy, Nausea & vomiting, and Psychiatric disorder. All other systems negative except as above. PE  There were no vitals filed for this visit. There is no height or weight on file to calculate BMI.    General appearance - alert, well appearing, and in no distress  Mental status - affect appropriate to mood  Eyes - sclera anicteric, moist mucous membranes  Neck - supple, no JVD  Chest - clear to auscultation, no wheezes, rales or rhonchi  Heart - normal rate, regular rhythm, normal S1, S2, no murmurs, rubs, clicks or gallops  Abdomen - soft, nontender, nondistended, no masses or organomegaly  Neurological - no focal deficit  Extremities - peripheral pulses normal, no pedal edema      Recent Labs:  No results found for: CHOL, CHOLX, CHLST, CHOLV, 225244, HDL, HDLP, LDL, LDLC, DLDLP, TGLX, TRIGL, TRIGP, CHHD, CHHDX  Lab Results   Component Value Date/Time    Creatinine (POC) 0.50 (L) 07/13/2021 03:02 PM    Creatinine 0.64 11/11/2021 11:28 AM     Lab Results   Component Value Date/Time    BUN 15 11/11/2021 11:28 AM     Lab Results   Component Value Date/Time    Potassium 4.2 11/11/2021 11:28 AM     Lab Results   Component Value Date/Time    Hemoglobin A1c 8.1 (H) 12/15/2015 03:36 PM     Lab Results   Component Value Date/Time    HGB 14.2 11/11/2021 11:28 AM     Lab Results   Component Value Date/Time    PLATELET 239 56/81/6324 11:28 AM       Reviewed:  Past Medical History:   Diagnosis Date    Arthritis     Asthma     Atrial fibrillation (HCC)     Diabetes (HCC)     GERD (gastroesophageal reflux disease)     Glaucoma     High cholesterol     Hypertension     Long term current use of anticoagulant therapy     Nausea & vomiting     Psychiatric disorder     DEPRESSION     Social History     Tobacco Use   Smoking Status Former Smoker    Packs/day: 0.25    Years: 10.00    Pack years: 2.50    Quit date: 12/15/2003    Years since quittin.4   Smokeless Tobacco Never Used     Social History     Substance and Sexual Activity   Alcohol Use No     Allergies   Allergen Reactions    Bactrim [Sulfamethoprim Ds] Rash and Nausea and Vomiting    Other Medication Rash     SURGICAL GLUE    Oxycodone Nausea and Vomiting       Current Outpatient Medications   Medication Sig    Eliquis 5 mg tablet TAKE 1 TABLET BY MOUTH TWICE A DAY    metoprolol succinate (TOPROL-XL) 50 mg XL tablet TAKE 1 TABLET BY MOUTH EVERY DAY    diclofenac (VOLTAREN) 1 % gel APPLY 4 GRAMS TP AFFECTED AREAS ON FEET 4 TIMES A DAY    meloxicam (MOBIC) 15 mg tablet TAKE 1 TABLET BY MOUTH EVERY DAY WITH FOOD    nitroglycerin (NITROSTAT) 0.4 mg SL tablet DISSOLVE 1 TABLET UNDER THE TONGUE EVERY 5 MINUTES AS NEEDED FOR CHEST PAIN FOR UP TO 3 DOSES.  clopidogreL (Plavix) 75 mg tab Take 1 Tablet by mouth daily for 360 days. Indications: a sudden worsening of angina called acute coronary syndrome    Breo Ellipta 200-25 mcg/dose inhaler     clindamycin (CLINDAGEL) 1 % topical gel  (Patient not taking: Reported on 2022)    amoxicillin (AMOXIL) 500 mg capsule     triamcinolone acetonide (KENALOG) 0.1 % topical cream APPLY TO AFFECTED AREA TWICE A DAY    dorzolamide-timoloL (COSOPT) 22.3-6.8 mg/mL ophthalmic solution     clotrimazole-betamethasone (LOTRISONE) topical cream     azelastine (ASTELIN) 137 mcg (0.1 %) nasal spray     brimonidine (ALPHAGAN) 0.15 % ophthalmic solution     enalapril (VASOTEC) 10 mg tablet TAKE 1 TABLET BY MOUTH EVERY DAY    fluconazole (DIFLUCAN) 100 mg tablet Take 100 mg by mouth every seven (7) days.  FDA advises cautious prescribing of oral fluconazole in pregnancy.  montelukast (SINGULAIR) 10 mg tablet Take 10 mg by mouth daily.  atorvastatin (LIPITOR) 40 mg tablet TAKE 1 TABLET BY MOUTH EVERY DAY AT NIGHT    dapagliflozin (FARXIGA) 10 mg tab tablet Take  by mouth daily.  acetaminophen (TYLENOL) 500 mg tablet Take 1 Tab by mouth every four (4) hours (while awake). (Patient taking differently: Take 500 mg by mouth every six (6) hours as needed.)    insulin aspart protamine/insulin aspart (NOVOLOG MIX 70-30 FLEXPEN) 100 unit/mL (70-30) flex pen by SubCUTAneous route three (3) times daily. PT TAKES ON SS, BUT SHE CANNOT TELL WHAT THE SCALE IS-STATES IT'S BASED ON WHAT SHE IS GOING TO EAT.  minocycline (MINOCIN, DYNACIN) 100 mg capsule Take  by mouth daily as needed.  pantoprazole (PROTONIX) 40 mg tablet Take 40 mg by mouth daily.  olopatadine (PATANOL) 0.1 % ophthalmic solution Administer 2 Drops to both eyes two (2) times a day.  peg 400-propylene glycol (SYSTANE GEL) 0.4-0.3 % drpg Apply  to eye two (2) times a day.  cycloSPORINE (RESTASIS) 0.05 % ophthalmic emulsion Administer 1 Drop to both eyes as needed.  fluticasone (FLONASE) 50 mcg/actuation nasal spray 2 Sprays by Both Nostrils route two (2) times a day. No current facility-administered medications for this visit.        Nico Qureshi MD  [facility] heart and Vascular [place]  [address] [facility] [address]  [address]

## 2022-05-13 NOTE — PATIENT INSTRUCTIONS
Please follow up in 3 months. All of your cardiac medications will be refilled and sent to your pharmacy.

## 2022-05-13 NOTE — LETTER
Patient:  Noel Meza   YOB: 1960  Date of Visit: 5/13/2022      Dear John Harper MD  [address] [place] [address] [facility] [address]  Via Fax: [phone]:      Ms. Jo Ann Brito is a 61 yo F seen in the past by Dr. Julianna Londono and Yareli Tavares, coronary artery disease with recent cardiac catheterization with Dr. Deborah Chilel on 11/19/2021 demonstrating ostial/proximal LAD, mid LAD and significant mid circumflex lesions treated with two drug eluting stents to the LAD and one drug eluting stent to the circumflex. 3/2022 stress test was normal.    On her last visit due to symptoms of chest pain, proceeded with a stress test that was normal with no reversible ischemia. She does continue to get chest discomfort, but usually this is nonexertional.  Once or twice a month though she may get some discomfort when she is walking. She has been compliant with her medications. Her primary care did recommend she see a GI doctor and I agree with this. She is compensated on exam with clear lungs and no lower extremity edema. Assessment and Plan:   1. Chest pain. Her symptoms are mostly atypical.  Her stress test was also normal.  I do think GI evaluation is appropriate and she is trying to set this up. Could also be musculoskeletal in etiology. Will tentatively have her follow back in three months and reassess. 2. Coronary artery disease, status post stents in 11/2021. She is now greater than six months post stent, so if she does need to have surgeries or procedures done Plavix can be held; however, she would need to continue aspirin 81 mg. Will send a preop evaluation to Dr. Layne Hoyos. Eliquis can be held 48 hours prior. 3. Status post knee replacement in 2015. 4. Essential hypertension. Blood pressure is controlled. 5. Type 2 diabetes. 6. Dyslipidemia. 7. Gastroesophageal reflux disease. If you have questions, please do not hesitate to call me.       Sincerely,      Esperanza Ramsey MD

## 2022-05-15 PROBLEM — E11.9 TYPE 2 DIABETES MELLITUS (HCC): Status: ACTIVE | Noted: 2022-05-15

## 2022-07-21 ENCOUNTER — TELEPHONE (OUTPATIENT)
Dept: CARDIOLOGY CLINIC | Age: 62
End: 2022-07-21

## 2022-07-21 NOTE — TELEPHONE ENCOUNTER
Patient to be scheduled for colonoscopy (due to heme positive stool) with Dr. aYsmani Altamirano. Requesting to hold eliquisx 3 days. To fax clearance to [phone].

## 2022-07-25 NOTE — TELEPHONE ENCOUNTER
Faxed NP note to [facility] @ [phone] on 7-28-22. Confirmation received.

## 2022-07-25 NOTE — TELEPHONE ENCOUNTER
Patient is Syriac speaking. Just wanted to let her know that clearance has been faxed and that she rio hold eliquis x 48 hours prior.

## 2022-07-26 ENCOUNTER — TELEPHONE (OUTPATIENT)
Dept: CARDIOLOGY CLINIC | Age: 62
End: 2022-07-26

## 2022-07-26 NOTE — TELEPHONE ENCOUNTER
Cori from [facility] is calling in regards to a Cardiac Clearance. Muna Garcia is re faxing the forms to us . Please Advise.     P- [phone]    F - [phone]

## 2022-07-30 ENCOUNTER — HOSPITAL ENCOUNTER (EMERGENCY)
Age: 62
Discharge: HOME | End: 2022-07-30
Attending: STUDENT IN AN ORGANIZED HEALTH CARE EDUCATION/TRAINING PROGRAM
Payer: MEDICARE

## 2022-07-30 VITALS
SYSTOLIC BLOOD PRESSURE: 129 MMHG | TEMPERATURE: 98.1 F | RESPIRATION RATE: 16 BRPM | BODY MASS INDEX: 31.25 KG/M2 | OXYGEN SATURATION: 98 % | HEART RATE: 80 BPM | WEIGHT: 176.37 LBS | DIASTOLIC BLOOD PRESSURE: 74 MMHG | HEIGHT: 63 IN

## 2022-07-30 DIAGNOSIS — L03.114 CELLULITIS OF LEFT UPPER EXTREMITY: Primary | ICD-10-CM

## 2022-07-30 PROCEDURE — 99283 EMERGENCY DEPT VISIT LOW MDM: CPT

## 2022-07-30 RX ORDER — DOXYCYCLINE HYCLATE 100 MG
100 TABLET ORAL 2 TIMES DAILY
Qty: 14 TABLET | Refills: 0 | Status: SHIPPED | OUTPATIENT
Start: 2022-07-30 | End: 2022-08-06

## 2022-07-30 NOTE — ED TRIAGE NOTES
Patient arrives to the ED with chief complaint of itching and swelling to the left hand onset yesterday. Yesterday patient was bitten by a large insect, patient does not know the name of the insect. There was redness last night, so patient took a Benadryl. No redness present today.

## 2022-08-10 NOTE — ED PROVIDER NOTES
Patient is a 69-year-old female present emergency department complaining of itching and swelling to the left hand. Patient states that she was bitten by an insect does not know the name of the insect or when it occurred. There is redness to the hand last night before she went to bed she took a Benadryl and now the redness has disappeared. Patient requesting \"for a medication to have the poison removed from her body\".        Past Medical History:   Diagnosis Date    Arthritis     Asthma     Atrial fibrillation (HCC)     Diabetes ([address]     GERD (gastroesophageal reflux disease)     Glaucoma     High cholesterol     Hypertension     Long term current use of anticoagulant therapy     Nausea & vomiting     Psychiatric disorder     DEPRESSION       Past Surgical History:   Procedure Laterality Date    HX  SECTION  1331,1113    HX GYN  1986    left ovarian cyst    HX GYN  1987    cyst on tube    HX GYN  1988    cyst removed    HX HEART CATHETERIZATION  2000    HX HEENT      GLAUCOMA SURGERY    HX HEENT  2007    TMJ    HX ORTHOPAEDIC  2008    RIGHT ACL    HX ORTHOPAEDIC  2010    RIGHT TKR    HX ORTHOPAEDIC  2012    CARPAL TUNNEL    HX ORTHOPAEDIC  2013    CARPAL TUNNEL    HX ORTHOPAEDIC  2014    RIGHT TKR    HI BREAST SURGERY PROCEDURE UNLISTED      KEERTHI TUMOR REMOVED-BENIGN         Family History:   Problem Relation Age of Onset    Cancer Father         PROSTATE    Heart Attack Brother     Anesth Problems Neg Hx        Social History     Socioeconomic History    Marital status: LEGALLY      Spouse name: Not on file    Number of children: Not on file    Years of education: Not on file    Highest education level: Not on file   Occupational History    Not on file   Tobacco Use    Smoking status: Former     Packs/day: 0.25     Years: 10.00     Pack years: 2.50     Types: Cigarettes     Quit date: 12/15/2003     Years since quittin.6    Smokeless tobacco: Never   Substance and Sexual Activity Alcohol use: No    Drug use: No    Sexual activity: Not on file   Other Topics Concern    Not on file   Social History Narrative    Not on file     Social Determinants of Health     Financial Resource Strain: Not on file   Food Insecurity: Not on file   Transportation Needs: Not on file   Physical Activity: Not on file   Stress: Not on file   Social Connections: Not on file   Intimate Partner Violence: Not on file   Housing Stability: Not on file         ALLERGIES: Bactrim [sulfamethoprim ds], Other medication, and Oxycodone    Review of Systems   Musculoskeletal:  Positive for joint swelling. Skin:  Positive for rash and wound. All other systems reviewed and are negative. Vitals:    07/30/22 1215   BP: 129/74   Pulse: 80   Resp: 16   Temp: 98.1 °F (36.7 °C)   SpO2: 98%   Weight: 80 kg (176 lb 5.9 oz)   Height: 5' 3\" (1.6 m)            Physical Exam  Vitals and nursing note reviewed. Constitutional:       Appearance: Normal appearance. HENT:      Head: Normocephalic and atraumatic. Eyes:      Extraocular Movements: Extraocular movements intact. Pupils: Pupils are equal, round, and reactive to light. Cardiovascular:      Rate and Rhythm: Normal rate and regular rhythm. Pulses: Normal pulses. Heart sounds: Normal heart sounds. Pulmonary:      Effort: Pulmonary effort is normal.      Breath sounds: Normal breath sounds. Musculoskeletal:         General: Normal range of motion. Left hand: Swelling present. No lacerations, tenderness or bony tenderness. Normal range of motion. Normal strength. Normal sensation. Hands:       Cervical back: Normal range of motion and neck supple. Skin:     General: Skin is warm. Neurological:      General: No focal deficit present. Mental Status: She is alert and oriented to person, place, and time.    Psychiatric:         Mood and Affect: Mood normal.         Behavior: Behavior normal.        MDM  Number of Diagnoses or Management Options  Cellulitis of left upper extremity  Diagnosis management comments: Cellulitis of left upper extremity. 59-year-old female present emergency department after likely insect bite now with redness to the dorsum of the left hand. Will prescribe antibiotics.            Procedures

## 2022-08-26 ENCOUNTER — OFFICE VISIT (OUTPATIENT)
Dept: CARDIOLOGY CLINIC | Age: 62
End: 2022-08-26
Payer: MEDICARE

## 2022-08-26 VITALS
BODY MASS INDEX: 31.18 KG/M2 | OXYGEN SATURATION: 97 % | DIASTOLIC BLOOD PRESSURE: 62 MMHG | HEIGHT: 63 IN | SYSTOLIC BLOOD PRESSURE: 110 MMHG | RESPIRATION RATE: 17 BRPM | HEART RATE: 80 BPM | WEIGHT: 176 LBS

## 2022-08-26 DIAGNOSIS — I48.0 PAROXYSMAL ATRIAL FIBRILLATION (HCC): Primary | ICD-10-CM

## 2022-08-26 DIAGNOSIS — I10 BENIGN ESSENTIAL HTN: ICD-10-CM

## 2022-08-26 DIAGNOSIS — E11.8 DM TYPE 2, CONTROLLED, WITH COMPLICATION (HCC): ICD-10-CM

## 2022-08-26 DIAGNOSIS — I25.110 CORONARY ARTERY DISEASE INVOLVING NATIVE CORONARY ARTERY OF NATIVE HEART WITH UNSTABLE ANGINA PECTORIS (HCC): ICD-10-CM

## 2022-08-26 DIAGNOSIS — E78.5 DYSLIPIDEMIA: ICD-10-CM

## 2022-08-26 PROCEDURE — G8417 CALC BMI ABV UP PARAM F/U: HCPCS | Performed by: INTERNAL MEDICINE

## 2022-08-26 PROCEDURE — G8510 SCR DEP NEG, NO PLAN REQD: HCPCS | Performed by: INTERNAL MEDICINE

## 2022-08-26 PROCEDURE — 3017F COLORECTAL CA SCREEN DOC REV: CPT | Performed by: INTERNAL MEDICINE

## 2022-08-26 PROCEDURE — 2022F DILAT RTA XM EVC RTNOPTHY: CPT | Performed by: INTERNAL MEDICINE

## 2022-08-26 PROCEDURE — G8427 DOCREV CUR MEDS BY ELIG CLIN: HCPCS | Performed by: INTERNAL MEDICINE

## 2022-08-26 PROCEDURE — G8752 SYS BP LESS 140: HCPCS | Performed by: INTERNAL MEDICINE

## 2022-08-26 PROCEDURE — G8754 DIAS BP LESS 90: HCPCS | Performed by: INTERNAL MEDICINE

## 2022-08-26 PROCEDURE — 3046F HEMOGLOBIN A1C LEVEL >9.0%: CPT | Performed by: INTERNAL MEDICINE

## 2022-08-26 PROCEDURE — 99214 OFFICE O/P EST MOD 30 MIN: CPT | Performed by: INTERNAL MEDICINE

## 2022-08-26 RX ORDER — CLOPIDOGREL BISULFATE 75 MG/1
75 TABLET ORAL DAILY
Qty: 90 TABLET | Refills: 0 | Status: SHIPPED | OUTPATIENT
Start: 2022-08-26 | End: 2023-08-21

## 2022-08-26 NOTE — PROGRESS NOTES
[facility] [address]: B[place]um  ([id number]    History of Present Illness:  Ms. Mansi Robison is a 61 yo F seen in the past by Dr. Gabriela Pastrana and Dawna Alvarez, coronary artery disease with recent cardiac catheterization with Dr. Latonya Issa on 11/19/2021 demonstrating ostial/proximal LAD, mid LAD and significant mid circumflex lesions treated with two drug eluting stents to the LAD and one drug eluting stent to the circumflex. 3/2022 stress test was normal.    Since her last visit, she did end up seeing GI and says they did evaluation a few weeks ago, but she does not have the results yet. Over a week ago, she did have approximately three days where she felt headaches, tired and her blood pressure was up. This ended up resolving on its own. She has been eating and drinking okay. She denies any exertional chest pain and her breathing has been stable. She is compensated on exam with clear lungs and no lower extremity edema. Assessment and Plan:   1. Chest pain. Unclear etiology and she had recent GI workup. Her cardiac testing was normal, including a stress test and her symptoms are nonexertional.  No additional cardiac evaluation is indicated at this time and will have her follow back in six months. She did have recent GI evaluation. 2. Coronary artery disease, status post stents in 11/2021. She is now greater than six months. We will have her continue her current cardiac regimen. 3. Status post knee surgery in 2015. 4. Essential hypertension. Blood pressure is controlled. 5. Type 2 diabetes. 6. Dyslipidemia. 7. Gastroesophageal reflux disease.              Echo 10/20 Ef 60% - mild MR  Nuc Stress test 6/18 - no ischemia Ef 69%  Echo 3/17 - LVEF 55-60%, no WMA, trivial MR  Loop Monitor 5/16 - no arrhythmias  Brennon Nuc Stress 1/16 - no ischemia, LVEF 60%  Echo 12/15 - LVEF 55-60%, no WMA  Cardiac Cath 11/2000 - no CAD, no plaque, apical HK, LVEF 67%    Fam Hx: brother with MI at age 37, mother had CABG (patient of Dr. Cohen Speak)  Soc Hx: no tobacco use      She  has a past medical history of Arthritis, Asthma, Atrial fibrillation ([facility] [address] Diabetes ([facility] [address] GERD (gastroesophageal reflux disease), Glaucoma, High cholesterol, Hypertension, Long term current use of anticoagulant therapy, Nausea & vomiting, and Psychiatric disorder. All other systems negative except as above. PE  Vitals:    08/26/22 1322   BP: 110/62   Pulse: 80   Resp: 17   SpO2: 97%   Weight: 176 lb (79.8 kg)   Height: 5' 3\" (1.6 m)    Body mass index is 31.18 kg/m².    General appearance - alert, well appearing, and in no distress  Mental status - affect appropriate to mood  Eyes - sclera anicteric, moist mucous membranes  Neck - supple, no JVD  Chest - clear to auscultation, no wheezes, rales or rhonchi  Heart - normal rate, regular rhythm, normal S1, S2, no murmurs, rubs, clicks or gallops  Abdomen - soft, nontender, nondistended, no masses or organomegaly  Neurological - no focal deficit  Extremities - peripheral pulses normal, no pedal edema      Recent Labs:  No results found for: CHOL, CHOLX, CHLST, CHOLV, 314261, HDL, HDLP, LDL, LDLC, DLDLP, TGLX, TRIGL, TRIGP, CHHD, CHHDX  Lab Results   Component Value Date/Time    Creatinine (POC) 0.50 (L) 07/13/2021 03:02 PM    Creatinine 0.64 11/11/2021 11:28 AM     Lab Results   Component Value Date/Time    BUN 15 11/11/2021 11:28 AM     Lab Results   Component Value Date/Time    Potassium 4.2 11/11/2021 11:28 AM     Lab Results   Component Value Date/Time    Hemoglobin A1c 8.1 (H) 12/15/2015 03:36 PM     Lab Results   Component Value Date/Time    HGB 14.2 11/11/2021 11:28 AM     Lab Results   Component Value Date/Time    PLATELET 037 78/39/8999 11:28 AM       Reviewed:  Past Medical History:   Diagnosis Date    Arthritis     Asthma     Atrial fibrillation (HCC)     Diabetes (HCC)     GERD (gastroesophageal reflux disease)     Glaucoma     High cholesterol     Hypertension     Long term current use of anticoagulant therapy     Nausea & vomiting     Psychiatric disorder     DEPRESSION     Social History     Tobacco Use   Smoking Status Former    Packs/day: 0.25    Years: 10.00    Pack years: 2.50    Types: Cigarettes    Quit date: 12/15/2003    Years since quittin.7   Smokeless Tobacco Never     Social History     Substance and Sexual Activity   Alcohol Use No     Allergies   Allergen Reactions    Bactrim [Sulfamethoprim Ds] Rash and Nausea and Vomiting    Other Medication Rash     SURGICAL GLUE    Oxycodone Nausea and Vomiting       Current Outpatient Medications   Medication Sig    prednisoln [id number] 1-0.5-0.075 % drop as directed. apixaban (Eliquis) 5 mg tablet Take 1 Tablet by mouth two (2) times a day. metoprolol succinate (TOPROL-XL) 50 mg XL tablet Take 1 Tablet by mouth daily. clopidogreL (Plavix) 75 mg tab Take 1 Tablet by mouth daily for 360 days. Indications: a sudden worsening of angina called acute coronary syndrome    enalapril (VASOTEC) 10 mg tablet Take 1 Tablet by mouth daily. atorvastatin (LIPITOR) 40 mg tablet Take 1 Tablet by mouth nightly. diclofenac (VOLTAREN) 1 % gel APPLY 4 GRAMS TP AFFECTED AREAS ON FEET 4 TIMES A DAY    meloxicam (MOBIC) 15 mg tablet TAKE 1 TABLET BY MOUTH EVERY DAY WITH FOOD    nitroglycerin (NITROSTAT) 0.4 mg SL tablet DISSOLVE 1 TABLET UNDER THE TONGUE EVERY 5 MINUTES AS NEEDED FOR CHEST PAIN FOR UP TO 3 DOSES. Breo Ellipta 200-25 mcg/dose inhaler     clindamycin (CLINDAGEL) 1 % topical gel     amoxicillin (AMOXIL) 500 mg capsule     triamcinolone acetonide (KENALOG) 0.1 % topical cream APPLY TO AFFECTED AREA TWICE A DAY    dorzolamide-timoloL (COSOPT) 22.3-6.8 mg/mL ophthalmic solution     clotrimazole-betamethasone (LOTRISONE) topical cream     azelastine (ASTELIN) 137 mcg (0.1 %) nasal spray     brimonidine (ALPHAGAN) 0.15 % ophthalmic solution     fluconazole (DIFLUCAN) 100 mg tablet Take 100 mg by mouth every seven (7) days.  FDA advises cautious prescribing of oral fluconazole in pregnancy. montelukast (SINGULAIR) 10 mg tablet Take 10 mg by mouth daily. dapagliflozin (FARXIGA) 10 mg tab tablet Take  by mouth daily. acetaminophen (TYLENOL) 500 mg tablet Take 1 Tab by mouth every four (4) hours (while awake). (Patient taking differently: Take 500 mg by mouth every six (6) hours as needed.)    insulin aspart protamine/insulin aspart (NOVOLOG MIX 70/30) 100 unit/mL (70-30) inpn by SubCUTAneous route three (3) times daily. PT TAKES ON SS, BUT SHE CANNOT TELL WHAT THE SCALE IS-STATES IT'S BASED ON WHAT SHE IS GOING TO EAT. minocycline (MINOCIN, DYNACIN) 100 mg capsule Take  by mouth daily as needed. pantoprazole (PROTONIX) 40 mg tablet Take 40 mg by mouth daily. olopatadine (PATANOL) 0.1 % ophthalmic solution Administer 2 Drops to both eyes two (2) times a day. peg 400-propylene glycol 0.4-0.3 % drpg Apply  to eye two (2) times a day. cycloSPORINE (RESTASIS) 0.05 % dpet Administer 1 Drop to both eyes as needed. fluticasone (FLONASE) 50 mcg/actuation nasal spray 2 Sprays by Both Nostrils route two (2) times a day. No current facility-administered medications for this visit.        Dain Lucero MD  [facility] heart and Vascular [place]  [address] [facility] [address]  [facility], [address]

## 2022-08-26 NOTE — LETTER
Patient:  Roddy Roth   YOB: 1960  Date of Visit: 8/26/2022      Dear Nikki Rodriguez MD  [address] [place] [id number]  Via Fax: [phone]:      Ms. Bouchra Preciado is a 59 yo F seen in the past by Dr. Mari Coombs and [facility], coronary artery disease with recent cardiac catheterization with Dr. Stephen Stephenson on 11/19/2021 demonstrating ostial/proximal LAD, mid LAD and significant mid circumflex lesions treated with two drug eluting stents to the LAD and one drug eluting stent to the circumflex. 3/2022 stress test was normal.    Since her last visit, she did end up seeing GI and says they did evaluation a few weeks ago, but she does not have the results yet. Over a week ago, she did have approximately three days where she felt headaches, tired and her blood pressure was up. This ended up resolving on its own. She has been eating and drinking okay. She denies any exertional chest pain and her breathing has been stable. She is compensated on exam with clear lungs and no lower extremity edema. Assessment and Plan:   1. Chest pain. Unclear etiology and she had recent GI workup. Her cardiac testing was normal, including a stress test and her symptoms are nonexertional.  No additional cardiac evaluation is indicated at this time and will have her follow back in six months. She did have recent GI evaluation. 2. Coronary artery disease, status post stents in 11/2021. She is now greater than six months. We will have her continue her current cardiac regimen. 3. Status post knee surgery in 2015. 4. Essential hypertension. Blood pressure is controlled. 5. Type 2 diabetes. 6. Dyslipidemia. 7. Gastroesophageal reflux disease. If you have questions, please do not hesitate to call me.      Sincerely,      Geeta Mansfield MD

## 2022-09-22 ENCOUNTER — HOSPITAL ENCOUNTER (OUTPATIENT)
Age: 62
Setting detail: OUTPATIENT SURGERY
Discharge: HOME | End: 2022-09-22
Attending: INTERNAL MEDICINE | Admitting: INTERNAL MEDICINE
Payer: MEDICARE

## 2022-09-22 ENCOUNTER — ANESTHESIA (OUTPATIENT)
Dept: ENDOSCOPY | Age: 62
End: 2022-09-22
Payer: MEDICARE

## 2022-09-22 ENCOUNTER — ANESTHESIA EVENT (OUTPATIENT)
Dept: ENDOSCOPY | Age: 62
End: 2022-09-22
Payer: MEDICARE

## 2022-09-22 VITALS
SYSTOLIC BLOOD PRESSURE: 130 MMHG | WEIGHT: 174.3 LBS | OXYGEN SATURATION: 98 % | TEMPERATURE: 98 F | BODY MASS INDEX: 30.88 KG/M2 | DIASTOLIC BLOOD PRESSURE: 62 MMHG | RESPIRATION RATE: 16 BRPM | HEART RATE: 68 BPM | HEIGHT: 63 IN

## 2022-09-22 LAB
GLUCOSE BLD STRIP.AUTO-MCNC: 190 MG/DL (ref 65–117)
SERVICE CMNT-IMP: ABNORMAL

## 2022-09-22 PROCEDURE — 74011250636 HC RX REV CODE- 250/636: Performed by: NURSE ANESTHETIST, CERTIFIED REGISTERED

## 2022-09-22 PROCEDURE — 76060000032 HC ANESTHESIA 0.5 TO 1 HR: Performed by: INTERNAL MEDICINE

## 2022-09-22 PROCEDURE — 82962 GLUCOSE BLOOD TEST: CPT

## 2022-09-22 PROCEDURE — 74011000250 HC RX REV CODE- 250: Performed by: NURSE ANESTHETIST, CERTIFIED REGISTERED

## 2022-09-22 PROCEDURE — 76040000007: Performed by: INTERNAL MEDICINE

## 2022-09-22 PROCEDURE — 77030019988 HC FCPS ENDOSC DISP BSC -B: Performed by: INTERNAL MEDICINE

## 2022-09-22 PROCEDURE — 88305 TISSUE EXAM BY PATHOLOGIST: CPT

## 2022-09-22 PROCEDURE — 2709999900 HC NON-CHARGEABLE SUPPLY: Performed by: INTERNAL MEDICINE

## 2022-09-22 PROCEDURE — 77030018712 HC DEV BLLN INFL BSC -B: Performed by: INTERNAL MEDICINE

## 2022-09-22 PROCEDURE — C1726 CATH, BAL DIL, NON-VASCULAR: HCPCS | Performed by: INTERNAL MEDICINE

## 2022-09-22 PROCEDURE — 74011250636 HC RX REV CODE- 250/636: Performed by: INTERNAL MEDICINE

## 2022-09-22 RX ORDER — SODIUM CHLORIDE 9 MG/ML
125 INJECTION, SOLUTION INTRAVENOUS CONTINUOUS
Status: DISCONTINUED | OUTPATIENT
Start: 2022-09-22 | End: 2022-09-22 | Stop reason: HOSPADM

## 2022-09-22 RX ORDER — MIDAZOLAM HYDROCHLORIDE 1 MG/ML
.25-5 INJECTION, SOLUTION INTRAMUSCULAR; INTRAVENOUS
Status: DISCONTINUED | OUTPATIENT
Start: 2022-09-22 | End: 2022-09-22 | Stop reason: HOSPADM

## 2022-09-22 RX ORDER — DIPHENHYDRAMINE HYDROCHLORIDE 50 MG/ML
50 INJECTION, SOLUTION INTRAMUSCULAR; INTRAVENOUS ONCE
Status: DISCONTINUED | OUTPATIENT
Start: 2022-09-22 | End: 2022-09-22 | Stop reason: HOSPADM

## 2022-09-22 RX ORDER — LIDOCAINE HYDROCHLORIDE 20 MG/ML
INJECTION, SOLUTION EPIDURAL; INFILTRATION; INTRACAUDAL; PERINEURAL AS NEEDED
Status: DISCONTINUED | OUTPATIENT
Start: 2022-09-22 | End: 2022-09-22 | Stop reason: HOSPADM

## 2022-09-22 RX ORDER — SODIUM CHLORIDE 9 MG/ML
50 INJECTION, SOLUTION INTRAVENOUS CONTINUOUS
Status: DISCONTINUED | OUTPATIENT
Start: 2022-09-22 | End: 2022-09-22 | Stop reason: HOSPADM

## 2022-09-22 RX ORDER — ATROPINE SULFATE 0.1 MG/ML
0.5 INJECTION INTRAVENOUS
Status: DISCONTINUED | OUTPATIENT
Start: 2022-09-22 | End: 2022-09-22 | Stop reason: HOSPADM

## 2022-09-22 RX ORDER — FLUMAZENIL 0.1 MG/ML
0.2 INJECTION INTRAVENOUS
Status: DISCONTINUED | OUTPATIENT
Start: 2022-09-22 | End: 2022-09-22 | Stop reason: HOSPADM

## 2022-09-22 RX ORDER — PROPOFOL 10 MG/ML
INJECTION, EMULSION INTRAVENOUS AS NEEDED
Status: DISCONTINUED | OUTPATIENT
Start: 2022-09-22 | End: 2022-09-22 | Stop reason: HOSPADM

## 2022-09-22 RX ORDER — NALOXONE HYDROCHLORIDE 0.4 MG/ML
0.4 INJECTION, SOLUTION INTRAMUSCULAR; INTRAVENOUS; SUBCUTANEOUS
Status: DISCONTINUED | OUTPATIENT
Start: 2022-09-22 | End: 2022-09-22 | Stop reason: HOSPADM

## 2022-09-22 RX ORDER — DEXTROMETHORPHAN/PSEUDOEPHED 2.5-7.5/.8
1.2 DROPS ORAL
Status: DISCONTINUED | OUTPATIENT
Start: 2022-09-22 | End: 2022-09-22 | Stop reason: HOSPADM

## 2022-09-22 RX ORDER — EPINEPHRINE 0.1 MG/ML
1 INJECTION INTRACARDIAC; INTRAVENOUS
Status: DISCONTINUED | OUTPATIENT
Start: 2022-09-22 | End: 2022-09-22 | Stop reason: HOSPADM

## 2022-09-22 RX ADMIN — SODIUM CHLORIDE 50 ML/HR: 9 INJECTION, SOLUTION INTRAVENOUS at 11:15

## 2022-09-22 RX ADMIN — PROPOFOL 70 MG: 10 INJECTION, EMULSION INTRAVENOUS at 11:55

## 2022-09-22 RX ADMIN — PROPOFOL 50 MG: 10 INJECTION, EMULSION INTRAVENOUS at 12:04

## 2022-09-22 RX ADMIN — PROPOFOL 50 MG: 10 INJECTION, EMULSION INTRAVENOUS at 12:09

## 2022-09-22 RX ADMIN — PROPOFOL 25 MG: 10 INJECTION, EMULSION INTRAVENOUS at 12:06

## 2022-09-22 RX ADMIN — PROPOFOL 25 MG: 10 INJECTION, EMULSION INTRAVENOUS at 11:56

## 2022-09-22 RX ADMIN — PROPOFOL 25 MG: 10 INJECTION, EMULSION INTRAVENOUS at 12:02

## 2022-09-22 RX ADMIN — PROPOFOL 25 MG: 10 INJECTION, EMULSION INTRAVENOUS at 12:00

## 2022-09-22 RX ADMIN — PROPOFOL 30 MG: 10 INJECTION, EMULSION INTRAVENOUS at 11:58

## 2022-09-22 RX ADMIN — LIDOCAINE HYDROCHLORIDE 100 MG: 20 INJECTION, SOLUTION EPIDURAL; INFILTRATION; INTRACAUDAL; PERINEURAL at 11:55

## 2022-09-22 RX ADMIN — PROPOFOL 30 MG: 10 INJECTION, EMULSION INTRAVENOUS at 12:12

## 2022-09-22 NOTE — PROGRESS NOTES
Spoke to Dr. Lisseth Renae regarding patients eliquis and plavix. Per MD ok to resume meds in 3 days, included in dc instructions. Written and verbal discharge instructions reviewed with patient and , all questions answered and verbalized understanding. All personal belongings with patient at time of discharge.

## 2022-09-22 NOTE — PROGRESS NOTES
TRANSFER - IN REPORT:    Verbal report received from Mandi(name) on Juma Brewerton  being received from endo(unit) for routine progression of care      Report consisted of patients Situation, Background, Assessment and   Recommendations(SBAR). Information from the following report(s) SBAR, Procedure Summary, and MAR was reviewed with the receiving nurse. Opportunity for questions and clarification was provided. Assessment completed upon patients arrival to unit and care assumed.

## 2022-09-22 NOTE — H&P
[facility]  Outpatient History and Physical    Patient: [facility]    Physician: Katie Anglin MD    Vital Signs: Blood pressure 125/70, pulse 74, temperature 97.7 °F (36.5 °C), resp. rate 18, height 5' 3\" (1.6 m), weight 79.1 kg (174 lb 4.8 oz), SpO2 99 %, not currently breastfeeding. Allergies:    Allergies   Allergen Reactions    Bactrim [Sulfamethoprim Ds] Rash and Nausea and Vomiting    Other Medication Rash     SURGICAL GLUE    Oxycodone Nausea and Vomiting       Chief Complaint: heartburn, dysphagia, epigastric pain, here for EGD to assess    History:  Past Medical History:   Diagnosis Date    Arthritis     Asthma     Atrial fibrillation ([address]     Diabetes ([address]     GERD (gastroesophageal reflux disease)     Glaucoma     High cholesterol     Hypertension     Long term current use of anticoagulant therapy     Nausea & vomiting     Psychiatric disorder     DEPRESSION      Past Surgical History:   Procedure Laterality Date    HX  SECTION  6054,2411    HX GYN  1986    left ovarian cyst    HX GYN  1987    cyst on tube    HX GYN  1988    cyst removed    HX HEART CATHETERIZATION      HX HEENT      GLAUCOMA SURGERY    HX HEENT      TMJ    HX ORTHOPAEDIC  2008    RIGHT ACL    HX ORTHOPAEDIC  2010    RIGHT TKR    HX ORTHOPAEDIC  2012    CARPAL TUNNEL    HX ORTHOPAEDIC  2013    CARPAL TUNNEL    HX ORTHOPAEDIC  2014    RIGHT TKR    CA BREAST SURGERY PROCEDURE UNLISTED      KEERTHI TUMOR REMOVED-BENIGN      Social History     Socioeconomic History    Marital status: LEGALLY    Tobacco Use    Smoking status: Former     Packs/day: 0.25     Years: 10.00     Pack years: 2.50     Types: Cigarettes     Quit date: 12/15/2003     Years since quittin.7    Smokeless tobacco: Never   Substance and Sexual Activity    Alcohol use: No    Drug use: No      Family History   Problem Relation Age of Onset    Cancer Father         PROSTATE    Heart Attack Brother Anesth Problems Neg Hx        Medications:   Prior to Admission medications    Medication Sig Start Date End Date Taking? Authorizing Provider   clopidogreL (Plavix) 75 mg tab Take 1 Tablet by mouth daily for 360 days. Indications: a sudden worsening of angina called acute coronary syndrome 8/26/22 8/21/23 Yes Simone Desai MD   prednisoln si-[id number]-[id number] 1-0.5-0.075 % drop as directed. 4/4/22  Yes Provider, Historical   apixaban (Eliquis) 5 mg tablet Take 1 Tablet by mouth two (2) times a day. 5/13/22  Yes Simone Desai MD   metoprolol succinate (TOPROL-XL) 50 mg XL tablet Take 1 Tablet by mouth daily. 5/13/22  Yes Simone Desai MD   enalapril (VASOTEC) 10 mg tablet Take 1 Tablet by mouth daily. 5/13/22  Yes Simone Desai MD   atorvastatin (LIPITOR) 40 mg tablet Take 1 Tablet by mouth nightly. 5/13/22  Yes Simone Desai MD   diclofenac (VOLTAREN) 1 % gel APPLY 4 GRAMS TP AFFECTED AREAS ON FEET 4 TIMES A DAY 1/12/22  Yes Provider, Historical   meloxicam (MOBIC) 15 mg tablet TAKE 1 TABLET BY MOUTH EVERY DAY WITH FOOD 1/12/22  Yes Provider, Historical   nitroglycerin (NITROSTAT) 0.4 mg SL tablet DISSOLVE 1 TABLET UNDER THE TONGUE EVERY 5 MINUTES AS NEEDED FOR CHEST PAIN FOR UP TO 3 DOSES.  2/20/22  Yes NAOMI Bazzi Ellipta 200-25 mcg/dose inhaler  10/30/21  Yes Provider, Historical   clindamycin (CLINDAGEL) 1 % topical gel  11/15/21  Yes Provider, Historical   amoxicillin (AMOXIL) 500 mg capsule  11/22/21  Yes Provider, Historical   triamcinolone acetonide (KENALOG) 0.1 % topical cream APPLY TO AFFECTED AREA TWICE A DAY 10/2/21  Yes Provider, Historical   dorzolamide-timoloL (COSOPT) 22.3-6.8 mg/mL ophthalmic solution  10/13/21  Yes Provider, Historical   clotrimazole-betamethasone (LOTRISONE) topical cream  10/20/21  Yes Provider, Historical   azelastine (ASTELIN) 137 mcg (0.1 %) nasal spray    Yes Provider, Historical   brimonidine (ALPHAGAN) 0.15 % ophthalmic solution  10/28/21  Yes Provider, Historical   montelukast (SINGULAIR) 10 mg tablet Take 10 mg by mouth daily. Yes Provider, Historical   dapagliflozin (FARXIGA) 10 mg tab tablet Take  by mouth daily. Yes Provider, Historical   insulin aspart protamine/insulin aspart (NOVOLOG MIX 70/30) 100 unit/mL (70-30) inpn by SubCUTAneous route three (3) times daily. PT TAKES ON SS, BUT SHE CANNOT TELL WHAT THE SCALE IS-STATES IT'S BASED ON WHAT SHE IS GOING TO EAT. Yes Provider, Historical   minocycline (MINOCIN, DYNACIN) 100 mg capsule Take  by mouth daily as needed. Yes Provider, Historical   pantoprazole (PROTONIX) 40 mg tablet Take 40 mg by mouth daily. Yes Provider, Historical   olopatadine (PATANOL) 0.1 % ophthalmic solution Administer 2 Drops to both eyes two (2) times a day. Yes Provider, Historical   peg 400-propylene glycol 0.4-0.3 % drpg Apply  to eye two (2) times a day. Yes Provider, Historical   cycloSPORINE (RESTASIS) 0.05 % dpet Administer 1 Drop to both eyes as needed. Yes Provider, Historical   fluticasone (FLONASE) 50 mcg/actuation nasal spray 2 Sprays by Both Nostrils route two (2) times a day. Yes Provider, Historical   fluconazole (DIFLUCAN) 100 mg tablet Take 100 mg by mouth every seven (7) days. FDA advises cautious prescribing of oral fluconazole in pregnancy. Patient not taking: Reported on 9/22/2022    Provider, Historical   acetaminophen (TYLENOL) 500 mg tablet Take 1 Tab by mouth every four (4) hours (while awake). Patient not taking: Reported on 9/22/2022 12/28/15   Julien Morse MD       Physical Exam:   General: alert, no distress   HEENT: Head: Normocephalic, no lesions, without obvious abnormality.    Heart: regular rate and rhythm, S1, S2 normal, no murmur, click, rub or gallop   Lungs: chest clear, no wheezing, rales, normal symmetric air entry   Abdominal: Bowel sounds are normal, liver is not enlarged, spleen is not enlarged   Neurological: Grossly normal   Extremities: extremities normal, atraumatic, no cyanosis or edema     Plan of Care/Planned Procedure: EGD    The heart, lungs and mental status were satisfactory for the administration of MAC sedation and for the procedure. Informed consent was obtained for the procedure, including sedation. Risks of perforation, hemorrhage, adverse drug reaction, and aspiration were discussed. The risks, benefits and alternatives were again reiterated to the patient to include the risk of infection, bleeding, medication reaction, aspiration, perforation which could require immediate surgery, cardiopulmonary complication, issues with anesthesia and death. The patient was counseled at length about the risks of lisa Covid-19 in the cesar-operative and post-operative states including the recovery window of their procedure. The patient was made aware that lisa Covid-19 after a surgical procedure may worsen their prognosis for recovering from the virus and lend to a higher morbidity and or mortality risk. The patient was given the options of postponing their procedure. All of the risks, benefits, and alternatives were discussed. The patient does  wish to proceed with the procedure.

## 2022-09-22 NOTE — DISCHARGE INSTRUCTIONS
Radha Perry  [id number]  1960    It was my pleasure seeing you for your procedure. You will also receive a summary report with the findings from this procedure and any further recommendations. If you had polyps removed or biopsies taken during your procedure, you will receive a separate letter from me within the next 2 weeks. If you don't receive this letter or if you have any questions, please call my office [phone]. Please take note of the post procedure instructions listed below. Best Wishes,    Dr. Olena Lira    These instructions give you information on caring for yourself after your procedure. Call your doctor if you have any problems or questions after your procedure. HOME CARE  Walk if you have belly cramping or gas. Walking will help get rid of the air and reduce the bloated feeling in your belly (abdomen). Your IV site (where you received drugs) may be tender to touch. Place warm towels on the site; keep your arm up on two pillows if you have any swelling or soreness in the area. You may shower. ACTIVITY:  Take frequent rest periods and move at a slower pace for the next 24 hours. .  You may resume your regular activity tomorrow if you are feeling back to normal.  Do not drive or ride a bicycle for at least 24 hours (because of the medicine (anesthesia) used during the test). Do not sign any important legal documents or use or operate any machinery for 24 hours  Do not take sleeping medicines/nerve drugs for 24 hours unless the doctor tells you. You can return to work/school tomorrow unless otherwise instructed. NUTRITION:  Drink plenty of fluids to keep your pee (urine) clear or pale yellow  Begin with a light meal and progress to your normal diet. Heavy or fried foods are harder to digest and may make you feel sick to your stomach (nauseated).   Once you are feeling back to normal, you may resume your normal diet as instructed by your doctor. Avoid alcoholic beverages for 24 hours or as instructed. IF YOU HAD BIOPSIES TAKEN OR POLYPS REMOVED DURING THE PROCEDURE:  For the next 7 days, avoid all non-steroidal antiinflammatory medications such as Ibuprofen, Motrin, Advil, Alleve, Meliza-seltzer, Goody's powder, BC powder. If you do not have an heart condition that requires you to take a daily aspirin, you should avoid taking aspirin for 7 days. Eat a soft diet for 24 hours. Monitor your stools for any blood or dark black, tar-like, stools as this may be a sign of bleeding and if you see any blood, notify your doctor immediately. GET HELP RIGHT AWAY AND SEEK IMMEDIATE MEDICAL CARE IF:  You have more than a spotting of blood in your stool. You pass clumps of tissue (blood clots) or fill the toilet with blood. Your belly is painfully swollen or puffy (abdominal distention). You throw up (vomit). You have a fever. You have redness, pain or swelling at the IV site that last greater than two days. You have abdominal pain or discomfort that is severe or gets worse throughout the day. Post-procedure diagnosis:  hiatal hernia, gastritis, Schatzki's Ring    Post-procedure recommendations:  -Continue acid suppression. , -Await pathology. - discussed w/ Chey        Learning About Coronavirus (JFLJD-17)  Coronavirus (COVID-19): Overview  What is coronavirus (WBRWS-35)? The coronavirus disease (COVID-19) is caused by a virus. It is an illness that was first found in [place], [place], in December 2019. It has since spread worldwide. The virus can cause fever, cough, and trouble breathing. In severe cases, it can cause pneumonia and make it hard to breathe without help. It can cause death. Coronaviruses are a large group of viruses. They cause the common cold. They also cause more serious illnesses like Middle East respiratory syndrome (MERS) and severe acute respiratory syndrome (SARS).  COVID-19 is caused by a novel coronavirus. That means it's a new type that has not been seen in people before. This virus spreads person-to-person through droplets from coughing and sneezing. It can also spread when you are close to someone who is infected. And it can spread when you touch something that has the virus on it, such as a doorknob or a tabletop. What can you do to protect yourself from coronavirus (COVID-19)? The best way to protect yourself from getting sick is to: Avoid areas where there is an outbreak. Avoid contact with people who may be infected. Wash your hands often with soap or alcohol-based hand sanitizers. Avoid crowds and try to stay at least 6 feet away from other people. Wash your hands often, especially after you cough or sneeze. Use soap and water, and scrub for at least 20 seconds. If soap and water aren't available, use an alcohol-based hand . Avoid touching your mouth, nose, and eyes. What can you do to avoid spreading the virus to others? To help avoid spreading the virus to others:  Cover your mouth with a tissue when you cough or sneeze. Then throw the tissue in the trash. Use a disinfectant to clean things that you touch often. Stay home if you are sick or have been exposed to the virus. Don't go to school, work, or public areas. And don't use public transportation. If you are sick:  Leave your home only if you need to get medical care. But call the doctor's office first so they know you're coming. And wear a face mask, if you have one. If you have a face mask, wear it whenever you're around other people. It can help stop the spread of the virus when you cough or sneeze. Clean and disinfect your home every day. Use household  and disinfectant wipes or sprays. Take special care to clean things that you grab with your hands. These include doorknobs, remote controls, phones, and handles on your refrigerator and microwave.  And don't forget countertops, tabletops, bathrooms, and computer keyboards. When to call for help  Call 911 anytime you think you may need emergency care. For example, call if:  You have severe trouble breathing. (You can't talk at all.)  You have constant chest pain or pressure. You are severely dizzy or lightheaded. You are confused or can't think clearly. Your face and lips have a blue color. You pass out (lose consciousness) or are very hard to wake up. Call your doctor now if you develop symptoms such as:  Shortness of breath. Fever. Cough. If you need to get care, call ahead to the doctor's office for instructions before you go. Make sure you wear a face mask, if you have one, to prevent exposing other people to the virus. Where can you get the latest information? The following health organizations are tracking and studying this virus. Their websites contain the most up-to-date information. Lew [place]ward also learn what to do if you think you may have been exposed to the virus. U.S. Centers for Disease Control and Prevention (CDC): The CDC provides updated news about the disease and travel advice. The website also tells you how to prevent the spread of infection. www.cdc.gov  World Health Organization [facility]): WHO offers information about the virus outbreaks. WHO also has travel advice. www.who.int  Current as of: April 1, 2020               Content Version: 12.4  © 9512-4233 [employer], [employer]orporated. Care instructions adapted under license by your healthcare professional. If you have questions about a medical condition or this instruction, always ask your healthcare professional. [place] [address] any warranty or liability for your use of this information.              Patient Education on Sedation / Analgesia Administered for Procedure      For 24 hours after general anesthesia or intravenous analgesia / sedation:  Have someone responsible help you with your care  Limit your activities  Do not drive and operate hazardous machinery  Do not make important personal, legal or business decisions  Do not drink alcoholic beverages  If you have not urinated within 8 hours after discharge, please contact your physician  Resume your medications unless otherwise instructed    For 24 hours after general anesthesia or intravenous analgesia / sedation  you may experience:  Drowsiness, dizziness, sleepiness, or confusion  Difficulty remembering or delayed reaction times  Difficulty with your balance, especially while walking, move slowly and carefully, do not make sudden position changes  Difficulty focusing or blurred vision    You may not be aware of slight changes in your behavior and/or your reaction time because of the medication used during and after your procedure.     Report the following to your physician:  Excessive pain, swelling, redness or odor of or around the surgical area  Temperature over 100.5  Nausea and vomiting lasting longer than 4 hours or if unable to take medications  Any signs of decreased circulation or nerve impairment to extremity: change in color, persistent numbness, tingling, coldness or increase pain  Any questions or concerns    IF YOU REPORT TO AN EMERGENCY ROOM, DOCTOR'S OFFICE OR HOSPITAL WITHIN 24 HOURS AFTER YOUR PROCEDURE, BRING THIS SHEET AND YOUR AFTER VISIT SUMMARY WITH YOU AND GIVE IT TO THE PHYSICIAN OR NURSE ATTENDING YOU.

## 2022-09-22 NOTE — ANESTHESIA PREPROCEDURE EVALUATION
Relevant Problems   CARDIOVASCULAR   (+) Coronary artery disease involving native coronary artery of native heart without angina pectoris   (+) HTN (hypertension)   (+) Paroxysmal atrial fibrillation (HCC)      ENDOCRINE   (+) Type 2 diabetes mellitus       Anesthetic History     PONV          Review of Systems / Medical History  Patient summary reviewed, nursing notes reviewed and pertinent labs reviewed    Pulmonary            Asthma        Neuro/Psych         Psychiatric history     Cardiovascular    Hypertension        Dysrhythmias : atrial fibrillation  CAD    Exercise tolerance: >4 METS     GI/Hepatic/Renal     GERD           Endo/Other    Diabetes: type 2    Arthritis     Other Findings              Physical Exam    Airway  Mallampati: II  TM Distance: 4 - 6 cm  Neck ROM: normal range of motion   Mouth opening: Normal     Cardiovascular  Regular rate and rhythm,  S1 and S2 normal,  no murmur, click, rub, or gallop  Rhythm: regular  Rate: normal         Dental  No notable dental hx       Pulmonary  Breath sounds clear to auscultation               Abdominal  GI exam deferred       Other Findings            Anesthetic Plan    ASA: 3  Anesthesia type: MAC          Induction: Intravenous  Anesthetic plan and risks discussed with: Patient

## 2022-09-22 NOTE — PROCEDURES
NAME:  Ayana Thorpe   :   1960   MRN:   [id number]     Date/Time:  2022 12:15 PM    Esophagogastroduodenoscopy (EGD) Procedure Note    : Ashleigh Vernon MD    Staff: Endoscopy Technician-1: Elyssa Daniels  Endoscopy RN-1: Viktoria Vila RN     Referring Provider:  Sony Mcgee MD    Anethesia/Sedation:  MAC anesthesia Propofol    Preoperative diagnosis: DYSPHAGIA/HEARTBURN/EPIGASTRIC PAIN    Postoperative diagnosis: * No post-op diagnosis entered *    Procedure Details     After infom consent was obtained for the procedure, with all risks and benefits of procedure explained the patient was taken to the endoscopy suite and placed in the left lateral decubitus position. Following sequential administration of sedation as per above, the [id number] gastroscope was inserted into the mouth and advanced under direct vision to second portion of the duodenum. A careful inspection was made as the gastroscope was withdrawn, including a retroflexed view of the proximal stomach; findings and interventions are described below. Findings:  Esophagus:Normal proximal, mid, distal esophageal mucosa aside from mild glycogenic acanthosis. Cold forceps biopsies obtained distal and proximal separately minimal bleeding. EGJ at 36cm and irregular with a hiatal hernia sliding extending 3cm. GEFV hill-3/4 (hard to tell, poor insufflation of stomach to get a good view). There was a mild Schatzki ring at the EGJ. Decision was made to dilate and preparations were made. A 18-20mm TTS CRE fixed wire balloon was placed in the stomach and pulled across the EGJ. Incremental dilations performed from 18 to final dilation of 20mm, first resistance appreciated at 20mm and was held for 60 seconds, with balloon deflation to inspect the mucosa after each dilation.  After final dilation, final inspection revealed a NO change in the widely patent ring, and no sign of perforation and neck exam without crepitus. Stomach: Mild erythema and congestion throughout stomach, with a demarcation line in the antrum. Vanda protocol biopsies performed antrum/incisura; body/cardia cold forceps separately minimal bleeding to r/o H pylori and gastric intestinal metaplasia. Duodenum/jejunum: normal mucosa, biopsied cold forceps minimal bleeding to r/o sprue           EBL: None    Complications:   None; patient tolerated the procedure well. Impression:    See Postoperative diagnosis above    Recommendations:  -Continue acid suppression. , -Await pathology.   - discussed w/ Chey    Discharge disposition:  Home in the company of  when able to ambulate    Yen Aguirre MD

## 2022-11-04 NOTE — TELEPHONE ENCOUNTER
----- Message from David Metzger NP sent at 11/9/2021  8:52 AM EST -----  Please let her know that her stress test is abnormal suggesting a possible blocked artery and I recommend that she have a cardiac cath with Dr. Brigitte Jon
2:02 PM   Attempted to reach patient by telephone. A message was left for return call.
Patient returned call. Two patient indentifiers verified. Pt was scheduled for [facility] on 11/19 @ 8:15 am. Pt will get covid tested on 11/16. Pt will get lab work at the same time. Pt to hold Eliquis x 2 days prior. Pt verbalized understanding and denies any further questions at this time.      Future Appointments   Date Time Provider Pablito Shah   11/24/2021  1:20 PM MD ORI Pacheco BS AMB   3/15/2022  3:00 PM MD ORI Sanchez BS AMB
per mother patient has been c/o left ear pain x 1 week. seen by PMD and told there was nothing there. no pain medication given PTA. no fevers. normal PO intake, normal UO.

## 2022-11-25 ENCOUNTER — TELEPHONE (OUTPATIENT)
Dept: CARDIOLOGY CLINIC | Age: 62
End: 2022-11-25

## 2022-11-25 NOTE — TELEPHONE ENCOUNTER
Verified Patient with two identifiers. Patient would like to know if it is okay for her to stop Plavix since her Cath with [facility] was done a year ago on November 19, 2021. Please advise.

## 2022-11-25 NOTE — TELEPHONE ENCOUNTER
Pt questioned if she should stop taking her Plavix. Pt would like to speak to the nurse in reference to her medication of clopidogreL (Plavix) 75 mg tab.        Pt #   [phone]

## 2022-11-28 ENCOUNTER — TRANSCRIBE ORDER (OUTPATIENT)
Dept: SCHEDULING | Age: 62
End: 2022-11-28

## 2022-11-28 DIAGNOSIS — R10.11 RUQ PAIN: Primary | ICD-10-CM

## 2022-11-28 RX ORDER — ASPIRIN 81 MG/1
81 TABLET ORAL DAILY
Qty: 90 TABLET | Refills: 3 | Status: SHIPPED | OUTPATIENT
Start: 2022-11-28

## 2022-11-28 NOTE — TELEPHONE ENCOUNTER
Per Dr. Jacklyn Ramirez. Yes she can stop this. Make sure she's still on aspirin 81 mg once daily. thx       Verified Patient with two identifiers  Spoke with patient regarding results and recommendations. Aspirin 81 mg Rx sent to pharmacy on file. Patient voiced understanding.

## 2022-11-29 ENCOUNTER — HOSPITAL ENCOUNTER (OUTPATIENT)
Dept: ULTRASOUND IMAGING | Age: 62
Discharge: HOME | End: 2022-11-29
Payer: MEDICARE

## 2022-11-29 DIAGNOSIS — R10.11 RUQ PAIN: ICD-10-CM

## 2022-11-29 PROCEDURE — 76705 ECHO EXAM OF ABDOMEN: CPT

## 2022-12-05 ENCOUNTER — TRANSCRIBE ORDER (OUTPATIENT)
Dept: SCHEDULING | Age: 62
End: 2022-12-05

## 2022-12-05 DIAGNOSIS — R10.13 EPIGASTRIC PAIN: Primary | ICD-10-CM

## 2022-12-05 DIAGNOSIS — R11.2 NAUSEA WITH VOMITING: ICD-10-CM

## 2022-12-09 ENCOUNTER — OFFICE VISIT (OUTPATIENT)
Dept: ORTHOPEDIC SURGERY | Age: 62
End: 2022-12-09
Payer: MEDICARE

## 2022-12-09 VITALS — HEIGHT: 63 IN | WEIGHT: 174 LBS | BODY MASS INDEX: 30.83 KG/M2

## 2022-12-09 DIAGNOSIS — M17.12 PRIMARY OSTEOARTHRITIS OF LEFT KNEE: Primary | ICD-10-CM

## 2022-12-09 DIAGNOSIS — Z96.651 STATUS POST REVISION OF TOTAL KNEE REPLACEMENT, RIGHT: ICD-10-CM

## 2022-12-09 RX ORDER — BUPIVACAINE HYDROCHLORIDE 7.5 MG/ML
5 INJECTION, SOLUTION EPIDURAL; RETROBULBAR ONCE
Status: COMPLETED | OUTPATIENT
Start: 2022-12-09 | End: 2022-12-09

## 2022-12-09 RX ORDER — METHYLPREDNISOLONE ACETATE 80 MG/ML
40 INJECTION, SUSPENSION INTRA-ARTICULAR; INTRALESIONAL; INTRAMUSCULAR; SOFT TISSUE ONCE
Status: COMPLETED | OUTPATIENT
Start: 2022-12-09 | End: 2022-12-09

## 2022-12-09 RX ADMIN — BUPIVACAINE HYDROCHLORIDE 37.5 MG: 7.5 INJECTION, SOLUTION EPIDURAL; RETROBULBAR at 16:34

## 2022-12-09 RX ADMIN — METHYLPREDNISOLONE ACETATE 40 MG: 80 INJECTION, SUSPENSION INTRA-ARTICULAR; INTRALESIONAL; INTRAMUSCULAR; SOFT TISSUE at 16:34

## 2022-12-09 NOTE — LETTER
12/9/2022    Patient: Radha Parish   YOB: 1960   Date of Visit: 12/9/2022     Mckay Red MD  [address] [address]  Via Fax: [phone]    Dear Mckay Red MD,      Thank you for referring Ms. Radha Parish to [facility] for evaluation. My notes for this consultation are attached. If you have questions, please do not hesitate to call me. I look forward to following your patient along with you.       Sincerely,    Aracely Avendano MD

## 2022-12-09 NOTE — PROGRESS NOTES
Bret Mcmillan (: 1960) is a 60 y.o. female, patient, here for evaluation of the following chief complaint(s):  Knee Pain (Left )       SUBJECTIVE/OBJECTIVE:  Bret Mcmillan presents today for evaluation of bilateral knee pain. She underwent primary total knee replacement by Dr. Jarod Gann many years ago, with revision by him in  for what sounds like polyethylene exchange. I revised her again in . She went on to develop arthrofibrosis and has had some chronic pain in that knee ever since. Low-grade aching at rest.  Pain with activity. Symptoms have been unchanged for years. Denies subjective instability. Waxing and waning pain in the left knee. Anteromedial.  Activity related. No mechanical symptoms. Aches at rest.  No real treatment for the left knee symptoms. PHYSICAL EXAM:  Vitals: Ht 5' 3\" (1.6 m)   Wt 174 lb (78.9 kg)   BMI 30.82 kg/m²   Body mass index is 30.82 kg/m². 60y.o. year old F, no distress. Antalgic gait. Pain-free motion both hips. Negative Stinchfield. Right total knee is in neutral alignment. Healed midline anterior scar. No warmth swelling or effusion. Diffuse soft tissue tenderness. Lacks about 15 degrees of extension, with flexion only to about 45 or 55 degrees. No instability. Left knee neutral alignment. No effusion. Large varicose veins over the knee. There is a tender vein midline anteriorly, but she also has medial joint line tenderness. No knee effusion. Full extension with flexion to about 115 degrees. No instability. Symmetrical palpable distal pulses. No gross motor or sensory deficits in lower extremities. No distal edema.       IMAGING:  Radiographs: XR Results (most recent):  Results from Appointment encounter on 22    XR KNEES BI MIN 4 V    Narrative  For x-rays of both knees including AP and PA flexion, lateral, sunrise demonstrate satisfactory position and alignment of revision Sigma posterior stabilized total knee components in the right knee. No evidence of loosening. Patella tracks centrally. Mild to moderate osteoarthritis in the left knee with moderate loss of medial compartment joint space and mild loss of patellofemoral space. ASSESSMENT/PLAN:  1. Primary osteoarthritis of left knee  -     XR KNEES BI MIN 4 V; Future  -     bupivacaine (PF) (MARCAINE) 0.75 % (7.5 mg/mL) injection 37.5 mg; 37.5 mg (5 mL), Intra artICUlar, ONCE, 1 dose, On Fri 12/9/22 at 1700  -     methylPREDNISolone acetate (DEPO-MEDROL) 80 mg/mL injection 40 mg; 40 mg, Intra artICUlar, ONCE, 1 dose, On Fri 12/9/22 at 1700  2. Status post revision of total knee replacement, right  -     XR KNEES BI MIN 4 V; Future    Arthrofibrosis, revision right total knee replacement. She was reassured. No concerning findings on physical and x-ray exam.  No changes over the long-term. Continue observation. Mild to moderate medial compartment arthritis in the left knee. Discussed conservative treatment measures including nonsteroidal anti-inflammatories, physical therapy, steroid injections. She went through therapy last year which did not help. She likes to avoid medications. After discussing all options she elects for corticosteroid injection in the left knee. Risks and benefits discussed, post procedure precautions reviewed. Verbal consent obtained. After sterile prep of left knee, 5 cc of 0.75% Marcaine and 40 mg of Depo-Medrol were injected into left knee under sterile conditions. Tolerated procedure well. Short-term for routine long-term follow-up of right total knee, as needed for left knee. No follow-ups on file. Review Of Systems     Patient denies any recent fever, chills, nausea, vomiting, chest pain, or shortness of breath.     Allergies   Allergen Reactions    Bactrim [Sulfamethoprim Ds] Rash and Nausea and Vomiting    Other Medication Rash     SURGICAL GLUE    Oxycodone Nausea and Vomiting       Current Outpatient Medications   Medication Sig    dorzolamide HCl (DORZOLAMIDE OP) Apply 1 Drop to eye two (2) times a day. apixaban (Eliquis) 5 mg tablet Take 1 Tablet by mouth two (2) times a day. metoprolol succinate (TOPROL-XL) 50 mg XL tablet Take 1 Tablet by mouth daily. enalapril (VASOTEC) 10 mg tablet Take 1 Tablet by mouth daily. atorvastatin (LIPITOR) 40 mg tablet Take 1 Tablet by mouth nightly. meloxicam (MOBIC) 15 mg tablet TAKE 1 TABLET BY MOUTH EVERY DAY WITH FOOD    Breo Ellipta 200-25 mcg/dose inhaler     triamcinolone acetonide (KENALOG) 0.1 % topical cream APPLY TO AFFECTED AREA TWICE A DAY    dorzolamide-timoloL (COSOPT) 22.3-6.8 mg/mL ophthalmic solution     brimonidine (ALPHAGAN) 0.15 % ophthalmic solution     fluconazole (DIFLUCAN) 100 mg tablet Take 100 mg by mouth every seven (7) days. FDA advises cautious prescribing of oral fluconazole in pregnancy. montelukast (SINGULAIR) 10 mg tablet Take 10 mg by mouth daily. dapagliflozin (FARXIGA) 10 mg tab tablet Take  by mouth daily. insulin aspart protamine/insulin aspart (NOVOLOG MIX 70/30) 100 unit/mL (70-30) inpn by SubCUTAneous route three (3) times daily. PT TAKES ON SS, BUT SHE CANNOT TELL WHAT THE SCALE IS-STATES IT'S BASED ON WHAT SHE IS GOING TO EAT. pantoprazole (PROTONIX) 40 mg tablet Take 40 mg by mouth daily. olopatadine (PATANOL) 0.1 % ophthalmic solution Administer 2 Drops to both eyes two (2) times a day. cycloSPORINE (RESTASIS) 0.05 % dpet Administer 1 Drop to both eyes as needed. fluticasone (FLONASE) 50 mcg/actuation nasal spray 2 Sprays by Both Nostrils route two (2) times a day. aspirin delayed-release 81 mg tablet Take 1 Tablet by mouth daily. clopidogreL (Plavix) 75 mg tab Take 1 Tablet by mouth daily for 360 days. Indications: a sudden worsening of angina called acute coronary syndrome (Patient not taking: Reported on 12/9/2022)    prednisoln [id number]-lalxtea 1-0.5-0.075 % drop as directed. diclofenac (VOLTAREN) 1 % gel APPLY 4 GRAMS TP AFFECTED AREAS ON FEET 4 TIMES A DAY    nitroglycerin (NITROSTAT) 0.4 mg SL tablet DISSOLVE 1 TABLET UNDER THE TONGUE EVERY 5 MINUTES AS NEEDED FOR CHEST PAIN FOR UP TO 3 DOSES. clindamycin (CLINDAGEL) 1 % topical gel     amoxicillin (AMOXIL) 500 mg capsule     clotrimazole-betamethasone (LOTRISONE) topical cream     azelastine (ASTELIN) 137 mcg (0.1 %) nasal spray     acetaminophen (TYLENOL) 500 mg tablet Take 1 Tab by mouth every four (4) hours (while awake). (Patient not taking: Reported on 2022)    minocycline (MINOCIN, DYNACIN) 100 mg capsule Take  by mouth daily as needed. peg 400-propylene glycol 0.4-0.3 % drpg Apply  to eye two (2) times a day. No current facility-administered medications for this visit.        Past Medical History:   Diagnosis Date    Arthritis     Asthma     Atrial fibrillation ([facility] [address]     Diabetes ([facility] [address]     GERD (gastroesophageal reflux disease)     Glaucoma     High cholesterol     Hypertension     Long term current use of anticoagulant therapy     Nausea & vomiting     Psychiatric disorder     DEPRESSION        Past Surgical History:   Procedure Laterality Date    HX  SECTION  5439,8118    HX GYN  1986    left ovarian cyst    HX GYN  1987    cyst on tube    HX GYN  1988    cyst removed    HX HEART CATHETERIZATION  2000    HX HEENT  2001    GLAUCOMA SURGERY    HX HEENT  2007    TMJ    HX KNEE REPLACEMENT Right 2015    Revision RTK    HX ORTHOPAEDIC  2008    RIGHT ACL    HX ORTHOPAEDIC  2010    RIGHT TKR    HX ORTHOPAEDIC  2012    CARPAL TUNNEL    HX ORTHOPAEDIC  2013    CARPAL TUNNEL    HX ORTHOPAEDIC  2014    RIGHT TKR    NE BREAST SURGERY PROCEDURE UNLISTED  1993    KEERTHI TUMOR REMOVED-BENIGN       Family History   Problem Relation Age of Onset    Cancer Father         PROSTATE    Heart Attack Brother     Anesth Problems Neg Hx         Social History     Socioeconomic History    Marital status: LEGALLY      Spouse name: Not on file    Number of children: Not on file    Years of education: Not on file    Highest education level: Not on file   Occupational History    Not on file   Tobacco Use    Smoking status: Former     Packs/day: 0.25     Years: 10.00     Pack years: 2.50     Types: Cigarettes     Quit date: 12/15/2003     Years since quittin.9    Smokeless tobacco: Never   Substance and Sexual Activity    Alcohol use: No    Drug use: No    Sexual activity: Not on file   Other Topics Concern    Not on file   Social History Narrative    Not on file     Social Determinants of Health     Financial Resource Strain: Not on file   Food Insecurity: Not on file   Transportation Needs: Not on file   Physical Activity: Not on file   Stress: Not on file   Social Connections: Not on file   Intimate Partner Violence: Not on file   Housing Stability: Not on file       Orders Placed This Encounter    XR KNEES BI MIN 4 V     Standing Status:   Future     Number of Occurrences:   1     Standing Expiration Date:   12/10/2023    bupivacaine (PF) (MARCAINE) 0.75 % (7.5 mg/mL) injection 37.5 mg    methylPREDNISolone acetate (DEPO-MEDROL) 80 mg/mL injection 40 mg        An electronic signature was used to authenticate this note.   -- Samy Al MD

## 2022-12-13 ENCOUNTER — HOSPITAL ENCOUNTER (OUTPATIENT)
Dept: NUCLEAR MEDICINE | Age: 62
Discharge: HOME | End: 2022-12-13
Attending: INTERNAL MEDICINE
Payer: MEDICARE

## 2022-12-13 DIAGNOSIS — R10.13 EPIGASTRIC PAIN: ICD-10-CM

## 2022-12-13 DIAGNOSIS — R11.2 NAUSEA WITH VOMITING: ICD-10-CM

## 2022-12-13 PROCEDURE — 78226 HEPATOBILIARY SYSTEM IMAGING: CPT

## 2022-12-13 RX ORDER — KIT FOR THE PREPARATION OF TECHNETIUM TC 99M MEBROFENIN 45 MG/10ML
5.4 INJECTION, POWDER, LYOPHILIZED, FOR SOLUTION INTRAVENOUS
Status: COMPLETED | OUTPATIENT
Start: 2022-12-13 | End: 2022-12-13

## 2022-12-13 RX ADMIN — KIT FOR THE PREPARATION OF TECHNETIUM TC 99M MEBROFENIN 5.4 MILLICURIE: 45 INJECTION, POWDER, LYOPHILIZED, FOR SOLUTION INTRAVENOUS at 08:00

## 2023-02-24 ENCOUNTER — OFFICE VISIT (OUTPATIENT)
Dept: ORTHOPEDIC SURGERY | Age: 63
End: 2023-02-24
Payer: MEDICARE

## 2023-02-24 VITALS — HEIGHT: 63 IN | BODY MASS INDEX: 31.18 KG/M2 | WEIGHT: 176 LBS

## 2023-02-24 DIAGNOSIS — M17.12 PRIMARY OSTEOARTHRITIS OF LEFT KNEE: Primary | ICD-10-CM

## 2023-02-24 DIAGNOSIS — M51.16 LUMBAR DISC DISEASE WITH RADICULOPATHY: ICD-10-CM

## 2023-02-24 RX ORDER — METHYLPREDNISOLONE 4 MG/1
TABLET ORAL
Qty: 1 DOSE PACK | Refills: 0 | Status: SHIPPED | OUTPATIENT
Start: 2023-02-24

## 2023-02-24 RX ORDER — DICYCLOMINE HYDROCHLORIDE 20 MG/1
20 TABLET ORAL 3 TIMES DAILY
COMMUNITY
Start: 2023-02-11

## 2023-02-24 NOTE — LETTER
2/24/2023    Patient: Konstantin Marie   YOB: 1960   Date of Visit: 2/24/2023     Alana Herr MD  [address] [address]  Via Fax: [phone]    Dear Alana Herr MD,      Thank you for referring Ms. Konstantin Marie to [facility] for evaluation. My notes for this consultation are attached. If you have questions, please do not hesitate to call me. I look forward to following your patient along with you.       Sincerely,    Elmira Ferguson MD

## 2023-02-24 NOTE — PROGRESS NOTES
Nicole Wayne (: 1960) is a 58 y.o. female, patient, here for evaluation of the following chief complaint(s):  Knee Pain (Left )       SUBJECTIVE/OBJECTIVE:  Nicole Wayne presents today complaining of two problems. First problem is diffuse left knee pain with associated swelling. She has known osteoarthritis in the left knee. Cortisone injection less than 3 months ago provided significant relief. Knee pain returned about 2 weeks ago. No known injury or trauma. After last injection, states she had some issues with blood sugar and/or hypertension. In addition, she has lower back pain with radiation down her leg, affecting her foot and toes. Pain worse with increased activity. Remote history of lumbar spine MRI and treatment by Dr. Wanda Purdy with 2 epidural steroid injections. PHYSICAL EXAM:  Vitals: Ht 5' 3\" (1.6 m)   Wt 176 lb (79.8 kg)   BMI 31.18 kg/m²   Body mass index is 31.18 kg/m². 58y.o. year old F in no acute distress. Ambulates with a limp on the left, no assistive device, no Trendelenburg gait pattern. No nerve tension signs, negative straight leg raise. Negative Stinchfield on the left with preserved passive hip range of motion. No lateral trochanteric tenderness. Neutral alignment of the left knee. Tenderness to palpation along the medial joint line. She has mild swelling in the joint, and notes some \"swelling\" distal to the joint about her mid shin. Range of motion 0-115. No instability. Motor 5/5. No distal edema. IMAGING:  Radiographs: Prior knee x-rays reveal mild to moderate osteoarthritis in the left knee with moderate loss of medial compartment joint space and mild patellofemoral joint space loss. No recent spine imaging. Prior MRI reports reveal lumbar spine degenerative disc disease. ASSESSMENT/PLAN:  1.  Primary osteoarthritis of left knee  -     methylPREDNISolone (MEDROL DOSEPACK) 4 mg tablet; Per dose pack instructions, Normal, Disp-1 Dose Pack, R-0  2. Lumbar disc disease with radiculopathy    The xray and exam findings were discussed with the patient today. Multiple pain generators. Acute exacerbation of medial compartment right knee osteoarthritis. Questionable relief from prior cortisone injection with side effects to include elevated blood sugar/hypertension issues. It has not been long enough to consider repeat injection today. She is on Eliquis, cannot do anti-inflammatories. Discussed the risks/benefits of steroid taper. She will call her primary care physician first prior to considering this medication given her history of reaction from cortisone injection before. States she has tolerated oral steroid tapers well in the past.  For her back, I would recommend formal physical therapy. She will hold on this for now. If pain does not improve, consider therapy and referral to one of our spine partners as she has had spine evaluation in the past with MRI, and multiple epidural steroid injections. Follow-up as needed. Return if symptoms worsen or fail to improve. Review Of Systems  ROS    Positive for: Musculoskeletal  Last edited by Carolyn Shrestha on 2/24/2023 11:27 AM.         Patient denies any recent fever, chills, nausea, vomiting, chest pain, or shortness of breath. Allergies   Allergen Reactions    Bactrim [Sulfamethoprim Ds] Rash and Nausea and Vomiting    Other Medication Rash     SURGICAL GLUE    Oxycodone Nausea and Vomiting       Current Outpatient Medications   Medication Sig    dicyclomine (BENTYL) 20 mg tablet Take 20 mg by mouth three (3) times daily. methylPREDNISolone (MEDROL DOSEPACK) 4 mg tablet Per dose pack instructions    dorzolamide HCl (DORZOLAMIDE OP) Apply 1 Drop to eye two (2) times a day. aspirin delayed-release 81 mg tablet Take 1 Tablet by mouth daily. prednisoln bc-umfxuzls-bvuucgl 1-0.5-0.075 % drop as directed.     apixaban (Eliquis) 5 mg tablet Take 1 Tablet by mouth two (2) times a day.    metoprolol succinate (TOPROL-XL) 50 mg XL tablet Take 1 Tablet by mouth daily. enalapril (VASOTEC) 10 mg tablet Take 1 Tablet by mouth daily. atorvastatin (LIPITOR) 40 mg tablet Take 1 Tablet by mouth nightly. diclofenac (VOLTAREN) 1 % gel APPLY 4 GRAMS TP AFFECTED AREAS ON FEET 4 TIMES A DAY    meloxicam (MOBIC) 15 mg tablet TAKE 1 TABLET BY MOUTH EVERY DAY WITH FOOD    Breo Ellipta 200-25 mcg/dose inhaler     clindamycin (CLINDAGEL) 1 % topical gel     triamcinolone acetonide (KENALOG) 0.1 % topical cream APPLY TO AFFECTED AREA TWICE A DAY    dorzolamide-timoloL (COSOPT) 22.3-6.8 mg/mL ophthalmic solution     clotrimazole-betamethasone (LOTRISONE) topical cream     azelastine (ASTELIN) 137 mcg (0.1 %) nasal spray     brimonidine (ALPHAGAN) 0.15 % ophthalmic solution     fluconazole (DIFLUCAN) 100 mg tablet Take 100 mg by mouth every seven (7) days. FDA advises cautious prescribing of oral fluconazole in pregnancy. montelukast (SINGULAIR) 10 mg tablet Take 10 mg by mouth daily. dapagliflozin (FARXIGA) 10 mg tab tablet Take  by mouth daily. insulin aspart protamine/insulin aspart (NOVOLOG MIX 70/30) 100 unit/mL (70-30) inpn by SubCUTAneous route three (3) times daily. PT TAKES ON SS, BUT SHE CANNOT TELL WHAT THE SCALE IS-STATES IT'S BASED ON WHAT SHE IS GOING TO EAT. minocycline (MINOCIN, DYNACIN) 100 mg capsule Take  by mouth daily as needed. pantoprazole (PROTONIX) 40 mg tablet Take 40 mg by mouth daily. olopatadine (PATANOL) 0.1 % ophthalmic solution Administer 2 Drops to both eyes two (2) times a day. peg 400-propylene glycol 0.4-0.3 % drpg Apply  to eye two (2) times a day. cycloSPORINE (RESTASIS) 0.05 % dpet Administer 1 Drop to both eyes as needed. fluticasone (FLONASE) 50 mcg/actuation nasal spray 2 Sprays by Both Nostrils route two (2) times a day.     nitroglycerin (NITROSTAT) 0.4 mg SL tablet DISSOLVE 1 TABLET UNDER THE TONGUE EVERY 5 MINUTES AS NEEDED FOR CHEST PAIN FOR UP TO 3 DOSES.    amoxicillin (AMOXIL) 500 mg capsule      No current facility-administered medications for this visit.        Past Medical History:   Diagnosis Date    Arthritis     Asthma     Atrial fibrillation ([facility] [address]     Diabetes ([facility] [address]     GERD (gastroesophageal reflux disease)     Glaucoma     High cholesterol     Hypertension     Long term current use of anticoagulant therapy     Nausea & vomiting     Psychiatric disorder     DEPRESSION        Past Surgical History:   Procedure Laterality Date    HX  SECTION  4264,7763    HX GYN  1986    left ovarian cyst    HX GYN  1987    cyst on tube    HX GYN  1988    cyst removed    HX HEART CATHETERIZATION  2000    HX HEENT  2001    GLAUCOMA SURGERY    HX HEENT  2007    TMJ    HX KNEE REPLACEMENT Right 2015    Revision RTK    HX ORTHOPAEDIC  2008    RIGHT ACL    HX ORTHOPAEDIC  2010    RIGHT TKR    HX ORTHOPAEDIC  2012    CARPAL TUNNEL    HX ORTHOPAEDIC  2013    CARPAL TUNNEL    HX ORTHOPAEDIC  2014    RIGHT TKR    PA UNLISTED PROCEDURE BREAST  1993    KEERTHI TUMOR REMOVED-BENIGN       Family History   Problem Relation Age of Onset    Cancer Father         PROSTATE    Heart Attack Brother     Anesth Problems Neg Hx         Social History     Socioeconomic History    Marital status: LEGALLY      Spouse name: Not on file    Number of children: Not on file    Years of education: Not on file    Highest education level: Not on file   Occupational History    Not on file   Tobacco Use    Smoking status: Former     Packs/day: 0.25     Years: 10.00     Pack years: 2.50     Types: Cigarettes     Quit date: 12/15/2003     Years since quittin.2    Smokeless tobacco: Never   Substance and Sexual Activity    Alcohol use: No    Drug use: No    Sexual activity: Not on file   Other Topics Concern    Not on file   Social History Narrative    Not on file     Social Determinants of Health     Financial Resource Strain: Not on file   Food Insecurity: Not on file   Transportation Needs: Not on file   Physical Activity: Not on file   Stress: Not on file   Social Connections: Not on file   Intimate Partner Violence: Not on file   Housing Stability: Not on file       Orders Placed This Encounter    methylPREDNISolone (MEDROL DOSEPACK) 4 mg tablet     Sig: Per dose pack instructions     Dispense:  1 Dose Pack     Refill:  0      Gary العراقي M.D. was available for immediate consultation as the supervising physician. An electronic signature was used to authenticate this note.   -- Laurent Church PA-C

## 2023-02-27 ENCOUNTER — OFFICE VISIT (OUTPATIENT)
Dept: CARDIOLOGY CLINIC | Age: 63
End: 2023-02-27
Payer: MEDICARE

## 2023-02-27 VITALS
BODY MASS INDEX: 31.93 KG/M2 | OXYGEN SATURATION: 98 % | WEIGHT: 180.2 LBS | RESPIRATION RATE: 18 BRPM | HEART RATE: 60 BPM | HEIGHT: 63 IN | SYSTOLIC BLOOD PRESSURE: 100 MMHG | DIASTOLIC BLOOD PRESSURE: 68 MMHG

## 2023-02-27 DIAGNOSIS — I10 BENIGN ESSENTIAL HTN: ICD-10-CM

## 2023-02-27 DIAGNOSIS — I48.0 PAROXYSMAL ATRIAL FIBRILLATION (HCC): Primary | ICD-10-CM

## 2023-02-27 DIAGNOSIS — Z01.810 PREOP CARDIOVASCULAR EXAM: ICD-10-CM

## 2023-02-27 DIAGNOSIS — E11.8 DM TYPE 2, CONTROLLED, WITH COMPLICATION (HCC): ICD-10-CM

## 2023-02-27 PROCEDURE — 3078F DIAST BP <80 MM HG: CPT | Performed by: INTERNAL MEDICINE

## 2023-02-27 PROCEDURE — 93005 ELECTROCARDIOGRAM TRACING: CPT | Performed by: INTERNAL MEDICINE

## 2023-02-27 PROCEDURE — 93010 ELECTROCARDIOGRAM REPORT: CPT | Performed by: INTERNAL MEDICINE

## 2023-02-27 PROCEDURE — 3074F SYST BP LT 130 MM HG: CPT | Performed by: INTERNAL MEDICINE

## 2023-02-27 PROCEDURE — 99204 OFFICE O/P NEW MOD 45 MIN: CPT | Performed by: INTERNAL MEDICINE

## 2023-02-27 PROCEDURE — G0463 HOSPITAL OUTPT CLINIC VISIT: HCPCS | Performed by: INTERNAL MEDICINE

## 2023-02-27 NOTE — LETTER
Patient:  Conchis Richardson   YOB: 1960  Date of Visit: 2/27/2023      Dear MD Pablito Gamboa  [facility] [address] [facility] [address]  V[address] In [address] Qing Tsyon MD  [address] [place] [id number]  Via Fax: [phone]:     Ms. Gabi Uribe is a 57 yo F seen in the past by Dr. Braydon Bernard and Radha Stewart, coronary artery disease with recent cardiac catheterization with Dr. Kt Suero on 11/19/2021 demonstrating ostial/proximal LAD, mid LAD and significant mid circumflex lesions treated with two drug eluting stents to the LAD and one drug eluting stent to the circumflex. 3/2022 stress test was normal.    Since her last visit, she has mostly been having issues with her left knee. She did have injection and is now on steroids, but they are telling her she might need surgery. She also had an EGD last fall that noted a Schatzki's ring and this was dilated with GI, Dr. Bridger Slade. She denies any exertional chest pain. Her breathing has been stable. No significant palpitations. She has been compliant with her medications. She also has some issues with glaucoma in her right eye. She is followed closely by orthopedics. She is compensated on exam  Assessment and Plan:   1. Atrial fibrillation. She is stable in sinus rhythm. 2. Coronary artery disease, status post LAD stent in 2021. She is stable and compensated. Stress test last year was normal.  No additional cardiac evaluation or adjustments at this time. 3. Preop cardiac evaluation. She is stable cardiac wise and low to moderate risk for cardiac complications. Aspirin can be held as needed prior to her surgery and Eliquis can be held 48 hours prior. Will send a clearance note to her primary care physician and her orthopedist, Dr. Troy Torres. 4. Status post knee surgery in 2015.   5. Essential hypertension. Blood pressure is controlled. 6. Type 2 diabetes. 7. Dyslipidemia.     8. Gastroesophageal reflux disease, Chidi's ring. EGD in 2022 she had dilation of her ring with Dr. Lucia Cockayne. If you have questions, please do not hesitate to call me.     Sincerely,      Yaya Marinelli MD

## 2023-02-27 NOTE — PROGRESS NOTES
[place] [place]: [place]  [phone]    History of Present Illness:  Ms. Gal Telles is a 57 yo F seen in the past by Dr. Morris Headley and Deonte Mcleod, coronary artery disease with recent cardiac catheterization with Dr. Que Dennison on 11/19/2021 demonstrating ostial/proximal LAD, mid LAD and significant mid circumflex lesions treated with two drug eluting stents to the LAD and one drug eluting stent to the circumflex. 3/2022 stress test was normal.    Since her last visit, she has mostly been having issues with her left knee. She did have injection and is now on steroids, but they are telling her she might need surgery. She also had an EGD last fall that noted a Schatzki's ring and this was dilated with GI, Dr. Mari Munoz. She denies any exertional chest pain. Her breathing has been stable. No significant palpitations. She has been compliant with her medications. She also has some issues with glaucoma in her right eye. She is followed closely by orthopedics. She is compensated on exam  Assessment and Plan:   1. Atrial fibrillation. She is stable in sinus rhythm. 2. Coronary artery disease, status post LAD stent in 2021. She is stable and compensated. Stress test last year was normal.  No additional cardiac evaluation or adjustments at this time. 3. Preop cardiac evaluation. She is stable cardiac wise and low to moderate risk for cardiac complications. Aspirin can be held as needed prior to her surgery and Eliquis can be held 48 hours prior. Will send a clearance note to her primary care physician and her orthopedist, Dr. Dustin Bunch. 4. Status post knee surgery in 2015.   5. Essential hypertension. Blood pressure is controlled. 6. Type 2 diabetes. 7. Dyslipidemia. 8. Gastroesophageal reflux disease, Schatzki's ring. EGD in 2022 she had dilation of her ring with Dr. Mari Munoz.             Echo 10/20 Ef 60% - mild MR  Nuc Stress test 6/18 - no ischemia Ef 69%  Echo 3/17 - LVEF 55-60%, no WMA, trivial MR  Loop Monitor 5/16 - no arrhythmias  Brennon Nuc Stress 1/16 - no ischemia, LVEF 60%  Echo 12/15 - LVEF 55-60%, no WMA  Cardiac Cath 11/2000 - no CAD, no plaque, apical HK, LVEF 67%    Fam Hx: brother with MI at age 37, mother had CABG (patient of Dr. Alka Wheat)  Soc Hx: no tobacco use      She  has a past medical history of Arthritis, Asthma, Atrial fibrillation ([facility] [address] Diabetes ([facility] [address] GERD (gastroesophageal reflux disease), Glaucoma, High cholesterol, Hypertension, Long term current use of anticoagulant therapy, Nausea & vomiting, and Psychiatric disorder. All other systems negative except as above. PE  Vitals:    02/27/23 1317   BP: 100/68   Pulse: 60   Resp: 18   SpO2: 98%   Weight: 180 lb 3.2 oz (81.7 kg)   Height: 5' 3\" (1.6 m)    Body mass index is 31.92 kg/m².    General appearance - alert, well appearing, and in no distress  Mental status - affect appropriate to mood  Eyes - sclera anicteric, moist mucous membranes  Neck - supple, no JVD  Chest - clear to auscultation, no wheezes, rales or rhonchi  Heart - normal rate, regular rhythm, normal S1, S2, no murmurs, rubs, clicks or gallops  Abdomen - soft, nontender, nondistended, no masses or organomegaly  Neurological - no focal deficit  Extremities - peripheral pulses normal, no pedal edema      Recent Labs:  No results found for: CHOL, CHOLX, CHLST, CHOLV, 285496, HDL, HDLP, LDL, LDLC, DLDLP, TGLX, TRIGL, TRIGP, CHHD, CHHDX  Lab Results   Component Value Date/Time    Creatinine (POC) 0.50 (L) 07/13/2021 03:02 PM    Creatinine 0.64 11/11/2021 11:28 AM     Lab Results   Component Value Date/Time    BUN 15 11/11/2021 11:28 AM     Lab Results   Component Value Date/Time    Potassium 4.2 11/11/2021 11:28 AM     Lab Results   Component Value Date/Time    Hemoglobin A1c 8.1 (H) 12/15/2015 03:36 PM     Lab Results   Component Value Date/Time    HGB 14.2 11/11/2021 11:28 AM     Lab Results   Component Value Date/Time    PLATELET 985 87/23/7278 11:28 AM Reviewed:  Past Medical History:   Diagnosis Date    Arthritis     Asthma     Atrial fibrillation (HCC)     Diabetes ([facility] [address]     GERD (gastroesophageal reflux disease)     Glaucoma     High cholesterol     Hypertension     Long term current use of anticoagulant therapy     Nausea & vomiting     Psychiatric disorder     DEPRESSION     Social History     Tobacco Use   Smoking Status Former    Packs/day: 0.25    Years: 10.00    Pack years: 2.50    Types: Cigarettes    Quit date: 12/15/2003    Years since quittin.2   Smokeless Tobacco Never     Social History     Substance and Sexual Activity   Alcohol Use No     Allergies   Allergen Reactions    2-Octyl Cyanoacrylate Rash    Adhesive Unknown (comments)    Bactrim [Sulfamethoprim Ds] Rash and Nausea and Vomiting    Other Medication Rash     SURGICAL GLUE    Oxycodone Nausea and Vomiting       Current Outpatient Medications   Medication Sig    dicyclomine (BENTYL) 20 mg tablet Take 20 mg by mouth three (3) times daily. methylPREDNISolone (MEDROL DOSEPACK) 4 mg tablet Per dose pack instructions    dorzolamide HCl (DORZOLAMIDE OP) Apply 1 Drop to eye two (2) times a day. aspirin delayed-release 81 mg tablet Take 1 Tablet by mouth daily. prednisoln [id number]-oksdeox 1-0.5-0.075 % drop as directed. apixaban (Eliquis) 5 mg tablet Take 1 Tablet by mouth two (2) times a day. metoprolol succinate (TOPROL-XL) 50 mg XL tablet Take 1 Tablet by mouth daily. enalapril (VASOTEC) 10 mg tablet Take 1 Tablet by mouth daily. atorvastatin (LIPITOR) 40 mg tablet Take 1 Tablet by mouth nightly. diclofenac (VOLTAREN) 1 % gel APPLY 4 GRAMS TP AFFECTED AREAS ON FEET 4 TIMES A DAY    meloxicam (MOBIC) 15 mg tablet TAKE 1 TABLET BY MOUTH EVERY DAY WITH FOOD    nitroglycerin (NITROSTAT) 0.4 mg SL tablet DISSOLVE 1 TABLET UNDER THE TONGUE EVERY 5 MINUTES AS NEEDED FOR CHEST PAIN FOR UP TO 3 DOSES.     Breo Ellipta 200-25 mcg/dose inhaler     clindamycin (CLINDAGEL) 1 % topical gel     amoxicillin (AMOXIL) 500 mg capsule     triamcinolone acetonide (KENALOG) 0.1 % topical cream APPLY TO AFFECTED AREA TWICE A DAY    dorzolamide-timoloL (COSOPT) 22.3-6.8 mg/mL ophthalmic solution     clotrimazole-betamethasone (LOTRISONE) topical cream     azelastine (ASTELIN) 137 mcg (0.1 %) nasal spray     brimonidine (ALPHAGAN) 0.15 % ophthalmic solution     fluconazole (DIFLUCAN) 100 mg tablet Take 100 mg by mouth every seven (7) days. FDA advises cautious prescribing of oral fluconazole in pregnancy. montelukast (SINGULAIR) 10 mg tablet Take 10 mg by mouth daily. dapagliflozin (FARXIGA) 10 mg tab tablet Take  by mouth daily. insulin aspart protamine/insulin aspart (NOVOLOG MIX 70/30) 100 unit/mL (70-30) inpn by SubCUTAneous route three (3) times daily. PT TAKES ON SS, BUT SHE CANNOT TELL WHAT THE SCALE IS-STATES IT'S BASED ON WHAT SHE IS GOING TO EAT. minocycline (MINOCIN, DYNACIN) 100 mg capsule Take  by mouth daily as needed. pantoprazole (PROTONIX) 40 mg tablet Take 40 mg by mouth daily. olopatadine (PATANOL) 0.1 % ophthalmic solution Administer 2 Drops to both eyes two (2) times a day. peg 400-propylene glycol 0.4-0.3 % drpg Apply  to eye two (2) times a day. cycloSPORINE (RESTASIS) 0.05 % dpet Administer 1 Drop to both eyes as needed. fluticasone (FLONASE) 50 mcg/actuation nasal spray 2 Sprays by Both Nostrils route two (2) times a day. No current facility-administered medications for this visit.        Nydia Qiu MD  [facility] [place]  [address] [facility] [address]  [address]

## 2023-03-21 DIAGNOSIS — E78.5 DYSLIPIDEMIA: ICD-10-CM

## 2023-03-23 RX ORDER — ATORVASTATIN CALCIUM 40 MG/1
40 TABLET, FILM COATED ORAL
Qty: 90 TABLET | Refills: 0 | Status: SHIPPED | OUTPATIENT
Start: 2023-03-23

## 2023-03-23 NOTE — TELEPHONE ENCOUNTER
Requested Prescriptions     Signed Prescriptions Disp Refills    atorvastatin (LIPITOR) 40 mg tablet 90 Tablet 0     Sig: Take 1 Tablet by mouth nightly. PCP follows cholesterol labs. Please send future refill request to PCP.      Authorizing Provider: Jose Luis Duong     Ordering User: Tye Bourgeois     Verbal order per Dr. Gilford Lites.     Future Appointments   Date Time Provider Pablito Shah   3/31/2023  9:45 AM JAIMIE Cannon AMB   9/5/2023  1:20 PM MD ORI Warren Caro AMB

## 2023-03-31 ENCOUNTER — OFFICE VISIT (OUTPATIENT)
Dept: ORTHOPEDIC SURGERY | Age: 63
End: 2023-03-31

## 2023-03-31 VITALS — HEIGHT: 63 IN | BODY MASS INDEX: 31.89 KG/M2 | WEIGHT: 180 LBS

## 2023-03-31 DIAGNOSIS — M17.12 PRIMARY OSTEOARTHRITIS OF LEFT KNEE: Primary | ICD-10-CM

## 2023-03-31 DIAGNOSIS — M51.16 LUMBAR DISC DISEASE WITH RADICULOPATHY: ICD-10-CM

## 2023-03-31 RX ORDER — BUPIVACAINE HYDROCHLORIDE 2.5 MG/ML
5 INJECTION, SOLUTION INFILTRATION; PERINEURAL ONCE
Status: COMPLETED | OUTPATIENT
Start: 2023-03-31 | End: 2023-03-31

## 2023-03-31 RX ORDER — METHYLPREDNISOLONE ACETATE 40 MG/ML
40 INJECTION, SUSPENSION INTRA-ARTICULAR; INTRALESIONAL; INTRAMUSCULAR; SOFT TISSUE ONCE
Status: COMPLETED | OUTPATIENT
Start: 2023-03-31 | End: 2023-03-31

## 2023-03-31 RX ADMIN — METHYLPREDNISOLONE ACETATE 40 MG: 40 INJECTION, SUSPENSION INTRA-ARTICULAR; INTRALESIONAL; INTRAMUSCULAR; SOFT TISSUE at 10:07

## 2023-03-31 RX ADMIN — BUPIVACAINE HYDROCHLORIDE 12.5 MG: 2.5 INJECTION, SOLUTION INFILTRATION; PERINEURAL at 10:07

## 2023-03-31 NOTE — LETTER
3/31/2023    Patient: Shamir Briones   YOB: 1960   Date of Visit: 3/31/2023     Senait Moyer MD  [address] [address]  Via Fax: [phone]    Dear Senait Moyer MD,      Thank you for referring Ms. Shamir Briones to [facility] for evaluation. My notes for this consultation are attached. If you have questions, please do not hesitate to call me. I look forward to following your patient along with you.       Sincerely,    Elizabeth Varela PA-C

## 2023-03-31 NOTE — PROGRESS NOTES
Perri Cole (: 1960) is a 58 y.o. female, patient, here for evaluation of the following chief complaint(s):  Knee Pain (Left /)       SUBJECTIVE/OBJECTIVE:  Perri Cole presents today complaining of continued left knee pain. She was seen a month ago for multiple pain generators. She had acute exacerbation of her medial compartment right knee osteoarthritis. It was too soon to consider cortisone injection at that time. She was also having overlapping symptoms from her lumbar spine with radicular pain down her left leg. I prescribed a steroid taper. She is on Eliquis, cannot do anti-inflammatories. Feels like the steroid taper helped for short time while she was on it. Pain is now returned. PHYSICAL EXAM:  Vitals: Ht 5' 3\" (1.6 m)   Wt 180 lb (81.6 kg)   BMI 31.89 kg/m²   Body mass index is 31.89 kg/m². 58y.o. year old F in no acute distress. Ambulates with a limp on the left, no assistive device. No nerve tension signs on the left. Negative Stinchfield with preserved passive hip range of motion. Left knee neutral alignment. Medial joint line tenderness to palpation. Trace effusion. Range of motion 0-1 10/115. Motor 5/5. No distal edema. IMAGING:  Radiographs: Recent knee x-rays reviewed, revealing mild to moderate osteoarthritis in the left knee, moderate loss of medial compartment joint space and mild patellofemoral joint space loss. Remote history of MRI report revealing lumbar spine degenerative disc disease. ASSESSMENT/PLAN:  1. Primary osteoarthritis of left knee  -     BUPivacaine HCl (MARCAINE) 0.25 % (2.5 mg/mL) injection 12.5 mg; 12.5 mg (5 mL), Other, ONCE, 1 dose, On Fri 3/31/23 at 1100  -     methylPREDNISolone acetate (DEPO-MEDROL) 40 mg/mL injection 40 mg; 40 mg, Intra artICUlar, ONCE, 1 dose, On Fri 3/31/23 at 1100  2. Lumbar disc disease with radiculopathy  The xray and exam findings were discussed with the patient today.   Acute exacerbation of left knee osteoarthritis. It has now been long enough to consider repeat cortisone injection which she wishes to do today. Last injection only lasted a month. If this injection provides only short-term relief, patient would like to consider MRI. She will call to have this ordered if pain quickly returns. For her back, I have recommended formal physical therapy. States she has \"taken a lot of therapy already\". Recommend continued physician directed home exercises. Recommend formal evaluation with one of our spine partners. She has had spine evaluation in the past, multiple steroid injections. Follow-up as needed. Discussed risks/benefits of cortisone injection and patient gave verbal consent. Under sterile conditions, the left knee was injected with 5cc 0.25% Bupivacaine and 1cc 40mg Depo Medrol intra-articularly, tolerated the procedure well. Return if symptoms worsen or fail to improve. Review Of Systems  ROS    Positive for: Musculoskeletal  Last edited by Melida Helton on 3/31/2023  9:38 AM.         Patient denies any recent fever, chills, nausea, vomiting, chest pain, or shortness of breath. Allergies   Allergen Reactions    2-Octyl Cyanoacrylate Rash    Adhesive Unknown (comments)    Bactrim [Sulfamethoprim Ds] Rash and Nausea and Vomiting    Other Medication Rash     SURGICAL GLUE    Oxycodone Nausea and Vomiting       Current Outpatient Medications   Medication Sig    atorvastatin (LIPITOR) 40 mg tablet Take 1 Tablet by mouth nightly. PCP follows cholesterol labs. Please send future refill request to PCP. dicyclomine (BENTYL) 20 mg tablet Take 20 mg by mouth three (3) times daily. methylPREDNISolone (MEDROL DOSEPACK) 4 mg tablet Per dose pack instructions    dorzolamide HCl (DORZOLAMIDE OP) Apply 1 Drop to eye two (2) times a day. aspirin delayed-release 81 mg tablet Take 1 Tablet by mouth daily. prednisoln pr-rhodptra-vxmazvi 1-0.5-0.075 % drop as directed. apixaban (Eliquis) 5 mg tablet Take 1 Tablet by mouth two (2) times a day. metoprolol succinate (TOPROL-XL) 50 mg XL tablet Take 1 Tablet by mouth daily. enalapril (VASOTEC) 10 mg tablet Take 1 Tablet by mouth daily. diclofenac (VOLTAREN) 1 % gel APPLY 4 GRAMS TP AFFECTED AREAS ON FEET 4 TIMES A DAY    meloxicam (MOBIC) 15 mg tablet TAKE 1 TABLET BY MOUTH EVERY DAY WITH FOOD    nitroglycerin (NITROSTAT) 0.4 mg SL tablet DISSOLVE 1 TABLET UNDER THE TONGUE EVERY 5 MINUTES AS NEEDED FOR CHEST PAIN FOR UP TO 3 DOSES. Breo Ellipta 200-25 mcg/dose inhaler     clindamycin (CLINDAGEL) 1 % topical gel     amoxicillin (AMOXIL) 500 mg capsule     triamcinolone acetonide (KENALOG) 0.1 % topical cream APPLY TO AFFECTED AREA TWICE A DAY    dorzolamide-timoloL (COSOPT) 22.3-6.8 mg/mL ophthalmic solution     clotrimazole-betamethasone (LOTRISONE) topical cream     azelastine (ASTELIN) 137 mcg (0.1 %) nasal spray     brimonidine (ALPHAGAN) 0.15 % ophthalmic solution     fluconazole (DIFLUCAN) 100 mg tablet Take 100 mg by mouth every seven (7) days. FDA advises cautious prescribing of oral fluconazole in pregnancy. montelukast (SINGULAIR) 10 mg tablet Take 10 mg by mouth daily. dapagliflozin (FARXIGA) 10 mg tab tablet Take  by mouth daily. insulin aspart protamine/insulin aspart (NOVOLOG MIX 70/30) 100 unit/mL (70-30) inpn by SubCUTAneous route three (3) times daily. PT TAKES ON SS, BUT SHE CANNOT TELL WHAT THE SCALE IS-STATES IT'S BASED ON WHAT SHE IS GOING TO EAT. minocycline (MINOCIN, DYNACIN) 100 mg capsule Take  by mouth daily as needed. pantoprazole (PROTONIX) 40 mg tablet Take 40 mg by mouth daily. olopatadine (PATANOL) 0.1 % ophthalmic solution Administer 2 Drops to both eyes two (2) times a day. peg 400-propylene glycol 0.4-0.3 % drpg Apply  to eye two (2) times a day. cycloSPORINE (RESTASIS) 0.05 % dpet Administer 1 Drop to both eyes as needed.     fluticasone (FLONASE) 50 mcg/actuation nasal spray 2 Sprays by Both Nostrils route two (2) times a day. No current facility-administered medications for this visit.        Past Medical History:   Diagnosis Date    Arthritis     Asthma     Atrial fibrillation ([facility] [address]     Diabetes ([facility] [address]     GERD (gastroesophageal reflux disease)     Glaucoma     High cholesterol     Hypertension     Long term current use of anticoagulant therapy     Nausea & vomiting     Psychiatric disorder     DEPRESSION        Past Surgical History:   Procedure Laterality Date    HX  SECTION  3997,3407    HX GYN  1986    left ovarian cyst    HX GYN  1987    cyst on tube    HX GYN  1988    cyst removed    HX HEART CATHETERIZATION  2000    HX HEENT  2001    GLAUCOMA SURGERY    HX HEENT  2007    TMJ    HX KNEE REPLACEMENT Right 2015    Revision RTK    HX ORTHOPAEDIC  2008    RIGHT ACL    HX ORTHOPAEDIC  2010    RIGHT TKR    HX ORTHOPAEDIC  2012    CARPAL TUNNEL    HX ORTHOPAEDIC  2013    CARPAL TUNNEL    HX ORTHOPAEDIC  2014    RIGHT TKR    IR CHOLECYSTOSTOMY PERCUTANEOUS  2023    MA UNLISTED PROCEDURE BREAST  1993    KEERTHI TUMOR REMOVED-BENIGN       Family History   Problem Relation Age of Onset    Cancer Father         PROSTATE    Heart Attack Brother     Anesth Problems Neg Hx         Social History     Socioeconomic History    Marital status: LEGALLY      Spouse name: Not on file    Number of children: Not on file    Years of education: Not on file    Highest education level: Not on file   Occupational History    Not on file   Tobacco Use    Smoking status: Former     Packs/day: 0.25     Years: 10.00     Pack years: 2.50     Types: Cigarettes     Quit date: 12/15/2003     Years since quittin.3    Smokeless tobacco: Never   Substance and Sexual Activity    Alcohol use: No    Drug use: No    Sexual activity: Not on file   Other Topics Concern    Not on file   Social History Narrative Not on file     Social Determinants of Health     Financial Resource Strain: Not on file   Food Insecurity: Not on file   Transportation Needs: Not on file   Physical Activity: Not on file   Stress: Not on file   Social Connections: Not on file   Intimate Partner Violence: Not on file   Housing Stability: Not on file       Orders Placed This Encounter    BUPivacaine HCl (MARCAINE) 0.25 % (2.5 mg/mL) injection 12.5 mg    methylPREDNISolone acetate (DEPO-MEDROL) 40 mg/mL injection 40 mg      Gary Archer M.D. was available for immediate consultation as the supervising physician. An electronic signature was used to authenticate this note.   -- Jennifer Beauchamp PA-C

## 2023-04-19 ENCOUNTER — OFFICE VISIT (OUTPATIENT)
Dept: ORTHOPEDIC SURGERY | Age: 63
End: 2023-04-19

## 2023-04-19 VITALS — HEIGHT: 63 IN | BODY MASS INDEX: 31.01 KG/M2 | WEIGHT: 175 LBS

## 2023-04-19 DIAGNOSIS — M17.12 PRIMARY OSTEOARTHRITIS OF LEFT KNEE: Primary | ICD-10-CM

## 2023-04-19 DIAGNOSIS — M51.16 LUMBAR DISC DISEASE WITH RADICULOPATHY: ICD-10-CM

## 2023-04-19 RX ORDER — PREDNISONE 5 MG/1
TABLET ORAL
Qty: 48 TABLET | Refills: 0 | Status: SHIPPED | OUTPATIENT
Start: 2023-04-19

## 2023-04-19 NOTE — PROGRESS NOTES
Analy Mcdermott (: 1960) is a 58 y.o. female, patient, here for evaluation of the following chief complaint(s):  Knee Pain (Right, MRI: 23)       SUBJECTIVE/OBJECTIVE:  Analy Mcdermott presents today for follow-up of left knee pain. Reddy Sheridanr saw her not too long ago. She underwent left knee injection at that visit which did not help. She continues to have medial left knee pain. She also has confounding symptoms from the lumbar spine, with significant radicular component lower extremity. Relates constant aching knee pain, wakening night pain. Some of this pain is from her lumbar spine but certainly she is having some local knee pain. Mechanical symptoms. Cannot take oral anti-inflammatories due to ongoing anticoagulation. Oral steroid taper did not help. Physical therapy did not help. PHYSICAL EXAM:  Vitals: Ht 5' 3\" (1.6 m)   Wt 175 lb (79.4 kg)   BMI 31.00 kg/m²   Body mass index is 31 kg/m². 58y.o. year old F, no distress. Antalgic gait on the left. No pain with motion of lumbar spine. Positive straight leg raise test on the left. Pain-free motion left hip. Negative Stinchfield. Left knee is in neutral alignment. Mild to moderate effusion but no warmth. Tender medial joint line. Full active knee extension with flexion to approximately 115 degrees. Patella tracks centrally. No instability. Symmetrical palpable distal pulses. No gross motor or sensory deficits in lower extremities. No distal edema. IMAGING:  Radiographs: Recent x-rays of left knee demonstrate moderate osteoarthritis with moderate loss of medial and patellofemoral compartment joint spaces.     MRI Results (most recent):  Results from [place] encounter on 23    MRI KNEE LT WO CONT    Narrative  EXAM: MRI KNEE LT WO CONT    INDICATION: Pain    COMPARISON: Radiographs 2022    TECHNIQUE: Axial T2 fat-saturated; coronal T1 and proton density fat-saturated;  and sagittal T2 fat-saturated, proton density fat-saturated, and gradient echo  MRI of the left knee . CONTRAST: None. FINDINGS: Bone marrow: Subchondral reactive bony signal demonstrated within the  medial patellar facet and the posterior weightbearing medial femoral condyle. Subcentimeter tibial cyst subjacent to posterior root attachment of medial  meniscus with mild adjacent edema. No acute fracture, dislocation, or marrow  replacing process. Joint fluid: Moderate knee effusion. No Baker's cyst.    Collateral ligaments and posterior, lateral corner: Intact. Medial meniscus: Peripheral subluxation of the body measuring up to 4 mm. Attenuated size in the posterior horn, without discrete tear demonstrated. Lateral meniscus: Intact. ACL and PCL: Intact. Tendons: Intact. Muscles: Within normal limits. Patellofemoral alignment: No patellar subluxation/tilt. Trochlear groove is not  hypoplastic. TT-TG distance: Normal.    Articular cartilage: Diffuse intermediate grade chondral derangement within the  medial femoral condyle with high-grade derangement shown the posterior  weightbearing portion of the condyle. Intermediate to high-grade chondral  derangement at the central weightbearing medial tibial plateau. Intermediate to  high-grade chondral derangement in majority of medial patellar facet and median  ridge. Intermediate grade derangement shown inferiorly in the medial trochlear  facet. Soft tissue mass: None. Impression  1. Extensive intermediate grade chondral derangement in medial compartment with  high-grade foci in posterior weightbearing portion of medial femoral condyle. 2. Intermediate and high-grade chondral derangement in maturity of medial  patellar facet and median ridge with intermediate grade derangement inferiorly  in the medial trochlear facet. 3. Peripheral subluxation of the body of the medial meniscus measuring up to 4  mm with attenuated size of the posterior horn: No discrete tear.   4. Moderate knee effusion. ASSESSMENT/PLAN:  1. Primary osteoarthritis of left knee    Moderate radiographic osteoarthritis of the left knee. She has no significant symptoms. Overlapping with lumbar spine symptoms. Discussed continued conservative treatment measures. She understands knee replacement is not an option until she has severe radiographic disease. She is undergone right primary total knee replacement many years ago, followed by revision surgery. She has not done well with the right knee after any of her previous procedures. She has basically exhausted all conservative treatment measures. It is too soon for another corticosteroid injection. She declines another course of physical therapy. Cannot take anti-inflammatories. To temporize her symptoms I recommended knee aspiration followed by another course of oral steroids. She is in agreement. Risks and benefits discussed, as well as post procedure precautions. After sterile prep of left lateral knee, skin was anesthetized with 2 cc of 0.5% Marcaine. 18-gauge needle was utilized to aspirate left knee joint under sterile conditions. Approximate 20 cc of normal appearing joint fluid was obtained. She will complete another oral steroid taper. Return as needed       No follow-ups on file. Review Of Systems  ROS    Positive for: Musculoskeletal  Last edited by Latanya Palafox on 4/19/2023 11:20 AM.         Patient denies any recent fever, chills, nausea, vomiting, chest pain, or shortness of breath. Allergies   Allergen Reactions    2-Octyl Cyanoacrylate Rash    Adhesive Unknown (comments)    Bactrim [Sulfamethoprim Ds] Rash and Nausea and Vomiting    Other Medication Rash     SURGICAL GLUE    Oxycodone Nausea and Vomiting       Current Outpatient Medications   Medication Sig    atorvastatin (LIPITOR) 40 mg tablet Take 1 Tablet by mouth nightly. PCP follows cholesterol labs.   Please send future refill request to PCP. dicyclomine (BENTYL) 20 mg tablet Take 1 Tablet by mouth three (3) times daily. methylPREDNISolone (MEDROL DOSEPACK) 4 mg tablet Per dose pack instructions    dorzolamide HCl (DORZOLAMIDE OP) Apply 1 Drop to eye two (2) times a day. aspirin delayed-release 81 mg tablet Take 1 Tablet by mouth daily. prednisoln je-cvfzwwto-ihcofop 1-0.5-0.075 % drop as directed. apixaban (Eliquis) 5 mg tablet Take 1 Tablet by mouth two (2) times a day. metoprolol succinate (TOPROL-XL) 50 mg XL tablet Take 1 Tablet by mouth daily. enalapril (VASOTEC) 10 mg tablet Take 1 Tablet by mouth daily. diclofenac (VOLTAREN) 1 % gel APPLY 4 GRAMS TP AFFECTED AREAS ON FEET 4 TIMES A DAY    meloxicam (MOBIC) 15 mg tablet TAKE 1 TABLET BY MOUTH EVERY DAY WITH FOOD    nitroglycerin (NITROSTAT) 0.4 mg SL tablet DISSOLVE 1 TABLET UNDER THE TONGUE EVERY 5 MINUTES AS NEEDED FOR CHEST PAIN FOR UP TO 3 DOSES. Breo Ellipta 200-25 mcg/dose inhaler     clindamycin (CLINDAGEL) 1 % topical gel     amoxicillin (AMOXIL) 500 mg capsule     triamcinolone acetonide (KENALOG) 0.1 % topical cream APPLY TO AFFECTED AREA TWICE A DAY    dorzolamide-timoloL (COSOPT) 22.3-6.8 mg/mL ophthalmic solution     clotrimazole-betamethasone (LOTRISONE) topical cream     azelastine (ASTELIN) 137 mcg (0.1 %) nasal spray     brimonidine (ALPHAGAN) 0.15 % ophthalmic solution     fluconazole (DIFLUCAN) 100 mg tablet Take 1 Tablet by mouth every seven (7) days. FDA advises cautious prescribing of oral fluconazole in pregnancy. montelukast (SINGULAIR) 10 mg tablet Take 1 Tablet by mouth daily. dapagliflozin (FARXIGA) 10 mg tab tablet Take  by mouth daily. insulin aspart protamine/insulin aspart (NOVOLOG MIX 70/30) 100 unit/mL (70-30) inpn by SubCUTAneous route three (3) times daily. PT TAKES ON SS, BUT SHE CANNOT TELL WHAT THE SCALE IS-STATES IT'S BASED ON WHAT SHE IS GOING TO EAT.     minocycline (MINOCIN, DYNACIN) 100 mg capsule Take by mouth daily as needed. pantoprazole (PROTONIX) 40 mg tablet Take 1 Tablet by mouth daily. olopatadine (PATANOL) 0.1 % ophthalmic solution Administer 2 Drops to both eyes two (2) times a day. peg 400-propylene glycol 0.4-0.3 % drpg Apply  to eye two (2) times a day. cycloSPORINE (RESTASIS) 0.05 % dpet Administer 1 Drop to both eyes as needed. fluticasone (FLONASE) 50 mcg/actuation nasal spray 2 Sprays by Both Nostrils route two (2) times a day. No current facility-administered medications for this visit.        Past Medical History:   Diagnosis Date    Arthritis     Asthma     Atrial fibrillation ([facility] [address]     Diabetes ([facility] [address]     GERD (gastroesophageal reflux disease)     Glaucoma     High cholesterol     Hypertension     Long term current use of anticoagulant therapy     Nausea & vomiting     Psychiatric disorder     DEPRESSION        Past Surgical History:   Procedure Laterality Date    HX  SECTION  6078,4121    HX GYN  1986    left ovarian cyst    HX GYN  1987    cyst on tube    HX GYN  1988    cyst removed    HX HEART CATHETERIZATION  2000    HX HEENT  2001    GLAUCOMA SURGERY    HX HEENT  2007    TMJ    HX KNEE REPLACEMENT Right 2015    Revision RTK    HX ORTHOPAEDIC  2008    RIGHT ACL    HX ORTHOPAEDIC  2010    RIGHT TKR    HX ORTHOPAEDIC  2012    CARPAL TUNNEL    HX ORTHOPAEDIC  2013    CARPAL TUNNEL    HX ORTHOPAEDIC  2014    RIGHT TKR    IR CHOLECYSTOSTOMY PERCUTANEOUS  2023    [place] UNLISTED PROCEDURE BREAST  1993    KEERTHI TUMOR REMOVED-BENIGN       Family History   Problem Relation Age of Onset    Cancer Father         PROSTATE    Heart Attack Brother     Anesth Problems Neg Hx         Social History     Socioeconomic History    Marital status: LEGALLY      Spouse name: Not on file    Number of children: Not on file    Years of education: Not on file    Highest education level: Not on file Occupational History    Not on file   Tobacco Use    Smoking status: Former     Packs/day: 0.25     Years: 10.00     Pack years: 2.50     Types: Cigarettes     Quit date: 12/15/2003     Years since quittin.3    Smokeless tobacco: Never   Substance and Sexual Activity    Alcohol use: No    Drug use: No    Sexual activity: Not on file   Other Topics Concern    Not on file   Social History Narrative    Not on file     Social Determinants of Health     Financial Resource Strain: Not on file   Food Insecurity: Not on file   Transportation Needs: Not on file   Physical Activity: Not on file   Stress: Not on file   Social Connections: Not on file   Intimate Partner Violence: Not on file   Housing Stability: Not on file       No orders of the defined types were placed in this encounter. An electronic signature was used to authenticate this note.   -- Jp Denney MD

## 2023-04-22 DIAGNOSIS — R10.13 EPIGASTRIC PAIN: Primary | ICD-10-CM

## 2023-06-24 ENCOUNTER — HOSPITAL ENCOUNTER (EMERGENCY)
Facility: HOSPITAL | Age: 63
Discharge: HOME | End: 2023-06-24
Attending: EMERGENCY MEDICINE
Payer: MEDICARE

## 2023-06-24 VITALS
BODY MASS INDEX: 32.61 KG/M2 | DIASTOLIC BLOOD PRESSURE: 64 MMHG | OXYGEN SATURATION: 96 % | SYSTOLIC BLOOD PRESSURE: 120 MMHG | HEART RATE: 80 BPM | RESPIRATION RATE: 16 BRPM | WEIGHT: 184.08 LBS | TEMPERATURE: 98.1 F

## 2023-06-24 DIAGNOSIS — N30.01 ACUTE CYSTITIS WITH HEMATURIA: Primary | ICD-10-CM

## 2023-06-24 LAB
APPEARANCE UR: CLEAR
BACTERIA URNS QL MICRO: ABNORMAL /HPF
BILIRUB UR QL: NEGATIVE
COLOR UR: YELLOW
EPITH CASTS URNS QL MICRO: ABNORMAL /LPF
GLUCOSE UR STRIP.AUTO-MCNC: >1000 MG/DL
HGB UR QL STRIP: ABNORMAL
KETONES UR QL STRIP.AUTO: NEGATIVE MG/DL
LEUKOCYTE ESTERASE UR QL STRIP.AUTO: ABNORMAL
NITRITE UR QL STRIP.AUTO: NEGATIVE
PH UR STRIP: 5.5 (ref 5–8)
PROT UR STRIP-MCNC: NEGATIVE MG/DL
RBC #/AREA URNS HPF: ABNORMAL /HPF (ref 0–5)
SP GR UR REFRACTOMETRY: 1.03 (ref 1–1.03)
URINE CULTURE IF INDICATED: ABNORMAL
UROBILINOGEN UR QL STRIP.AUTO: 0.2 EU/DL (ref 0.2–1)
WBC URNS QL MICRO: ABNORMAL /HPF (ref 0–4)

## 2023-06-24 PROCEDURE — 99283 EMERGENCY DEPT VISIT LOW MDM: CPT

## 2023-06-24 PROCEDURE — 87077 CULTURE AEROBIC IDENTIFY: CPT

## 2023-06-24 PROCEDURE — 87086 URINE CULTURE/COLONY COUNT: CPT

## 2023-06-24 PROCEDURE — 81001 URINALYSIS AUTO W/SCOPE: CPT

## 2023-06-24 PROCEDURE — 87186 SC STD MICRODIL/AGAR DIL: CPT

## 2023-06-24 RX ORDER — NITROFURANTOIN 25; 75 MG/1; MG/1
100 CAPSULE ORAL 2 TIMES DAILY
Qty: 10 CAPSULE | Refills: 0 | Status: SHIPPED | OUTPATIENT
Start: 2023-06-24 | End: 2023-06-29

## 2023-06-24 ASSESSMENT — LIFESTYLE VARIABLES
HOW MANY STANDARD DRINKS CONTAINING ALCOHOL DO YOU HAVE ON A TYPICAL DAY: PATIENT DOES NOT DRINK
HOW OFTEN DO YOU HAVE A DRINK CONTAINING ALCOHOL: NEVER

## 2023-06-24 NOTE — ED PROVIDER NOTES
[facility]  EMERGENCY DEPARTMENT ENCOUNTER      Pt Name: Jamal Cummings  MRN: [id number]  [place] 1960  Date of evaluation: 2023  Provider: Remy Atkins MD      HISTORY OF PRESENT ILLNESS      77-year-old female history of diabetes, GERD, hypertension presents to the emergency department with a chief complaint of dysuria which began this morning. No back pain. The history is provided by the patient and medical records. Nursing Notes were reviewed.     REVIEW OF SYSTEMS         Review of Systems        PAST MEDICAL HISTORY     Past Medical History:   Diagnosis Date    Arthritis     Asthma     Atrial fibrillation ([address]     Diabetes ([address]     GERD (gastroesophageal reflux disease)     Glaucoma     High cholesterol     Hypertension     Long term current use of anticoagulant therapy     Nausea & vomiting     Psychiatric disorder     DEPRESSION         SURGICAL HISTORY       Past Surgical History:   Procedure Laterality Date    BREAST SURGERY  1993    CRISTA TUMOR REMOVED-BENIGN    CARDIAC CATHETERIZATION  2000     SECTION  3843,4712    GYN  1987    cyst on tube    GYN  1986    left ovarian cyst    GYN  1988    cyst removed    HEENT  2001    GLAUCOMA SURGERY    HEENT  2007    TMJ    IR CHOLECYSTOSTOMY PERCUTANEOUS COMPLETE  2023    ORTHOPEDIC SURGERY  2014    RIGHT TKR    ORTHOPEDIC SURGERY  2013    CARPAL TUNNEL    ORTHOPEDIC SURGERY  2012    CARPAL TUNNEL    ORTHOPEDIC SURGERY  2008    RIGHT ACL    ORTHOPEDIC SURGERY  2010    RIGHT TKR    TOTAL KNEE ARTHROPLASTY Right 2015    Revision RTK         CURRENT MEDICATIONS       Previous Medications    AMOXICILLIN (AMOXIL) 500 MG CAPSULE    ceived the following from [facility]: Outside name: amoxicillin (AMOXIL) 500 mg capsule    APIXABAN (ELIQUIS) 5 MG TABS TABLET    Take 5 mg by mouth 2 times daily    ASPIRIN 81 MG EC TABLET    Take

## 2023-06-25 LAB
BACTERIA SPEC CULT: ABNORMAL
CC UR VC: ABNORMAL
SERVICE CMNT-IMP: ABNORMAL

## 2023-06-26 LAB
BACTERIA SPEC CULT: ABNORMAL
CC UR VC: ABNORMAL
SERVICE CMNT-IMP: ABNORMAL

## 2023-06-28 DIAGNOSIS — I48.0 PAROXYSMAL ATRIAL FIBRILLATION (HCC): ICD-10-CM

## 2023-06-28 DIAGNOSIS — I25.110 ATHEROSCLEROTIC HEART DISEASE OF NATIVE CORONARY ARTERY WITH UNSTABLE ANGINA PECTORIS (HCC): ICD-10-CM

## 2023-06-28 RX ORDER — ENALAPRIL MALEATE 10 MG/1
TABLET ORAL
Qty: 90 TABLET | Refills: 1 | Status: SHIPPED | OUTPATIENT
Start: 2023-06-28

## 2023-07-20 DIAGNOSIS — E78.5 HYPERLIPIDEMIA, UNSPECIFIED: ICD-10-CM

## 2023-07-20 RX ORDER — ATORVASTATIN CALCIUM 40 MG/1
TABLET, FILM COATED ORAL
Qty: 90 TABLET | Refills: 1 | Status: SHIPPED | OUTPATIENT
Start: 2023-07-20

## 2023-07-20 NOTE — TELEPHONE ENCOUNTER
Requested Prescriptions     Signed Prescriptions Disp Refills    atorvastatin (LIPITOR) 40 MG tablet 90 tablet 1     Sig: TAKE 1 TABLET BY MOUTH EVERY NIGHT     Authorizing Provider: Cecil Alvarez     Ordering User: Roland TEJEDA per MD    Needs lipid panel.     Future Appointments   Date Time Provider [address]   9/5/2023  1:20 PM MD ARCHANA [facility]

## 2023-08-08 ENCOUNTER — TELEPHONE (OUTPATIENT)
Age: 63
End: 2023-08-08

## 2023-08-08 NOTE — TELEPHONE ENCOUNTER
Left message for return call to patient. Dr Ana Maria Taylor does not have any appointments available prior to appointment. I spoke with a nurse and  The appt date should be enough time for the patient to have cardiac clearance before surgery.      Future Appointments   Date Time Provider [address]   9/5/2023  1:20 PM MD ARCHANA [facility]

## 2023-08-08 NOTE — TELEPHONE ENCOUNTER
Pt called have an appt on 09/5/23 needs earlier appt with Dr. Chelsey Maynard due to PT having left knee surgery  09/11/23 @ [facility]/Dr. Haydee Gipson. PT was informed to have appt 2wks prior to surgery.     PT# [phone]

## 2023-08-09 NOTE — TELEPHONE ENCOUNTER
Telephone call made to patient. Two patient identifiers verified. Spoke to patient and let her know that she should have enough time for clearance with her original appointment before her surgery. Patient is keeping original appointment.      Future Appointments   Date Time Provider [address] [place] [address]   9/5/2023  1:20 PM MD ARCHANA Ley AMB

## 2023-08-17 DIAGNOSIS — I25.110 ATHEROSCLEROTIC HEART DISEASE OF NATIVE CORONARY ARTERY WITH UNSTABLE ANGINA PECTORIS (HCC): ICD-10-CM

## 2023-08-17 DIAGNOSIS — I48.0 PAROXYSMAL ATRIAL FIBRILLATION (HCC): ICD-10-CM

## 2023-08-17 RX ORDER — APIXABAN 5 MG/1
TABLET, FILM COATED ORAL
Qty: 180 TABLET | Refills: 3 | Status: SHIPPED | OUTPATIENT
Start: 2023-08-17

## 2023-08-17 NOTE — TELEPHONE ENCOUNTER
Requested Prescriptions     Signed Prescriptions Disp Refills    ELIQUIS 5 MG TABS tablet 180 tablet 3     Sig: TAKE 1 TABLET BY MOUTH TWO TIMES A DAY. Authorizing Provider: Donaldo Braun     Ordering User:  Silvestre Yi per Dr. Nereida Buck   Date Time Provider [address]   9/5/2023  1:20 PM MD ARCHANA Doty AMB

## 2023-09-05 ENCOUNTER — OFFICE VISIT (OUTPATIENT)
Age: 63
End: 2023-09-05
Payer: MEDICARE

## 2023-09-05 VITALS
HEIGHT: 63 IN | SYSTOLIC BLOOD PRESSURE: 130 MMHG | BODY MASS INDEX: 31.18 KG/M2 | RESPIRATION RATE: 16 BRPM | WEIGHT: 176 LBS | DIASTOLIC BLOOD PRESSURE: 70 MMHG | HEART RATE: 85 BPM | OXYGEN SATURATION: 98 %

## 2023-09-05 DIAGNOSIS — Z79.01 CHRONIC ANTICOAGULATION: ICD-10-CM

## 2023-09-05 DIAGNOSIS — Z01.810 PREOP CARDIOVASCULAR EXAM: Primary | ICD-10-CM

## 2023-09-05 DIAGNOSIS — I48.0 PAF (PAROXYSMAL ATRIAL FIBRILLATION) (HCC): ICD-10-CM

## 2023-09-05 DIAGNOSIS — I25.10 CORONARY ARTERY DISEASE INVOLVING NATIVE CORONARY ARTERY OF NATIVE HEART WITHOUT ANGINA PECTORIS: ICD-10-CM

## 2023-09-05 PROCEDURE — 3075F SYST BP GE 130 - 139MM HG: CPT | Performed by: INTERNAL MEDICINE

## 2023-09-05 PROCEDURE — 99214 OFFICE O/P EST MOD 30 MIN: CPT | Performed by: INTERNAL MEDICINE

## 2023-09-05 PROCEDURE — 1036F TOBACCO NON-USER: CPT | Performed by: INTERNAL MEDICINE

## 2023-09-05 PROCEDURE — 3078F DIAST BP <80 MM HG: CPT | Performed by: INTERNAL MEDICINE

## 2023-09-05 PROCEDURE — 3017F COLORECTAL CA SCREEN DOC REV: CPT | Performed by: INTERNAL MEDICINE

## 2023-09-05 PROCEDURE — G8417 CALC BMI ABV UP PARAM F/U: HCPCS | Performed by: INTERNAL MEDICINE

## 2023-09-05 PROCEDURE — G8428 CUR MEDS NOT DOCUMENT: HCPCS | Performed by: INTERNAL MEDICINE

## 2023-09-05 RX ORDER — NETARSUDIL AND LATANOPROST OPHTHALMIC SOLUTION, 0.02%/0.005% .2; .05 MG/ML; MG/ML
SOLUTION/ DROPS OPHTHALMIC; TOPICAL
COMMUNITY
Start: 2023-06-05

## 2023-09-05 RX ORDER — OMEPRAZOLE 20 MG/1
CAPSULE, DELAYED RELEASE ORAL
COMMUNITY
Start: 2023-06-06

## 2023-09-05 RX ORDER — MINOCYCLINE HYDROCHLORIDE 100 MG/1
100 TABLET ORAL DAILY
COMMUNITY
Start: 2023-08-15

## 2023-09-05 ASSESSMENT — PATIENT HEALTH QUESTIONNAIRE - PHQ9
1. LITTLE INTEREST OR PLEASURE IN DOING THINGS: 0
SUM OF ALL RESPONSES TO PHQ QUESTIONS 1-9: 0
SUM OF ALL RESPONSES TO PHQ9 QUESTIONS 1 & 2: 0
2. FEELING DOWN, DEPRESSED OR HOPELESS: 0
SUM OF ALL RESPONSES TO PHQ QUESTIONS 1-9: 0

## 2023-09-05 NOTE — PROGRESS NOTES
brimonidine (ALPHAGAN P) 0.15 % ophthalmic solution ceived the following from [facility]: Outside name: brimonidine (ALPHAGAN) 0.15 % ophthalmic solution    clindamycin (CLINDAGEL) 1 % gel ceived the following from [facility]: Outside name: clindamycin (CLINDAGEL) 1 % topical gel    clotrimazole-betamethasone (LOTRISONE) 1-0.05 % cream ceived the following from [facility]: Outside name: clotrimazole-betamethasone (LOTRISONE) topical cream    cycloSPORINE (RESTASIS) 0.05 % ophthalmic emulsion Apply 1 drop to eye as needed    dapagliflozin (FARXIGA) 10 MG tablet Take by mouth daily    diclofenac sodium (VOLTAREN) 1 % GEL APPLY 4 GRAMS TP AFFECTED AREAS ON FEET 4 TIMES A DAY    dicyclomine (BENTYL) 20 MG tablet Take 1 tablet by mouth 3 times daily    dorzolamide-timolol (COSOPT) 22.3-6.8 MG/ML ophthalmic solution ceived the following from [facility]: Outside name: dorzolamide-timoloL (COSOPT) 22.3-6.8 mg/mL ophthalmic solution    fluconazole (DIFLUCAN) 100 MG tablet Take 1 tablet by mouth every 7 days    fluticasone furoate-vilanterol (BREO ELLIPTA) 200-25 MCG/ACT AEPB inhaler ceived the following from [facility]: Outside name: Breo Ellipta 200-25 mcg/dose inhaler    fluticasone (FLONASE) 50 MCG/ACT nasal spray 2 sprays by Nasal route 2 times daily    insulin aspart protamine-insulin aspart (NOVOLOG 70/30) (70-30) 100 UNIT/ML injection Inject into the skin 3 times daily    meloxicam (MOBIC) 15 MG tablet TAKE 1 TABLET BY MOUTH EVERY DAY WITH FOOD    methylPREDNISolone (MEDROL DOSEPACK) 4 MG tablet Per dose pack instructions    minocycline (MINOCIN;DYNACIN) 100 MG capsule Take by mouth daily as needed    montelukast (SINGULAIR) 10 MG tablet Take 1 tablet by mouth daily    nitroGLYCERIN (NITROSTAT) 0.4 MG SL tablet DISSOLVE 1 TABLET UNDER THE TONGUE EVERY 5 MINUTES AS NEEDED FOR CHEST PAIN FOR UP TO 3 DOSES.     olopatadine (PATANOL) 0.1 %

## 2023-09-13 ENCOUNTER — HOSPITAL ENCOUNTER (EMERGENCY)
Facility: HOSPITAL | Age: 63
Discharge: HOME | End: 2023-09-13
Attending: STUDENT IN AN ORGANIZED HEALTH CARE EDUCATION/TRAINING PROGRAM
Payer: MEDICARE

## 2023-09-13 VITALS
SYSTOLIC BLOOD PRESSURE: 141 MMHG | TEMPERATURE: 97.9 F | BODY MASS INDEX: 31.48 KG/M2 | RESPIRATION RATE: 16 BRPM | HEART RATE: 79 BPM | DIASTOLIC BLOOD PRESSURE: 73 MMHG | OXYGEN SATURATION: 98 % | HEIGHT: 63 IN | WEIGHT: 177.69 LBS

## 2023-09-13 DIAGNOSIS — N30.00 ACUTE CYSTITIS WITHOUT HEMATURIA: Primary | ICD-10-CM

## 2023-09-13 LAB
APPEARANCE UR: CLEAR
BACTERIA URNS QL MICRO: ABNORMAL /HPF
BILIRUB UR QL: NEGATIVE
COLOR UR: ABNORMAL
EPITH CASTS URNS QL MICRO: ABNORMAL /LPF
GLUCOSE UR STRIP.AUTO-MCNC: >1000 MG/DL
HGB UR QL STRIP: NEGATIVE
KETONES UR QL STRIP.AUTO: NEGATIVE MG/DL
LEUKOCYTE ESTERASE UR QL STRIP.AUTO: ABNORMAL
NITRITE UR QL STRIP.AUTO: NEGATIVE
PH UR STRIP: 5.5 (ref 5–8)
PROT UR STRIP-MCNC: NEGATIVE MG/DL
RBC #/AREA URNS HPF: ABNORMAL /HPF (ref 0–5)
SP GR UR REFRACTOMETRY: 1.01 (ref 1–1.03)
URINE CULTURE IF INDICATED: ABNORMAL
UROBILINOGEN UR QL STRIP.AUTO: 0.2 EU/DL (ref 0.2–1)
WBC URNS QL MICRO: ABNORMAL /HPF (ref 0–4)

## 2023-09-13 PROCEDURE — 81001 URINALYSIS AUTO W/SCOPE: CPT

## 2023-09-13 PROCEDURE — 87186 SC STD MICRODIL/AGAR DIL: CPT

## 2023-09-13 PROCEDURE — 6370000000 HC RX 637 (ALT 250 FOR IP): Performed by: STUDENT IN AN ORGANIZED HEALTH CARE EDUCATION/TRAINING PROGRAM

## 2023-09-13 PROCEDURE — 87077 CULTURE AEROBIC IDENTIFY: CPT

## 2023-09-13 PROCEDURE — 99283 EMERGENCY DEPT VISIT LOW MDM: CPT

## 2023-09-13 PROCEDURE — 87086 URINE CULTURE/COLONY COUNT: CPT

## 2023-09-13 RX ORDER — CEPHALEXIN 500 MG/1
500 CAPSULE ORAL 4 TIMES DAILY
Qty: 20 CAPSULE | Refills: 0 | Status: SHIPPED | OUTPATIENT
Start: 2023-09-13 | End: 2023-09-18

## 2023-09-13 RX ORDER — CEPHALEXIN 250 MG/1
500 CAPSULE ORAL
Status: COMPLETED | OUTPATIENT
Start: 2023-09-13 | End: 2023-09-13

## 2023-09-13 RX ADMIN — CEPHALEXIN 500 MG: 250 CAPSULE ORAL at 20:07

## 2023-09-13 ASSESSMENT — LIFESTYLE VARIABLES: HOW OFTEN DO YOU HAVE A DRINK CONTAINING ALCOHOL: NEVER

## 2023-09-13 ASSESSMENT — PAIN - FUNCTIONAL ASSESSMENT: PAIN_FUNCTIONAL_ASSESSMENT: NONE - DENIES PAIN

## 2023-09-13 NOTE — ED TRIAGE NOTES
Patient presents to the ED with chief complaint of urinary frequency and dysuria onset yesterday.      Hx DM

## 2023-09-14 ASSESSMENT — ENCOUNTER SYMPTOMS: SHORTNESS OF BREATH: 0

## 2023-09-14 NOTE — ED NOTES
Pt seen by provider in triage without nurse assigned. Medications provided. Discharge and prescription instructions provided. Pt verbalized understanding. Opportunity provided for questions. Pt discharged home.       Miriam Sapp RN  09/13/23 2010

## 2023-09-14 NOTE — ED PROVIDER NOTES
[facility]  EMERGENCY DEPARTMENT ENCOUNTER      Pt Name: Jean Claude Quintana  MRN: [id number]  [address] [facility] 1960  Date of evaluation: 2023  Provider: Maria D Nick MD    CHIEF COMPLAINT       Chief Complaint   Patient presents with    Urinary Frequency         HISTORY OF PRESENT ILLNESS   79-year-old female with history of A-fib, HTN, HLD, DM presents to the ED with chief complaint of dysuria starting yesterday. Patient reports associated urinary frequency. No fevers, chills, nausea, vomiting, abdominal pain, or flank pain. No treatment prior to arrival.  Denies any vaginal discharge or bleeding. The history is provided by the patient. Review of External Medical Records:     Nursing Notes were reviewed. REVIEW OF SYSTEMS       Review of Systems   Respiratory:  Negative for shortness of breath. Cardiovascular:  Negative for chest pain. Except as noted above the remainder of the review of systems was reviewed and negative.        PAST MEDICAL HISTORY     Past Medical History:   Diagnosis Date    Arthritis     Asthma     Atrial fibrillation ([address])     Diabetes ([address])     GERD (gastroesophageal reflux disease)     Glaucoma     High cholesterol     Hypertension     Long term current use of anticoagulant therapy     Nausea & vomiting     Psychiatric disorder     DEPRESSION         SURGICAL HISTORY       Past Surgical History:   Procedure Laterality Date    BREAST SURGERY  1993    CRISTA TUMOR REMOVED-BENIGN    CARDIAC CATHETERIZATION  2000     SECTION  0515,1973    GYN  1987    cyst on tube    GYN  1986    left ovarian cyst    GYN  1988    cyst removed    HEENT  2001    GLAUCOMA SURGERY    HEENT  2007    TMJ    IR CHOLECYSTOSTOMY PERCUTANEOUS COMPLETE  2023    ORTHOPEDIC SURGERY  2014    RIGHT TKR    ORTHOPEDIC SURGERY  2013    CARPAL TUNNEL    ORTHOPEDIC SURGERY  2012    CARPAL TUNNEL    ORTHOPEDIC SURGERY

## 2023-09-16 LAB
BACTERIA SPEC CULT: ABNORMAL
CC UR VC: ABNORMAL
SERVICE CMNT-IMP: ABNORMAL

## 2023-12-12 ENCOUNTER — TELEPHONE (OUTPATIENT)
Age: 63
End: 2023-12-12

## 2023-12-12 NOTE — TELEPHONE ENCOUNTER
Patient called. 2 PI verified. She said she has been feeling pressure and a knot type feeling in the center of her chest that radiates down to the left and to her back. It's been constant. It's not going way. She said it gets worse when she moves. Her BP was 108/87 Pulse 84. No SOB noted. Will forward to Dr. Mita Milton for his recommendation.

## 2023-12-12 NOTE — TELEPHONE ENCOUNTER
Telephone call made to patient. Two patient identifiers verified. Let her know it sounded neuromuscular and to follow up with her pcp. Verified understanding. Patient will call Dr. Andrew Farooq.

## 2023-12-28 DIAGNOSIS — I25.110 ATHEROSCLEROTIC HEART DISEASE OF NATIVE CORONARY ARTERY WITH UNSTABLE ANGINA PECTORIS (HCC): ICD-10-CM

## 2023-12-28 DIAGNOSIS — I48.0 PAROXYSMAL ATRIAL FIBRILLATION (HCC): ICD-10-CM

## 2023-12-28 RX ORDER — ENALAPRIL MALEATE 10 MG/1
TABLET ORAL
Qty: 90 TABLET | Refills: 1 | OUTPATIENT
Start: 2023-12-28

## 2024-01-09 ENCOUNTER — TELEPHONE (OUTPATIENT)
Age: 64
End: 2024-01-09

## 2024-01-09 NOTE — TELEPHONE ENCOUNTER
Faxed over Pre-Procedure Medication form and most recent office note from Dr. Dubon to [facility].     Fax # [phone]    Fax confirmation received.

## 2024-01-15 ENCOUNTER — TELEPHONE (OUTPATIENT)
Age: 64
End: 2024-01-15

## 2024-01-15 DIAGNOSIS — I25.110 ATHEROSCLEROTIC HEART DISEASE OF NATIVE CORONARY ARTERY WITH UNSTABLE ANGINA PECTORIS (HCC): ICD-10-CM

## 2024-01-15 DIAGNOSIS — I48.0 PAROXYSMAL ATRIAL FIBRILLATION (HCC): ICD-10-CM

## 2024-01-15 LAB
ANION GAP SERPL CALC-SCNC: 5 MMOL/L (ref 5–15)
BUN SERPL-MCNC: 16 MG/DL (ref 6–20)
BUN/CREAT SERPL: 25 (ref 12–20)
CALCIUM SERPL-MCNC: 8.8 MG/DL (ref 8.5–10.1)
CHLORIDE SERPL-SCNC: 107 MMOL/L (ref 97–108)
CO2 SERPL-SCNC: 28 MMOL/L (ref 21–32)
CREAT SERPL-MCNC: 0.65 MG/DL (ref 0.55–1.02)
ERYTHROCYTE [DISTWIDTH] IN BLOOD BY AUTOMATED COUNT: 13.4 % (ref 11.5–14.5)
GLUCOSE SERPL-MCNC: 192 MG/DL (ref 65–100)
HCT VFR BLD AUTO: 42.7 % (ref 35–47)
HGB BLD-MCNC: 13.9 G/DL (ref 11.5–16)
MCH RBC QN AUTO: 29.4 PG (ref 26–34)
MCHC RBC AUTO-ENTMCNC: 32.6 G/DL (ref 30–36.5)
MCV RBC AUTO: 90.5 FL (ref 80–99)
NRBC # BLD: 0 K/UL (ref 0–0.01)
NRBC BLD-RTO: 0 PER 100 WBC
PLATELET # BLD AUTO: 203 K/UL (ref 150–400)
PMV BLD AUTO: 11.6 FL (ref 8.9–12.9)
POTASSIUM SERPL-SCNC: 4.3 MMOL/L (ref 3.5–5.1)
RBC # BLD AUTO: 4.72 M/UL (ref 3.8–5.2)
SODIUM SERPL-SCNC: 140 MMOL/L (ref 136–145)
WBC # BLD AUTO: 6.6 K/UL (ref 3.6–11)

## 2024-01-15 RX ORDER — ENALAPRIL MALEATE 10 MG/1
10 TABLET ORAL DAILY
Qty: 90 TABLET | Refills: 1 | Status: SHIPPED | OUTPATIENT
Start: 2024-01-15

## 2024-01-15 NOTE — TELEPHONE ENCOUNTER
Patient came to office requesting refill. She states her pcp checks her A1C. She will get labs done before she leaves.     Requested Prescriptions     Signed Prescriptions Disp Refills    enalapril (VASOTEC) 10 MG tablet 90 tablet 1     Sig: Take 1 tablet by mouth daily     Authorizing Provider: AURELIANO DUBON     Ordering User: SOURAV WATTS MD    Future Appointments   Date Time Provider Department Center   3/5/2024  1:20 PM Aureliano Dubon MD [facility]

## 2024-01-16 ENCOUNTER — TELEPHONE (OUTPATIENT)
Age: 64
End: 2024-01-16

## 2024-01-16 NOTE — TELEPHONE ENCOUNTER
Telephone call made to patient. Two patient identifiers verified.   Went over results with patient. Verified understanding. All questions answered.

## 2024-01-16 NOTE — TELEPHONE ENCOUNTER
----- Message from Yoan Dubon MD sent at 1/16/2024 12:14 AM EST -----  Please let pt know labs were overall normal. Mildly elevated blood sugar; follow up with PCP. maría

## 2024-01-25 ENCOUNTER — TELEPHONE (OUTPATIENT)
Age: 64
End: 2024-01-25

## 2024-01-25 DIAGNOSIS — E78.5 HYPERLIPIDEMIA, UNSPECIFIED: ICD-10-CM

## 2024-01-25 RX ORDER — ATORVASTATIN CALCIUM 40 MG/1
40 TABLET, FILM COATED ORAL NIGHTLY
Qty: 90 TABLET | Refills: 1 | Status: SHIPPED | OUTPATIENT
Start: 2024-01-25

## 2024-01-25 NOTE — TELEPHONE ENCOUNTER
Requested records from pcp    Requested Prescriptions     Signed Prescriptions Disp Refills    atorvastatin (LIPITOR) 40 MG tablet 90 tablet 1     Sig: TAKE 1 TABLET BY MOUTH EVERY DAY AT NIGHT     Authorizing Provider: AURELIANO DUBON     Ordering User: SOURAV WATTS MD    Future Appointments   Date Time Provider Department Center   3/5/2024  1:20 PM Aureliano Dubon MD [facility]

## 2024-03-05 ENCOUNTER — OFFICE VISIT (OUTPATIENT)
Age: 64
End: 2024-03-05
Payer: MEDICARE

## 2024-03-05 VITALS
BODY MASS INDEX: 31.18 KG/M2 | OXYGEN SATURATION: 97 % | HEIGHT: 63 IN | DIASTOLIC BLOOD PRESSURE: 60 MMHG | SYSTOLIC BLOOD PRESSURE: 108 MMHG | WEIGHT: 176 LBS | HEART RATE: 94 BPM

## 2024-03-05 DIAGNOSIS — I48.0 PAROXYSMAL ATRIAL FIBRILLATION (HCC): ICD-10-CM

## 2024-03-05 DIAGNOSIS — E78.5 DYSLIPIDEMIA: ICD-10-CM

## 2024-03-05 DIAGNOSIS — Z01.810 PREOP CARDIOVASCULAR EXAM: ICD-10-CM

## 2024-03-05 DIAGNOSIS — I25.10 CORONARY ARTERY DISEASE INVOLVING NATIVE CORONARY ARTERY OF NATIVE HEART WITHOUT ANGINA PECTORIS: Primary | ICD-10-CM

## 2024-03-05 PROCEDURE — 1090F PRES/ABSN URINE INCON ASSESS: CPT | Performed by: INTERNAL MEDICINE

## 2024-03-05 PROCEDURE — 93010 ELECTROCARDIOGRAM REPORT: CPT | Performed by: INTERNAL MEDICINE

## 2024-03-05 PROCEDURE — 3074F SYST BP LT 130 MM HG: CPT | Performed by: INTERNAL MEDICINE

## 2024-03-05 PROCEDURE — 93005 ELECTROCARDIOGRAM TRACING: CPT | Performed by: INTERNAL MEDICINE

## 2024-03-05 PROCEDURE — 1036F TOBACCO NON-USER: CPT | Performed by: INTERNAL MEDICINE

## 2024-03-05 PROCEDURE — 1123F ACP DISCUSS/DSCN MKR DOCD: CPT | Performed by: INTERNAL MEDICINE

## 2024-03-05 PROCEDURE — 3078F DIAST BP <80 MM HG: CPT | Performed by: INTERNAL MEDICINE

## 2024-03-05 PROCEDURE — 99203 OFFICE O/P NEW LOW 30 MIN: CPT | Performed by: INTERNAL MEDICINE

## 2024-03-05 PROCEDURE — G8417 CALC BMI ABV UP PARAM F/U: HCPCS | Performed by: INTERNAL MEDICINE

## 2024-03-05 PROCEDURE — 3017F COLORECTAL CA SCREEN DOC REV: CPT | Performed by: INTERNAL MEDICINE

## 2024-03-05 PROCEDURE — G8427 DOCREV CUR MEDS BY ELIG CLIN: HCPCS | Performed by: INTERNAL MEDICINE

## 2024-03-05 PROCEDURE — G8484 FLU IMMUNIZE NO ADMIN: HCPCS | Performed by: INTERNAL MEDICINE

## 2024-03-05 PROCEDURE — G8400 PT W/DXA NO RESULTS DOC: HCPCS | Performed by: INTERNAL MEDICINE

## 2024-03-05 ASSESSMENT — PATIENT HEALTH QUESTIONNAIRE - PHQ9
SUM OF ALL RESPONSES TO PHQ QUESTIONS 1-9: 0
2. FEELING DOWN, DEPRESSED OR HOPELESS: 0
1. LITTLE INTEREST OR PLEASURE IN DOING THINGS: 0
SUM OF ALL RESPONSES TO PHQ QUESTIONS 1-9: 0
SUM OF ALL RESPONSES TO PHQ9 QUESTIONS 1 & 2: 0

## 2024-03-05 NOTE — PROGRESS NOTES
clindamycin (CLINDAGEL) 1 % gel ceived the following from [facility]: Outside name: clindamycin (CLINDAGEL) 1 % topical gel    clotrimazole-betamethasone (LOTRISONE) 1-0.05 % cream ceived the following from [facility]: Outside name: clotrimazole-betamethasone (LOTRISONE) topical cream    cycloSPORINE (RESTASIS) 0.05 % ophthalmic emulsion Apply 1 drop to eye as needed    dapagliflozin (FARXIGA) 10 MG tablet Take by mouth daily    diclofenac sodium (VOLTAREN) 1 % GEL APPLY 4 GRAMS TP AFFECTED AREAS ON FEET 4 TIMES A DAY    dicyclomine (BENTYL) 20 MG tablet Take 1 tablet by mouth 3 times daily    dorzolamide-timolol (COSOPT) 22.3-6.8 MG/ML ophthalmic solution ceived the following from [facility]: Outside name: dorzolamide-timoloL (COSOPT) 22.3-6.8 mg/mL ophthalmic solution    fluconazole (DIFLUCAN) 100 MG tablet Take 1 tablet by mouth every 7 days    fluticasone furoate-vilanterol (BREO ELLIPTA) 200-25 MCG/ACT AEPB inhaler ceived the following from [facility]: Outside name: Breo Ellipta 200-25 mcg/dose inhaler    fluticasone (FLONASE) 50 MCG/ACT nasal spray 2 sprays by Nasal route 2 times daily    insulin aspart protamine-insulin aspart (NOVOLOG 70/30) (70-30) 100 UNIT/ML injection Inject into the skin 3 times daily    meloxicam (MOBIC) 15 MG tablet 1 tablet as needed    minocycline (MINOCIN;DYNACIN) 100 MG capsule Take by mouth daily as needed    montelukast (SINGULAIR) 10 MG tablet Take 1 tablet by mouth daily    nitroGLYCERIN (NITROSTAT) 0.4 MG SL tablet DISSOLVE 1 TABLET UNDER THE TONGUE EVERY 5 MINUTES AS NEEDED FOR CHEST PAIN FOR UP TO 3 DOSES.    olopatadine (PATANOL) 0.1 % ophthalmic solution Apply 2 drops to eye 2 times daily    pantoprazole (PROTONIX) 40 MG tablet Take 1 tablet by mouth daily    polyethylene glycol-propylene glycol (SYSTANE) 0.4-0.3 % GEL ophthalmic gel Apply to eye 2 times daily    triamcinolone (KENALOG) 0.1 % cream

## 2024-03-06 ENCOUNTER — TELEPHONE (OUTPATIENT)
Age: 64
End: 2024-03-06

## 2024-03-13 ENCOUNTER — ANESTHESIA EVENT (OUTPATIENT)
Facility: HOSPITAL | Age: 64
End: 2024-03-13
Payer: MEDICARE

## 2024-03-13 ENCOUNTER — ANESTHESIA (OUTPATIENT)
Facility: HOSPITAL | Age: 64
End: 2024-03-13
Payer: MEDICARE

## 2024-03-13 ENCOUNTER — HOSPITAL ENCOUNTER (OUTPATIENT)
Facility: HOSPITAL | Age: 64
Setting detail: OUTPATIENT SURGERY
Discharge: HOME | End: 2024-03-13
Attending: INTERNAL MEDICINE | Admitting: INTERNAL MEDICINE
Payer: MEDICARE

## 2024-03-13 VITALS
BODY MASS INDEX: 31.29 KG/M2 | WEIGHT: 176.6 LBS | SYSTOLIC BLOOD PRESSURE: 122 MMHG | OXYGEN SATURATION: 95 % | RESPIRATION RATE: 15 BRPM | TEMPERATURE: 97.5 F | HEART RATE: 72 BPM | DIASTOLIC BLOOD PRESSURE: 59 MMHG | HEIGHT: 63 IN

## 2024-03-13 LAB
GLUCOSE BLD STRIP.AUTO-MCNC: 116 MG/DL (ref 65–117)
GLUCOSE BLD STRIP.AUTO-MCNC: 122 MG/DL (ref 65–117)
SERVICE CMNT-IMP: ABNORMAL
SERVICE CMNT-IMP: NORMAL

## 2024-03-13 PROCEDURE — 2580000003 HC RX 258: Performed by: INTERNAL MEDICINE

## 2024-03-13 PROCEDURE — 82962 GLUCOSE BLOOD TEST: CPT

## 2024-03-13 PROCEDURE — 7100000011 HC PHASE II RECOVERY - ADDTL 15 MIN: Performed by: INTERNAL MEDICINE

## 2024-03-13 PROCEDURE — 2500000003 HC RX 250 WO HCPCS: Performed by: ANESTHESIOLOGY

## 2024-03-13 PROCEDURE — 6360000002 HC RX W HCPCS: Performed by: ANESTHESIOLOGY

## 2024-03-13 PROCEDURE — 3600007502: Performed by: INTERNAL MEDICINE

## 2024-03-13 PROCEDURE — 2709999900 HC NON-CHARGEABLE SUPPLY: Performed by: INTERNAL MEDICINE

## 2024-03-13 PROCEDURE — 7100000010 HC PHASE II RECOVERY - FIRST 15 MIN: Performed by: INTERNAL MEDICINE

## 2024-03-13 PROCEDURE — 3700000000 HC ANESTHESIA ATTENDED CARE: Performed by: INTERNAL MEDICINE

## 2024-03-13 PROCEDURE — 88305 TISSUE EXAM BY PATHOLOGIST: CPT

## 2024-03-13 RX ORDER — SODIUM CHLORIDE 9 MG/ML
INJECTION, SOLUTION INTRAVENOUS CONTINUOUS
Status: DISCONTINUED | OUTPATIENT
Start: 2024-03-13 | End: 2024-03-13 | Stop reason: HOSPADM

## 2024-03-13 RX ORDER — LIDOCAINE HYDROCHLORIDE 20 MG/ML
INJECTION, SOLUTION EPIDURAL; INFILTRATION; INTRACAUDAL; PERINEURAL PRN
Status: DISCONTINUED | OUTPATIENT
Start: 2024-03-13 | End: 2024-03-13 | Stop reason: SDUPTHER

## 2024-03-13 RX ORDER — SODIUM CHLORIDE 0.9 % (FLUSH) 0.9 %
5-40 SYRINGE (ML) INJECTION EVERY 12 HOURS SCHEDULED
Status: DISCONTINUED | OUTPATIENT
Start: 2024-03-13 | End: 2024-03-13 | Stop reason: HOSPADM

## 2024-03-13 RX ORDER — SODIUM CHLORIDE 0.9 % (FLUSH) 0.9 %
5-40 SYRINGE (ML) INJECTION PRN
Status: DISCONTINUED | OUTPATIENT
Start: 2024-03-13 | End: 2024-03-13 | Stop reason: HOSPADM

## 2024-03-13 RX ORDER — SODIUM CHLORIDE 9 MG/ML
INJECTION, SOLUTION INTRAVENOUS CONTINUOUS
Status: DISCONTINUED | OUTPATIENT
Start: 2024-03-13 | End: 2024-03-13 | Stop reason: SDUPTHER

## 2024-03-13 RX ORDER — SODIUM CHLORIDE 0.9 % (FLUSH) 0.9 %
5-40 SYRINGE (ML) INJECTION EVERY 12 HOURS SCHEDULED
Status: DISCONTINUED | OUTPATIENT
Start: 2024-03-13 | End: 2024-03-13 | Stop reason: SDUPTHER

## 2024-03-13 RX ORDER — SODIUM CHLORIDE 9 MG/ML
25 INJECTION, SOLUTION INTRAVENOUS PRN
Status: DISCONTINUED | OUTPATIENT
Start: 2024-03-13 | End: 2024-03-13 | Stop reason: SDUPTHER

## 2024-03-13 RX ORDER — SODIUM CHLORIDE 9 MG/ML
25 INJECTION, SOLUTION INTRAVENOUS PRN
Status: DISCONTINUED | OUTPATIENT
Start: 2024-03-13 | End: 2024-03-13 | Stop reason: HOSPADM

## 2024-03-13 RX ORDER — SODIUM CHLORIDE 0.9 % (FLUSH) 0.9 %
5-40 SYRINGE (ML) INJECTION PRN
Status: DISCONTINUED | OUTPATIENT
Start: 2024-03-13 | End: 2024-03-13 | Stop reason: SDUPTHER

## 2024-03-13 RX ADMIN — PROPOFOL 50 MG: 10 INJECTION, EMULSION INTRAVENOUS at 09:44

## 2024-03-13 RX ADMIN — LIDOCAINE HYDROCHLORIDE 50 MG: 20 INJECTION, SOLUTION EPIDURAL; INFILTRATION; INTRACAUDAL; PERINEURAL at 09:38

## 2024-03-13 RX ADMIN — PROPOFOL 50 MG: 10 INJECTION, EMULSION INTRAVENOUS at 09:42

## 2024-03-13 RX ADMIN — PROPOFOL 50 MG: 10 INJECTION, EMULSION INTRAVENOUS at 09:39

## 2024-03-13 RX ADMIN — PROPOFOL 50 MG: 10 INJECTION, EMULSION INTRAVENOUS at 09:40

## 2024-03-13 RX ADMIN — PROPOFOL 50 MG: 10 INJECTION, EMULSION INTRAVENOUS at 09:38

## 2024-03-13 RX ADMIN — SODIUM CHLORIDE: 9 INJECTION, SOLUTION INTRAVENOUS at 09:23

## 2024-03-13 ASSESSMENT — PAIN - FUNCTIONAL ASSESSMENT: PAIN_FUNCTIONAL_ASSESSMENT: NONE - DENIES PAIN

## 2024-03-13 NOTE — PROGRESS NOTES
Endoscopy recovery  Patient returned to baseline, vital signs stable (see vital sign flowsheet). Patient offered liquids and tolerated well. Respiratory status within defined limits. Abdomen soft not tender. Skin with in defined limits. Responsible party driving patient home was given the opportunity to ask questions. Patient discharged with documented belongings.    Per Dr Cook pt to restart Eliquis 03/15/24. Pt aware

## 2024-03-13 NOTE — OP NOTE
[facility]  [address]  [place], [place] [address] [phone]                            NAME:  Teetee Mishra   :   1960   MRN:   [id number]     Date/Time:  3/13/2024 9:54 AM    Esophagogastroduodenoscopy (EGD) Procedure Note    :  Hussein De Leon MD    Staff: Circulator: Patience Ordaz RN  Endoscopy Technician: Celia Short     Implants: MAC    Referring Provider:  Milka Garcia MD    Anethesia/Sedation:  MAC anesthesia    Procedure Details     After infom consent was obtained for the procedure, with all risks and benefits of procedure explained the patient was taken to the endoscopy suite and placed in the left lateral decubitus position.  Following sequential administration of sedation as per above, the [id number] gastroscope was inserted into the mouth and advanced under direct vision to second portion of the duodenum.  A careful inspection was made as the gastroscope was withdrawn, including a retroflexed view of the proximal stomach; findings and interventions are described below.      Findings:  Esophagus: Normal mucosa esophagus.  Z-line was irregular noted at 34 cm.  Sliding hiatal hernia was noted with diaphragmatic pinch at 36 cm (Hill grade 3)  Stomach: Patchy erythema was noted in the gastric antrum and body.  No ulcers or erosions were noted.  No bleeding or stigmata were noted.  Duodenum/jejunum:normal      Therapies:  biopsy of esophagus at GE junction  biopsy of stomach body, antrum    Specimens:  ID Type Source Tests Collected by Time Destination   1 : gastric bx Tissue Gastric SURGICAL PATHOLOGY Hussein De Leon MD 3/13/2024 0943    2 :  Tissue GE Junction Biopsy SURGICAL PATHOLOGY Hussein De Leon MD 3/13/2024 0945               EBL: None    Complications:   None; patient tolerated the procedure well.           Impression:    See Postoperative diagnosis above    Recommendations:  -Continue acid suppression., -Await pathology., -Follow symptoms.,  -GERD diet: avoid fried and fatty foods. peppermint, chocolate, alcohol, coffee, citrus fruits and juices, tomoato products; avoid lying down for 2 to 3 hours after eating.    Discharge disposition:  Home in the company of  when able to ambulate    Hussein De Leon MD

## 2024-03-13 NOTE — DISCHARGE INSTRUCTIONS
first so they know you're coming. And wear a face mask, if you have one.  If you have a face mask, wear it whenever you're around other people. It can help stop the spread of the virus when you cough or sneeze.  Clean and disinfect your home every day. Use household  and disinfectant wipes or sprays. Take special care to clean things that you grab with your hands. These include doorknobs, remote controls, phones, and handles on your refrigerator and microwave. And don't forget countertops, tabletops, bathrooms, and computer keyboards.  When to call for help  Call 911 anytime you think you may need emergency care. For example, call if:  You have severe trouble breathing. (You can't talk at all.)  You have constant chest pain or pressure.  You are severely dizzy or lightheaded.  You are confused or can't think clearly.  Your face and lips have a blue color.  You pass out (lose consciousness) or are very hard to wake up.  Call your doctor now if you develop symptoms such as:  Shortness of breath.  Fever.  Cough.  If you need to get care, call ahead to the doctor's office for instructions before you go. Make sure you wear a face mask, if you have one, to prevent exposing other people to the virus.  Where can you get the latest information?  The following health organizations are tracking and studying this virus. Their websites contain the most up-to-date information. You'll also learn what to do if you think you may have been exposed to the virus.  U.S. Centers for Disease Control and Prevention (CDC): The CDC provides updated news about the disease and travel advice. The website also tells you how to prevent the spread of infection. www.cdc.gov  World Health Organization (WHO): WHO offers information about the virus outbreaks. WHO also has travel advice. www.who.int  Current as of: April 1, 2020               Content Version: 12.4  © 3651-8910 [employer].   Care instructions adapted under license  by your healthcare professional. If you have questions about a medical condition or this instruction, always ask your healthcare professional. [employer] disclaims any warranty or liability for your use of this information.

## 2024-03-13 NOTE — ANESTHESIA PRE PROCEDURE
Department of Anesthesiology  Preprocedure Note       Name:  Teetee Mishra   Age:  64 y.o.  :  1960                                          MRN:  [id number]         Date:  3/13/2024      Surgeon: Surgeon(s):  Hussein De Leon MD    Procedure: Procedure(s):  EGD DILATION    Medications prior to admission:   Prior to Admission medications    Medication Sig Start Date End Date Taking? Authorizing Provider   atorvastatin (LIPITOR) 40 MG tablet TAKE 1 TABLET BY MOUTH EVERY DAY AT NIGHT 24   Yoan Dubon MD   enalapril (VASOTEC) 10 MG tablet Take 1 tablet by mouth daily 1/15/24   Yoan Dubon MD   omeprazole (PRILOSEC) 20 MG delayed release capsule Take 1 capsule by mouth as needed (reflux)  Patient not taking: Reported on 3/13/2024    Samia Rudolph MD   minocycline (DYNACIN) 100 MG tablet Take 1 tablet by mouth daily 8/15/23   Samia Rudolph MD   ROCKLATAN 0.02-0.005 % ophthalmic solution INSTILL 1 DROP INTO RIGHT EYE ONCE A DAY 90 DAYS 23   Samia Rudolph MD   ELIQUIS 5 MG TABS tablet TAKE 1 TABLET BY MOUTH TWO TIMES A DAY. 23   Yoan Dubon MD   metoprolol succinate (TOPROL XL) 50 MG extended release tablet TAKE 1 TABLET BY MOUTH EVERY DAY 23   Yoan Dubon MD   amoxicillin (AMOXIL) 500 MG capsule ceived the following from [facility]: Outside name: amoxicillin (AMOXIL) 500 mg capsule for dental procedure 21   Automatic Reconciliation, Ar   aspirin 81 MG EC tablet Take 1 tablet by mouth daily 22   Automatic Reconciliation, Ar   azelastine (ASTELIN) 0.1 % nasal spray ceived the following from [facility]: Outside name: azelastine (ASTELIN) 137 mcg (0.1 %) nasal spray  Patient not taking: Reported on 3/13/2024    Automatic Reconciliation, Ar   brimonidine (ALPHAGAN P) 0.15 % ophthalmic solution ceived the following from [facility]: Outside name: brimonidine (ALPHAGAN) 0.15 % ophthalmic solution 10/28/21

## 2024-03-13 NOTE — H&P
[facility]  [address]  [place] [address] [phone]                                History and Physical     NAME: Teetee Mishra   :  1960   MRN:  [id number]     HPI:  The patient was seen and examined.    Past Surgical History:   Procedure Laterality Date    BREAST SURGERY  1993    CRISTA TUMOR REMOVED-BENIGN    CARDIAC CATHETERIZATION  2000     SECTION  ,    GYN  1987    cyst on tube    GYN  1986    left ovarian cyst    GYN  1988    cyst removed    HEENT  2001    GLAUCOMA SURGERY    HEENT  2007    TMJ    IR CHOLECYSTOSTOMY PERCUTANEOUS COMPLETE  2023    ORTHOPEDIC SURGERY  2014    RIGHT TKR    ORTHOPEDIC SURGERY  2013    CARPAL TUNNEL    ORTHOPEDIC SURGERY  2012    CARPAL TUNNEL    ORTHOPEDIC SURGERY  2008    RIGHT ACL    ORTHOPEDIC SURGERY  2010    RIGHT TKR    TOTAL KNEE ARTHROPLASTY Right 2015    Revision RTK     Past Medical History:   Diagnosis Date    Arthritis     Asthma     Atrial fibrillation (HCC)     Diabetes (HCC)     GERD (gastroesophageal reflux disease)     Glaucoma     High cholesterol     Hypertension     Long term current use of anticoagulant therapy     Nausea & vomiting     Psychiatric disorder     DEPRESSION     Social History     Tobacco Use    Smoking status: Former     Current packs/day: 0.00     Types: Cigarettes     Quit date: 12/15/2003     Years since quittin.2     Passive exposure: Past    Smokeless tobacco: Never   Vaping Use    Vaping Use: Never used   Substance Use Topics    Alcohol use: No    Drug use: No     Allergies   Allergen Reactions    Adhesive Tape Other (See Comments)     Other reaction(s): Unknown (comments)  SURGICAL GLUE    Sulfamethoxazole      Other reaction(s): Hives / Skin Rash/vomiting    Trimethoprim      Other reaction(s): Hives / Skin Rash/vomiting    Cyanoacrylate Rash    Other Rash     SURGICAL GLUE    Oxycodone

## 2024-03-13 NOTE — ANESTHESIA POSTPROCEDURE EVALUATION
Department of Anesthesiology  Postprocedure Note    Patient: Teetee Mishra  MRN: [id number]  YOB: 1960  Date of evaluation: 3/13/2024    Procedure Summary       Date: 03/13/24 Room / Location: [facility] ENDO 06 / [facility] ENDOSCOPY    Anesthesia Start: 0936 Anesthesia Stop: 0947    Procedure: EGD DILATION (Upper GI Region) Diagnosis:       Gastroesophageal hernia      Schatzki's ring of distal esophagus      Long term (current) use of anticoagulants      (Gastroesophageal hernia [K44.9])      (Schatzki's ring of distal esophagus [K22.2])      (Long term (current) use of anticoagulants [Z79.01])    Surgeons: Hussein De Leon MD Responsible Provider: Babak Hunt MD    Anesthesia Type: MAC ASA Status: 3            Anesthesia Type: MAC    Anthony Phase I: Anthony Score: 10    Anthony Phase II: Anthony Score: 9    Anesthesia Post Evaluation    Level of consciousness: awake  Respiratory status: acceptable    No notable events documented.

## 2024-03-13 NOTE — PROGRESS NOTES

## 2024-04-15 DIAGNOSIS — I25.110 ATHEROSCLEROTIC HEART DISEASE OF NATIVE CORONARY ARTERY WITH UNSTABLE ANGINA PECTORIS (HCC): ICD-10-CM

## 2024-04-15 DIAGNOSIS — I48.0 PAROXYSMAL ATRIAL FIBRILLATION (HCC): ICD-10-CM

## 2024-04-15 RX ORDER — METOPROLOL SUCCINATE 50 MG/1
TABLET, EXTENDED RELEASE ORAL
Qty: 90 TABLET | Refills: 3 | Status: SHIPPED | OUTPATIENT
Start: 2024-04-15

## 2024-04-15 NOTE — TELEPHONE ENCOUNTER
Requested Prescriptions     Signed Prescriptions Disp Refills    metoprolol succinate (TOPROL XL) 50 MG extended release tablet 90 tablet 3     Sig: TAKE 1 TABLET BY MOUTH EVERY DAY     Authorizing Provider: AURELIANO DUBON     Ordering User: SOURAV WATTS MD    Future Appointments   Date Time Provider Department Center   9/11/2024  2:20 PM Aureliano Dubon MD [facility]

## 2024-05-09 DIAGNOSIS — I48.0 PAROXYSMAL ATRIAL FIBRILLATION (HCC): ICD-10-CM

## 2024-05-09 DIAGNOSIS — I25.110 ATHEROSCLEROTIC HEART DISEASE OF NATIVE CORONARY ARTERY WITH UNSTABLE ANGINA PECTORIS (HCC): ICD-10-CM

## 2024-05-09 RX ORDER — ENALAPRIL MALEATE 10 MG/1
10 TABLET ORAL DAILY
Qty: 90 TABLET | Refills: 3 | Status: SHIPPED | OUTPATIENT
Start: 2024-05-09

## 2024-05-09 NOTE — TELEPHONE ENCOUNTER
Requested Prescriptions     Signed Prescriptions Disp Refills    enalapril (VASOTEC) 10 MG tablet 90 tablet 3     Sig: TAKE 1 TABLET BY MOUTH EVERY DAY     Authorizing Provider: AURELIANO DUBON     Ordering User: SOURAV WATTS MD    Future Appointments   Date Time Provider Department Center   9/11/2024  2:20 PM Aureliano Dubon MD [facility]

## 2024-07-01 PROBLEM — M54.16 LUMBAR RADICULOPATHY: Status: ACTIVE | Noted: 2023-07-06

## 2024-07-01 PROBLEM — M54.50 LUMBAR PAIN: Status: ACTIVE | Noted: 2023-07-06

## 2024-07-11 PROBLEM — H04.123 DRY EYE SYNDROME, BILATERAL: Status: ACTIVE | Noted: 2024-06-26

## 2024-08-06 DIAGNOSIS — I48.0 PAROXYSMAL ATRIAL FIBRILLATION (HCC): ICD-10-CM

## 2024-08-06 DIAGNOSIS — I25.110 ATHEROSCLEROTIC HEART DISEASE OF NATIVE CORONARY ARTERY WITH UNSTABLE ANGINA PECTORIS (HCC): ICD-10-CM

## 2024-08-07 RX ORDER — APIXABAN 5 MG/1
5 TABLET, FILM COATED ORAL 2 TIMES DAILY
Qty: 180 TABLET | Refills: 3 | Status: SHIPPED | OUTPATIENT
Start: 2024-08-07

## 2024-08-07 NOTE — TELEPHONE ENCOUNTER
Requested Prescriptions     Signed Prescriptions Disp Refills    ELIQUIS 5 MG TABS tablet 180 tablet 3     Sig: TAKE 1 TABLET BY MOUTH TWICE A DAY     Authorizing Provider: AURELIANO DUBON     Ordering User: SOURAV WATTS MD    Future Appointments   Date Time Provider Department Center   9/11/2024  2:20 PM Aureliano Dubon MD [facility]

## 2024-09-11 ENCOUNTER — OFFICE VISIT (OUTPATIENT)
Age: 64
End: 2024-09-11
Payer: MEDICARE

## 2024-09-11 VITALS
WEIGHT: 176.8 LBS | OXYGEN SATURATION: 100 % | HEART RATE: 71 BPM | BODY MASS INDEX: 31.33 KG/M2 | SYSTOLIC BLOOD PRESSURE: 116 MMHG | HEIGHT: 63 IN | DIASTOLIC BLOOD PRESSURE: 74 MMHG

## 2024-09-11 DIAGNOSIS — I48.0 PAF (PAROXYSMAL ATRIAL FIBRILLATION) (HCC): ICD-10-CM

## 2024-09-11 DIAGNOSIS — E78.2 MIXED HYPERLIPIDEMIA: ICD-10-CM

## 2024-09-11 DIAGNOSIS — I25.10 CORONARY ARTERY DISEASE INVOLVING NATIVE CORONARY ARTERY OF NATIVE HEART WITHOUT ANGINA PECTORIS: Primary | ICD-10-CM

## 2024-09-11 DIAGNOSIS — Z01.810 PREOP CARDIOVASCULAR EXAM: ICD-10-CM

## 2024-09-11 PROCEDURE — 99203 OFFICE O/P NEW LOW 30 MIN: CPT | Performed by: INTERNAL MEDICINE

## 2024-09-11 ASSESSMENT — PATIENT HEALTH QUESTIONNAIRE - PHQ9
1. LITTLE INTEREST OR PLEASURE IN DOING THINGS: NOT AT ALL
SUM OF ALL RESPONSES TO PHQ QUESTIONS 1-9: 0
2. FEELING DOWN, DEPRESSED OR HOPELESS: NOT AT ALL
SUM OF ALL RESPONSES TO PHQ9 QUESTIONS 1 & 2: 0
SUM OF ALL RESPONSES TO PHQ QUESTIONS 1-9: 0

## 2024-09-12 ENCOUNTER — TELEPHONE (OUTPATIENT)
Age: 64
End: 2024-09-12

## 2024-10-14 ENCOUNTER — TELEPHONE (OUTPATIENT)
Age: 64
End: 2024-10-14

## 2024-10-14 NOTE — TELEPHONE ENCOUNTER
[facility], fax provided to patient is [phone] please follow up    [facility] [phone]      [phone] darwin nava is point of contact, speak with darwin nava once fax sent for clearance and notify patient

## 2024-10-14 NOTE — TELEPHONE ENCOUNTER
3/23/2021 at 2:47 appointment with Dr. John Sharp on 4/8/2021 rescheduled with patient to in office appointment on 5/13/2021    Future Appointments   Date Time Provider Pablito Shah   5/13/2021  2:20 PM MD [facility] Right knee chronic PJI on suppressive oral abx with worsening pain, drainage

## 2024-10-16 NOTE — TELEPHONE ENCOUNTER
[facility] to let them know the clearance was faxed.     Telephone call made to patient. Two patient identifiers verified.   Spoke to patient to let her know it was fine to hold aspirin. Eliquis must be held for 48 hours. Verified understanding.

## 2024-11-01 ENCOUNTER — TELEPHONE (OUTPATIENT)
Age: 64
End: 2024-11-01

## 2024-11-01 NOTE — TELEPHONE ENCOUNTER
Received a request for [facility]. The patient was seen by Dr. Jaya Barron regarding her reflux. They would like to do an endoscopy. The note says she will not need to stop her Eliquis for the procedure. They would like cardiac clearance and her most recent office visit/ testing.     Fax to Miles Schaffer [phone]

## 2025-01-01 PROBLEM — M48.062 LUMBAR STENOSIS WITH NEUROGENIC CLAUDICATION: Status: ACTIVE | Noted: 2025-01-01

## 2025-01-01 PROBLEM — M54.9 BACK PAIN: Status: ACTIVE | Noted: 2025-01-01

## 2025-01-01 PROBLEM — M51.361 DEGENERATION OF INTERVERTEBRAL DISC OF LUMBAR REGION WITH LOWER EXTREMITY PAIN: Status: ACTIVE | Noted: 2025-01-01

## 2025-01-13 ENCOUNTER — TELEPHONE (OUTPATIENT)
Age: 65
End: 2025-01-13

## 2025-01-13 NOTE — TELEPHONE ENCOUNTER
Received a request for cardiac clearance from Dieudonne Nazario prior to TLIF with [facility]. Faxed clearance form and last OV. Received confirmation fax receipt.

## 2025-01-30 ENCOUNTER — TELEPHONE (OUTPATIENT)
Age: 65
End: 2025-01-30

## 2025-01-30 NOTE — TELEPHONE ENCOUNTER
Soumya is calling from [facility] because they would like to know if the patient can hold her aspirin starting on 1/31/25 for her upcoming surgery.Patient is schedule for 2/5/25 to have spinal fusion.        [phone] fax  [phone] office   Soumya

## 2025-04-05 DIAGNOSIS — I48.0 PAROXYSMAL ATRIAL FIBRILLATION (HCC): ICD-10-CM

## 2025-04-05 DIAGNOSIS — I25.110 ATHEROSCLEROTIC HEART DISEASE OF NATIVE CORONARY ARTERY WITH UNSTABLE ANGINA PECTORIS (HCC): ICD-10-CM

## 2025-04-07 RX ORDER — METOPROLOL SUCCINATE 50 MG/1
50 TABLET, EXTENDED RELEASE ORAL DAILY
Qty: 90 TABLET | Refills: 1 | Status: SHIPPED | OUTPATIENT
Start: 2025-04-07

## 2025-04-07 RX ORDER — ENALAPRIL MALEATE 10 MG/1
10 TABLET ORAL DAILY
Qty: 90 TABLET | Refills: 1 | Status: SHIPPED | OUTPATIENT
Start: 2025-04-07

## 2025-04-07 NOTE — TELEPHONE ENCOUNTER
Requested Prescriptions     Signed Prescriptions Disp Refills    enalapril (VASOTEC) 10 MG tablet 90 tablet 1     Sig: TAKE 1 TABLET BY MOUTH EVERY DAY     Authorizing Provider: AURELIANO DUBON     Ordering User: SOURAV WATTS    metoprolol succinate (TOPROL XL) 50 MG extended release tablet 90 tablet 1     Sig: TAKE 1 TABLET BY MOUTH EVERY DAY     Authorizing Provider: AURELIANO DUBON     Ordering User: SOURAV WATTS MD    Future Appointments   Date Time Provider Department Center   4/14/2025  9:20 AM Aureliano Dubon MD [facility]

## 2025-04-14 ENCOUNTER — OFFICE VISIT (OUTPATIENT)
Age: 65
End: 2025-04-14
Payer: MEDICARE

## 2025-04-14 VITALS
OXYGEN SATURATION: 98 % | HEART RATE: 102 BPM | WEIGHT: 172 LBS | BODY MASS INDEX: 30.48 KG/M2 | HEIGHT: 63 IN | DIASTOLIC BLOOD PRESSURE: 64 MMHG | SYSTOLIC BLOOD PRESSURE: 118 MMHG

## 2025-04-14 DIAGNOSIS — E11.9 CONTROLLED TYPE 2 DIABETES MELLITUS WITHOUT COMPLICATION, WITHOUT LONG-TERM CURRENT USE OF INSULIN: ICD-10-CM

## 2025-04-14 DIAGNOSIS — E78.2 MIXED HYPERLIPIDEMIA: ICD-10-CM

## 2025-04-14 DIAGNOSIS — I25.10 ATHEROSCLEROSIS OF NATIVE CORONARY ARTERY OF NATIVE HEART WITHOUT ANGINA PECTORIS: ICD-10-CM

## 2025-04-14 DIAGNOSIS — I48.0 PAROXYSMAL ATRIAL FIBRILLATION (HCC): Primary | ICD-10-CM

## 2025-04-14 PROCEDURE — G8427 DOCREV CUR MEDS BY ELIG CLIN: HCPCS | Performed by: INTERNAL MEDICINE

## 2025-04-14 PROCEDURE — 3074F SYST BP LT 130 MM HG: CPT | Performed by: INTERNAL MEDICINE

## 2025-04-14 PROCEDURE — 99214 OFFICE O/P EST MOD 30 MIN: CPT | Performed by: INTERNAL MEDICINE

## 2025-04-14 PROCEDURE — 3017F COLORECTAL CA SCREEN DOC REV: CPT | Performed by: INTERNAL MEDICINE

## 2025-04-14 PROCEDURE — 3078F DIAST BP <80 MM HG: CPT | Performed by: INTERNAL MEDICINE

## 2025-04-14 PROCEDURE — 1159F MED LIST DOCD IN RCRD: CPT | Performed by: INTERNAL MEDICINE

## 2025-04-14 PROCEDURE — 93010 ELECTROCARDIOGRAM REPORT: CPT | Performed by: INTERNAL MEDICINE

## 2025-04-14 PROCEDURE — G8400 PT W/DXA NO RESULTS DOC: HCPCS | Performed by: INTERNAL MEDICINE

## 2025-04-14 PROCEDURE — 1125F AMNT PAIN NOTED PAIN PRSNT: CPT | Performed by: INTERNAL MEDICINE

## 2025-04-14 PROCEDURE — 1036F TOBACCO NON-USER: CPT | Performed by: INTERNAL MEDICINE

## 2025-04-14 PROCEDURE — G2211 COMPLEX E/M VISIT ADD ON: HCPCS | Performed by: INTERNAL MEDICINE

## 2025-04-14 PROCEDURE — G8417 CALC BMI ABV UP PARAM F/U: HCPCS | Performed by: INTERNAL MEDICINE

## 2025-04-14 PROCEDURE — 2022F DILAT RTA XM EVC RTNOPTHY: CPT | Performed by: INTERNAL MEDICINE

## 2025-04-14 PROCEDURE — 1123F ACP DISCUSS/DSCN MKR DOCD: CPT | Performed by: INTERNAL MEDICINE

## 2025-04-14 PROCEDURE — 1090F PRES/ABSN URINE INCON ASSESS: CPT | Performed by: INTERNAL MEDICINE

## 2025-04-14 PROCEDURE — 93005 ELECTROCARDIOGRAM TRACING: CPT | Performed by: INTERNAL MEDICINE

## 2025-04-14 PROCEDURE — 3046F HEMOGLOBIN A1C LEVEL >9.0%: CPT | Performed by: INTERNAL MEDICINE

## 2025-04-14 ASSESSMENT — PATIENT HEALTH QUESTIONNAIRE - PHQ9
2. FEELING DOWN, DEPRESSED OR HOPELESS: NOT AT ALL
SUM OF ALL RESPONSES TO PHQ QUESTIONS 1-9: 0
SUM OF ALL RESPONSES TO PHQ QUESTIONS 1-9: 0
1. LITTLE INTEREST OR PLEASURE IN DOING THINGS: NOT AT ALL
SUM OF ALL RESPONSES TO PHQ QUESTIONS 1-9: 0
SUM OF ALL RESPONSES TO PHQ QUESTIONS 1-9: 0

## 2025-04-14 NOTE — PROGRESS NOTES
[place] [place]: Jagdish  [phone]    History of Present Illness:  Ms. Mishra is a 64 yo F seen in the past by Dr. Neri and Dali, coronary artery disease with cardiac catheterization with Dr. Norwood on 11/19/2021 demonstrating ostial/proximal LAD, mid LAD and significant mid circumflex lesions treated with two drug eluting stents to the LAD and one drug eluting stent to the circumflex. 3/2022 stress test was normal.     Since her last visit, she did have orthopedic surgery in February.  She has been doing physical therapy twice a week for this.  Overall breathing had been stable, but the last few days she has had increase in allergies.  Occasional palpitations.  No exertional chest pain. She is compensated on exam with clear lungs and no lower extremity edema. EKG was normal sinus rhythm.    Assessment and Plan:  1. Coronary artery disease, status post LAD stent 2021.  Stable and compensated and no changes made.  Will have her follow back in 6 months.  Most recent stress test was normal.  2. Paroxysmal AFib.  Stable in sinus rhythm.  3. Chronic anticoagulation.  No bleeding issues on Eliquis.  This can be held 48 hours prior to surgeries or procedures.  4. Essential hypertension.  Blood pressure is controlled.  5. Knee surgery in 2015 and 2024.  6. Type 2 diabetes.  7. Dyslipidemia.  Tolerating statin.  Goal LDL less than 70.  Will request her blood work that she had with her primary care from a few days ago.  8. Gastroesophageal reflux, Schatzki's ring.  Had dilation in the past with Dr. Shaver.       Echo 10/20 Ef 60% - mild MR  Nuc Stress test 6/18 - no ischemia Ef 69%  Echo 3/17 - LVEF 55-60%, no WMA, trivial MR  Loop Monitor 5/16 - no arrhythmias  Farshad Nuc Stress 1/16 - no ischemia, LVEF 60%  Echo 12/15 - LVEF 55-60%, no WMA  Cardiac Cath 11/2000 - no CAD, no plaque, apical HK, LVEF 67%     Fam Hx: brother with MI at age 43, mother had CABG (patient of Dr. Dubon)  Soc Hx: no tobacco use    She  has a

## 2025-04-16 DIAGNOSIS — E11.9 CONTROLLED TYPE 2 DIABETES MELLITUS WITHOUT COMPLICATION, WITHOUT LONG-TERM CURRENT USE OF INSULIN (HCC): ICD-10-CM

## 2025-04-16 DIAGNOSIS — E78.2 MIXED HYPERLIPIDEMIA: ICD-10-CM

## 2025-04-16 DIAGNOSIS — I48.0 PAROXYSMAL ATRIAL FIBRILLATION (HCC): ICD-10-CM

## 2025-04-16 DIAGNOSIS — I25.10 ATHEROSCLEROSIS OF NATIVE CORONARY ARTERY OF NATIVE HEART WITHOUT ANGINA PECTORIS: ICD-10-CM

## 2025-04-17 ENCOUNTER — RESULTS FOLLOW-UP (OUTPATIENT)
Age: 65
End: 2025-04-17

## 2025-04-17 LAB
CHOLEST SERPL-MCNC: 158 MG/DL
ERYTHROCYTE [DISTWIDTH] IN BLOOD BY AUTOMATED COUNT: 14.2 % (ref 11.5–14.5)
HCT VFR BLD AUTO: 43.2 % (ref 35–47)
HDLC SERPL-MCNC: 69 MG/DL
HDLC SERPL: 2.3 (ref 0–5)
HGB BLD-MCNC: 13.5 G/DL (ref 11.5–16)
LDLC SERPL CALC-MCNC: 65 MG/DL (ref 0–100)
MCH RBC QN AUTO: 27.8 PG (ref 26–34)
MCHC RBC AUTO-ENTMCNC: 31.3 G/DL (ref 30–36.5)
MCV RBC AUTO: 88.9 FL (ref 80–99)
NRBC # BLD: 0 K/UL (ref 0–0.01)
NRBC BLD-RTO: 0 PER 100 WBC
PLATELET # BLD AUTO: 214 K/UL (ref 150–400)
PMV BLD AUTO: 10.4 FL (ref 8.9–12.9)
RBC # BLD AUTO: 4.86 M/UL (ref 3.8–5.2)
TRIGL SERPL-MCNC: 120 MG/DL
TSH SERPL DL<=0.05 MIU/L-ACNC: 0.78 UIU/ML (ref 0.36–3.74)
VLDLC SERPL CALC-MCNC: 24 MG/DL
WBC # BLD AUTO: 6.6 K/UL (ref 3.6–11)

## 2025-04-18 NOTE — TELEPHONE ENCOUNTER
----- Message from Dr. Yoan Dubon MD sent at 4/17/2025 10:33 PM EDT -----  Please let pt know lipids were at goal. thx

## 2025-04-18 NOTE — TELEPHONE ENCOUNTER
Attempted to reach patient by telephone. A message was left for return call.     Mailed a results letter.

## 2025-06-10 ENCOUNTER — TELEPHONE (OUTPATIENT)
Age: 65
End: 2025-06-10

## 2025-06-10 DIAGNOSIS — I20.0 UNSTABLE ANGINA PECTORIS (HCC): Primary | ICD-10-CM

## 2025-06-10 NOTE — TELEPHONE ENCOUNTER
Yoan Dubon MD to Me      6/10/25  2:53 PM   Would set up nuc stress (treadmill if able, otherwise ilir) for unstable angina with h/o CAD. If her stress test is normal, can clear her for dental procedure/surgery. Thx    Attempted to reach patient by telephone. A message was left for return call.

## 2025-06-10 NOTE — TELEPHONE ENCOUNTER
Patient stopped in today with chest pain. It gets worse when she tries to take a deep breath. It feels sharp, and some pressure. It is not just when moving, but also when laying. Sometimes accompanied by diaphoresis. It radiates to under her left breast for 4 days. It reminds her of when she had a blockage in the past. She is also feeling like her food is getting caught in her esophagus, so she isn't sure if it is GI or cardiac. She brought in a form form for medical clearance prior to a dental implant surgery. They want her to hold aspirin and Eliquis. She will wait to get clearance and doesn't want to stop meds until she gets her heart checked out.

## 2025-06-11 NOTE — TELEPHONE ENCOUNTER
Telephone call made to patient. Two patient identifiers verified.   Scheduled stress test and went over instructions.     Patient aware if stress test is normal, she can get dental surgery. She requested lexiscan d/t not being able to walk on treadmill. She also requested instructions to be mailed to her. Mailed instructions.     Future Appointments   Date Time Provider Department Center   7/9/2025 12:30 PM AKUA DENNIS [facility] [address] [place]   10/14/2025  1:20 PM Yoan Dubon MD [facility]

## 2025-07-09 ENCOUNTER — RESULTS FOLLOW-UP (OUTPATIENT)
Age: 65
End: 2025-07-09

## 2025-07-09 ENCOUNTER — ANCILLARY PROCEDURE (OUTPATIENT)
Age: 65
End: 2025-07-09
Payer: MEDICARE

## 2025-07-09 VITALS
HEIGHT: 63 IN | SYSTOLIC BLOOD PRESSURE: 122 MMHG | DIASTOLIC BLOOD PRESSURE: 76 MMHG | BODY MASS INDEX: 30.48 KG/M2 | HEART RATE: 85 BPM | WEIGHT: 172 LBS

## 2025-07-09 DIAGNOSIS — I20.0 UNSTABLE ANGINA PECTORIS (HCC): ICD-10-CM

## 2025-07-09 LAB
ECHO BSA: 1.86 M2
NUC STRESS EJECTION FRACTION: 60 %
STRESS BASELINE DIAS BP: 76 MMHG
STRESS BASELINE HR: 85 BPM
STRESS BASELINE ST DEPRESSION: 0 MM
STRESS BASELINE SYS BP: 122 MMHG
STRESS O2 SAT PEAK: 99 %
STRESS O2 SAT REST: 96 %
STRESS PEAK DIAS BP: 72 MMHG
STRESS PEAK SYS BP: 114 MMHG
STRESS PERCENT HR ACHIEVED: 67 %
STRESS POST PEAK HR: 103 BPM
STRESS RATE PRESSURE PRODUCT: NORMAL BPM*MMHG
STRESS ST DEPRESSION: 0 MM
STRESS TARGET HR: 154 BPM
TID: 1.12

## 2025-07-09 PROCEDURE — A9500 TC99M SESTAMIBI: HCPCS | Performed by: INTERNAL MEDICINE

## 2025-07-09 PROCEDURE — 78452 HT MUSCLE IMAGE SPECT MULT: CPT | Performed by: INTERNAL MEDICINE

## 2025-07-09 RX ORDER — REGADENOSON 0.08 MG/ML
0.4 INJECTION, SOLUTION INTRAVENOUS
Status: COMPLETED | OUTPATIENT
Start: 2025-07-09 | End: 2025-07-09

## 2025-07-09 RX ORDER — TETRAKIS(2-METHOXYISOBUTYLISOCYANIDE)COPPER(I) TETRAFLUOROBORATE 1 MG/ML
1350 INJECTION, POWDER, LYOPHILIZED, FOR SOLUTION INTRAVENOUS
Status: CANCELLED | OUTPATIENT
Start: 2025-07-09

## 2025-07-09 RX ORDER — TETRAKIS(2-METHOXYISOBUTYLISOCYANIDE)COPPER(I) TETRAFLUOROBORATE 1 MG/ML
8.1 INJECTION, POWDER, LYOPHILIZED, FOR SOLUTION INTRAVENOUS
Status: COMPLETED | OUTPATIENT
Start: 2025-07-09 | End: 2025-07-09

## 2025-07-09 RX ORDER — TETRAKIS(2-METHOXYISOBUTYLISOCYANIDE)COPPER(I) TETRAFLUOROBORATE 1 MG/ML
25.1 INJECTION, POWDER, LYOPHILIZED, FOR SOLUTION INTRAVENOUS
Status: COMPLETED | OUTPATIENT
Start: 2025-07-09 | End: 2025-07-09

## 2025-07-09 RX ADMIN — REGADENOSON 0.4 MG: 0.08 INJECTION, SOLUTION INTRAVENOUS at 13:50

## 2025-07-09 RX ADMIN — TECHNETIUM TC-99M SESTAMIBI 8.1 MILLICURIE: 1 INJECTION INTRAVENOUS at 12:45

## 2025-07-09 RX ADMIN — TECHNETIUM TC-99M SESTAMIBI 25.1 MILLICURIE: 1 INJECTION INTRAVENOUS at 13:50

## 2025-08-27 ENCOUNTER — TELEPHONE (OUTPATIENT)
Age: 65
End: 2025-08-27

## (undated) DEVICE — BAG SPEC BIOHZRD 10 X 10 IN --

## (undated) DEVICE — SOLIDIFIER FLD 2OZ 1500CC N DISINF IN BTL DISP SAFESORB

## (undated) DEVICE — FORCEPS BX L160CM DIA8MM GRSP DISECT CUP TIP NONLOCKING ROT

## (undated) DEVICE — Device

## (undated) DEVICE — CUSTOM KT PTCA INFL DEV K05 00052M

## (undated) DEVICE — FORCEP BX 1.8 MMX100 CM NDL RADIAL JAW DISP

## (undated) DEVICE — BLOCK BITE ENDOSCP AD 21 MM W/ DIL BLU LF DISP

## (undated) DEVICE — 1200 GUARD II KIT W/5MM TUBE W/O VAC TUBE: Brand: GUARDIAN

## (undated) DEVICE — SYR ASSEMB INFL BLLN 60ML --

## (undated) DEVICE — BASIN EMSIS 16OZ GRAPHITE PLAS KID SHP MOLD GRAD FOR ORAL

## (undated) DEVICE — KIT MFLD ISOLATN NACL CNTRST PRT TBNG SPIK W/ PRSS TRNSDUC

## (undated) DEVICE — CONTAINER SPEC 20 ML LID NEUT BUFF FORMALIN 10 % POLYPR STS

## (undated) DEVICE — SET ADMIN 16ML TBNG L100IN 2 Y INJ SITE IV PIGGY BK DISP

## (undated) DEVICE — SYR 10ML LUER LOK 1/5ML GRAD --

## (undated) DEVICE — KIT HND CTRL 3 W STPCOCK ROT END 54IN PREM HI PRSS TBNG AT

## (undated) DEVICE — CATH GUID COR EB35 6FR 100CM -- LAUNCHER

## (undated) DEVICE — TOWEL 4 PLY TISS 19X30 SUE WHT

## (undated) DEVICE — ANGIOGRAPHY KIT

## (undated) DEVICE — CATH 5F 100CM JR40 -- DXTERITY

## (undated) DEVICE — CATH IV AUTOGRD BC PNK 20GA 25 -- INSYTE

## (undated) DEVICE — HEART CATH-MRMC: Brand: MEDLINE INDUSTRIES, INC.

## (undated) DEVICE — KIT MED IMAG CNTRST AGNT W/ IOPAMIDOL REUSE

## (undated) DEVICE — PACK PROCEDURE SURG HRT CATH

## (undated) DEVICE — Device: Brand: ASAHI SION BLACK

## (undated) DEVICE — 3M™ TEGADERM™ TRANSPARENT FILM DRESSING FRAME STYLE, 1626W, 4 IN X 4-3/4 IN (10 CM X 12 CM), 50/CT 4CT/CASE: Brand: 3M™ TEGADERM™

## (undated) DEVICE — COPILOT BLEEDBACK CONTROL VALVE: Brand: COPILOT

## (undated) DEVICE — CATH ANGI BLLN DIL 5.0X08MM -- NC EUPHORA

## (undated) DEVICE — NEONATAL-ADULT SPO2 SENSOR: Brand: NELLCOR

## (undated) DEVICE — Z DISCONTINUED PER MEDLINE LINE GAS SAMPLING O2/CO2 LNG AD 13 FT NSL W/ TBNG FILTERLINE

## (undated) DEVICE — RUNTHROUGH NS EXTRA FLOPPY PTCA GUIDEWIRE: Brand: RUNTHROUGH

## (undated) DEVICE — SYR 3ML LL TIP 1/10ML GRAD --

## (undated) DEVICE — SPECIAL PROCEDURE DRAPE 32" X 34": Brand: SPECIAL PROCEDURE DRAPE

## (undated) DEVICE — YANKAUER,TAPERED BULBOUS TIP,W/O VENT: Brand: MEDLINE

## (undated) DEVICE — CATH BLLN DIL 3.0 X12MM RX -- EUPHORA

## (undated) DEVICE — HYPODERMIC SAFETY NEEDLE: Brand: MAGELLAN

## (undated) DEVICE — GLIDESHEATH SLENDER STAINLESS STEEL KIT: Brand: GLIDESHEATH SLENDER

## (undated) DEVICE — CATH 5F 100CM JL35 -- DXTERITY

## (undated) DEVICE — ELECTRODE,RADIOTRANSLUCENT,FOAM,5PK: Brand: MEDLINE

## (undated) DEVICE — GUIDEWIRE VASC L260CM DIA0.035IN TIP L3MM STD EXCHG PTFE J

## (undated) DEVICE — BAG TRASH WST 122 CM 183 CM 1400 CC FEMALE LUER PVC DEPOT

## (undated) DEVICE — DEVICE COMPR REG 24 CM VASC BND